# Patient Record
Sex: FEMALE | Race: WHITE | Employment: OTHER | URBAN - NONMETROPOLITAN AREA
[De-identification: names, ages, dates, MRNs, and addresses within clinical notes are randomized per-mention and may not be internally consistent; named-entity substitution may affect disease eponyms.]

---

## 2017-01-04 ENCOUNTER — TELEPHONE (OUTPATIENT)
Dept: FAMILY MEDICINE CLINIC | Age: 82
End: 2017-01-04

## 2017-01-04 ENCOUNTER — OFFICE VISIT (OUTPATIENT)
Dept: FAMILY MEDICINE CLINIC | Age: 82
End: 2017-01-04

## 2017-01-04 VITALS
HEIGHT: 65 IN | SYSTOLIC BLOOD PRESSURE: 128 MMHG | HEART RATE: 75 BPM | RESPIRATION RATE: 14 BRPM | TEMPERATURE: 97.8 F | BODY MASS INDEX: 25.36 KG/M2 | WEIGHT: 152.2 LBS | DIASTOLIC BLOOD PRESSURE: 64 MMHG

## 2017-01-04 DIAGNOSIS — R05.9 COUGH: Primary | ICD-10-CM

## 2017-01-04 PROCEDURE — 99213 OFFICE O/P EST LOW 20 MIN: CPT | Performed by: FAMILY MEDICINE

## 2017-01-04 RX ORDER — LEVOFLOXACIN 500 MG/1
500 TABLET, FILM COATED ORAL DAILY
Qty: 7 TABLET | Refills: 0 | Status: SHIPPED | OUTPATIENT
Start: 2017-01-04 | End: 2017-01-04 | Stop reason: SDUPTHER

## 2017-01-04 RX ORDER — BENZONATATE 200 MG/1
200 CAPSULE ORAL 3 TIMES DAILY PRN
Qty: 30 CAPSULE | Refills: 0 | Status: SHIPPED | OUTPATIENT
Start: 2017-01-04 | End: 2017-01-14

## 2017-01-04 RX ORDER — LEVOFLOXACIN 500 MG/1
500 TABLET, FILM COATED ORAL DAILY
Qty: 7 TABLET | Refills: 0 | Status: SHIPPED | OUTPATIENT
Start: 2017-01-04 | End: 2017-01-11

## 2017-01-05 ENCOUNTER — TELEPHONE (OUTPATIENT)
Dept: FAMILY MEDICINE CLINIC | Age: 82
End: 2017-01-05

## 2017-01-26 ENCOUNTER — OFFICE VISIT (OUTPATIENT)
Dept: FAMILY MEDICINE CLINIC | Age: 82
End: 2017-01-26

## 2017-01-26 VITALS
TEMPERATURE: 97.7 F | DIASTOLIC BLOOD PRESSURE: 64 MMHG | HEART RATE: 68 BPM | RESPIRATION RATE: 14 BRPM | SYSTOLIC BLOOD PRESSURE: 124 MMHG | HEIGHT: 61 IN | BODY MASS INDEX: 28.51 KG/M2 | WEIGHT: 151 LBS

## 2017-01-26 DIAGNOSIS — M79.601 RIGHT ARM PAIN: Primary | ICD-10-CM

## 2017-01-26 PROCEDURE — 99214 OFFICE O/P EST MOD 30 MIN: CPT | Performed by: FAMILY MEDICINE

## 2017-01-26 PROCEDURE — 1123F ACP DISCUSS/DSCN MKR DOCD: CPT | Performed by: FAMILY MEDICINE

## 2017-01-26 PROCEDURE — 1036F TOBACCO NON-USER: CPT | Performed by: FAMILY MEDICINE

## 2017-01-26 PROCEDURE — G8427 DOCREV CUR MEDS BY ELIG CLIN: HCPCS | Performed by: FAMILY MEDICINE

## 2017-01-26 PROCEDURE — G8420 CALC BMI NORM PARAMETERS: HCPCS | Performed by: FAMILY MEDICINE

## 2017-01-26 PROCEDURE — 1090F PRES/ABSN URINE INCON ASSESS: CPT | Performed by: FAMILY MEDICINE

## 2017-01-26 PROCEDURE — 4040F PNEUMOC VAC/ADMIN/RCVD: CPT | Performed by: FAMILY MEDICINE

## 2017-01-26 PROCEDURE — G8484 FLU IMMUNIZE NO ADMIN: HCPCS | Performed by: FAMILY MEDICINE

## 2017-01-26 RX ORDER — HYDROCODONE BITARTRATE AND ACETAMINOPHEN 5; 325 MG/1; MG/1
1 TABLET ORAL EVERY 6 HOURS PRN
Qty: 30 TABLET | Refills: 0 | Status: SHIPPED | OUTPATIENT
Start: 2017-01-26 | End: 2017-02-02

## 2017-01-26 RX ORDER — PREDNISONE 20 MG/1
20 TABLET ORAL DAILY
Qty: 5 TABLET | Refills: 0 | Status: SHIPPED | OUTPATIENT
Start: 2017-01-26 | End: 2017-01-31

## 2017-01-26 RX ORDER — MELOXICAM 7.5 MG/1
7.5 TABLET ORAL DAILY
Qty: 30 TABLET | Refills: 0 | Status: SHIPPED | OUTPATIENT
Start: 2017-01-26 | End: 2017-05-24

## 2017-07-25 ENCOUNTER — OFFICE VISIT (OUTPATIENT)
Dept: FAMILY MEDICINE CLINIC | Age: 82
End: 2017-07-25
Payer: MEDICARE

## 2017-07-25 VITALS
WEIGHT: 157.4 LBS | DIASTOLIC BLOOD PRESSURE: 67 MMHG | RESPIRATION RATE: 12 BRPM | TEMPERATURE: 98.1 F | HEART RATE: 68 BPM | SYSTOLIC BLOOD PRESSURE: 119 MMHG | HEIGHT: 65 IN | BODY MASS INDEX: 26.23 KG/M2

## 2017-07-25 DIAGNOSIS — R42 DIZZINESS: Primary | ICD-10-CM

## 2017-07-25 DIAGNOSIS — R53.83 OTHER FATIGUE: ICD-10-CM

## 2017-07-25 PROCEDURE — G8419 CALC BMI OUT NRM PARAM NOF/U: HCPCS | Performed by: FAMILY MEDICINE

## 2017-07-25 PROCEDURE — 4040F PNEUMOC VAC/ADMIN/RCVD: CPT | Performed by: FAMILY MEDICINE

## 2017-07-25 PROCEDURE — 1123F ACP DISCUSS/DSCN MKR DOCD: CPT | Performed by: FAMILY MEDICINE

## 2017-07-25 PROCEDURE — G8427 DOCREV CUR MEDS BY ELIG CLIN: HCPCS | Performed by: FAMILY MEDICINE

## 2017-07-25 PROCEDURE — 1036F TOBACCO NON-USER: CPT | Performed by: FAMILY MEDICINE

## 2017-07-25 PROCEDURE — 99213 OFFICE O/P EST LOW 20 MIN: CPT | Performed by: FAMILY MEDICINE

## 2017-07-25 PROCEDURE — 93000 ELECTROCARDIOGRAM COMPLETE: CPT | Performed by: FAMILY MEDICINE

## 2017-07-25 PROCEDURE — 1090F PRES/ABSN URINE INCON ASSESS: CPT | Performed by: FAMILY MEDICINE

## 2017-07-25 RX ORDER — MULTIVIT WITH MINERALS/LUTEIN
1000 TABLET ORAL DAILY
COMMUNITY
End: 2019-03-28 | Stop reason: ALTCHOICE

## 2017-07-25 RX ORDER — LANOLIN ALCOHOL/MO/W.PET/CERES
3 CREAM (GRAM) TOPICAL DAILY
COMMUNITY
End: 2017-08-15 | Stop reason: ALTCHOICE

## 2017-07-26 ENCOUNTER — HOSPITAL ENCOUNTER (OUTPATIENT)
Age: 82
Setting detail: SPECIMEN
Discharge: HOME | End: 2017-07-26
Payer: MEDICARE

## 2017-07-26 ENCOUNTER — TELEPHONE (OUTPATIENT)
Dept: FAMILY MEDICINE CLINIC | Age: 82
End: 2017-07-26

## 2017-07-26 DIAGNOSIS — E03.8 SUBCLINICAL HYPOTHYROIDISM: Primary | ICD-10-CM

## 2017-07-26 LAB
ANION GAP SERPL CALCULATED.3IONS-SCNC: 13 MEQ/L (ref 8–16)
ANISOCYTOSIS: ABNORMAL
BASOPHILS # BLD: 0.9 %
BASOPHILS ABSOLUTE: 0 THOU/MM3 (ref 0–0.1)
BUN BLDV-MCNC: 15 MG/DL (ref 7–22)
CALCIUM SERPL-MCNC: 8.9 MG/DL (ref 8.5–10.5)
CHLORIDE BLD-SCNC: 100 MEQ/L (ref 98–111)
CO2: 24 MEQ/L (ref 23–33)
CREAT SERPL-MCNC: 0.5 MG/DL (ref 0.4–1.2)
EOSINOPHIL # BLD: 3.9 %
EOSINOPHILS ABSOLUTE: 0.2 THOU/MM3 (ref 0–0.4)
GFR SERPL CREATININE-BSD FRML MDRD: > 90 ML/MIN/1.73M2
GLUCOSE BLD-MCNC: 96 MG/DL (ref 70–108)
HCT VFR BLD CALC: 37.1 % (ref 37–47)
HEMOGLOBIN: 12.6 GM/DL (ref 12–16)
LYMPHOCYTES # BLD: 27.1 %
LYMPHOCYTES ABSOLUTE: 1.3 THOU/MM3 (ref 1–4.8)
MCH RBC QN AUTO: 32.3 PG (ref 27–31)
MCHC RBC AUTO-ENTMCNC: 34 GM/DL (ref 33–37)
MCV RBC AUTO: 95.1 FL (ref 81–99)
MONOCYTES # BLD: 12.5 %
MONOCYTES ABSOLUTE: 0.6 THOU/MM3 (ref 0.4–1.3)
NUCLEATED RED BLOOD CELLS: 0 /100 WBC
PDW BLD-RTO: 14.6 % (ref 11.5–14.5)
PLATELET # BLD: 239 THOU/MM3 (ref 130–400)
PMV BLD AUTO: 8.7 MCM (ref 7.4–10.4)
POTASSIUM SERPL-SCNC: 4.4 MEQ/L (ref 3.5–5.2)
RBC # BLD: 3.9 MILL/MM3 (ref 4.2–5.4)
RBC # BLD: NORMAL 10*6/UL
SEG NEUTROPHILS: 55.6 %
SEGMENTED NEUTROPHILS ABSOLUTE COUNT: 2.7 THOU/MM3 (ref 1.8–7.7)
SODIUM BLD-SCNC: 137 MEQ/L (ref 135–145)
T4 FREE: 0.95 NG/DL (ref 0.93–1.76)
TSH SERPL DL<=0.05 MIU/L-ACNC: 4.59 UIU/ML (ref 0.4–4.2)
WBC # BLD: 4.8 THOU/MM3 (ref 4.8–10.8)

## 2017-07-26 PROCEDURE — 80048 BASIC METABOLIC PNL TOTAL CA: CPT

## 2017-07-26 PROCEDURE — 84439 ASSAY OF FREE THYROXINE: CPT

## 2017-07-26 PROCEDURE — 85025 COMPLETE CBC W/AUTO DIFF WBC: CPT

## 2017-07-26 PROCEDURE — 36415 COLL VENOUS BLD VENIPUNCTURE: CPT

## 2017-07-26 PROCEDURE — 84443 ASSAY THYROID STIM HORMONE: CPT

## 2017-07-28 ENCOUNTER — HOSPITAL ENCOUNTER (EMERGENCY)
Age: 82
Discharge: HOME | End: 2017-07-28
Attending: EMERGENCY MEDICINE
Payer: MEDICARE

## 2017-07-28 VITALS
SYSTOLIC BLOOD PRESSURE: 128 MMHG | WEIGHT: 150 LBS | HEIGHT: 65 IN | HEART RATE: 59 BPM | TEMPERATURE: 98.4 F | BODY MASS INDEX: 24.99 KG/M2 | RESPIRATION RATE: 16 BRPM | OXYGEN SATURATION: 97 % | DIASTOLIC BLOOD PRESSURE: 64 MMHG

## 2017-07-28 DIAGNOSIS — R42 DIZZINESS: Primary | ICD-10-CM

## 2017-07-28 DIAGNOSIS — Z87.19 HISTORY OF RECTAL BLEEDING: ICD-10-CM

## 2017-07-28 LAB
ANION GAP SERPL CALCULATED.3IONS-SCNC: 9 MEQ/L (ref 8–16)
BASOPHILS # BLD: 1.3 %
BASOPHILS ABSOLUTE: 0.1 THOU/MM3 (ref 0–0.1)
BUN BLDV-MCNC: 12 MG/DL (ref 7–22)
CALCIUM SERPL-MCNC: 8.8 MG/DL (ref 8.5–10.5)
CHLORIDE BLD-SCNC: 102 MEQ/L (ref 98–111)
CO2: 25 MEQ/L (ref 23–33)
CREAT SERPL-MCNC: 0.6 MG/DL (ref 0.4–1.2)
EKG ATRIAL RATE: 56 BPM
EKG P AXIS: 47 DEGREES
EKG P-R INTERVAL: 184 MS
EKG Q-T INTERVAL: 438 MS
EKG QRS DURATION: 78 MS
EKG QTC CALCULATION (BAZETT): 422 MS
EKG R AXIS: 8 DEGREES
EKG T AXIS: 63 DEGREES
EKG VENTRICULAR RATE: 56 BPM
EOSINOPHIL # BLD: 5.5 %
EOSINOPHILS ABSOLUTE: 0.2 THOU/MM3 (ref 0–0.4)
GFR SERPL CREATININE-BSD FRML MDRD: > 90 ML/MIN/1.73M2
GLUCOSE BLD-MCNC: 98 MG/DL (ref 70–108)
HCT VFR BLD CALC: 36.2 % (ref 37–47)
HEMOCCULT STL QL: NEGATIVE
HEMOGLOBIN: 12.4 GM/DL (ref 12–16)
LYMPHOCYTES # BLD: 20.9 %
LYMPHOCYTES ABSOLUTE: 0.9 THOU/MM3 (ref 1–4.8)
MCH RBC QN AUTO: 32.6 PG (ref 27–31)
MCHC RBC AUTO-ENTMCNC: 34.3 GM/DL (ref 33–37)
MCV RBC AUTO: 95 FL (ref 81–99)
MONOCYTES # BLD: 14.6 %
MONOCYTES ABSOLUTE: 0.6 THOU/MM3 (ref 0.4–1.3)
NUCLEATED RED BLOOD CELLS: 0 /100 WBC
OSMOLALITY CALCULATION: 271.7 MOSMOL/KG (ref 275–300)
PDW BLD-RTO: 14.5 % (ref 11.5–14.5)
PLATELET # BLD: 218 THOU/MM3 (ref 130–400)
PMV BLD AUTO: 8.1 MCM (ref 7.4–10.4)
POTASSIUM SERPL-SCNC: 4.1 MEQ/L (ref 3.5–5.2)
PRO-BNP: 72.2 PG/ML (ref 0–1800)
RBC # BLD: 3.81 MILL/MM3 (ref 4.2–5.4)
RBC # BLD: NORMAL 10*6/UL
SEG NEUTROPHILS: 57.7 %
SEGMENTED NEUTROPHILS ABSOLUTE COUNT: 2.4 THOU/MM3 (ref 1.8–7.7)
SODIUM BLD-SCNC: 136 MEQ/L (ref 135–145)
TROPONIN T: < 0.01 NG/ML
WBC # BLD: 4.1 THOU/MM3 (ref 4.8–10.8)

## 2017-07-28 PROCEDURE — 84484 ASSAY OF TROPONIN QUANT: CPT

## 2017-07-28 PROCEDURE — 96360 HYDRATION IV INFUSION INIT: CPT

## 2017-07-28 PROCEDURE — 93005 ELECTROCARDIOGRAM TRACING: CPT

## 2017-07-28 PROCEDURE — 83880 ASSAY OF NATRIURETIC PEPTIDE: CPT

## 2017-07-28 PROCEDURE — 96361 HYDRATE IV INFUSION ADD-ON: CPT

## 2017-07-28 PROCEDURE — 2580000003 HC RX 258: Performed by: EMERGENCY MEDICINE

## 2017-07-28 PROCEDURE — 99283 EMERGENCY DEPT VISIT LOW MDM: CPT

## 2017-07-28 PROCEDURE — 80048 BASIC METABOLIC PNL TOTAL CA: CPT

## 2017-07-28 PROCEDURE — 82272 OCCULT BLD FECES 1-3 TESTS: CPT

## 2017-07-28 PROCEDURE — 36415 COLL VENOUS BLD VENIPUNCTURE: CPT

## 2017-07-28 PROCEDURE — 85025 COMPLETE CBC W/AUTO DIFF WBC: CPT

## 2017-07-28 RX ORDER — SODIUM CHLORIDE 9 MG/ML
INJECTION, SOLUTION INTRAVENOUS CONTINUOUS
Status: DISCONTINUED | OUTPATIENT
Start: 2017-07-28 | End: 2017-07-28 | Stop reason: HOSPADM

## 2017-07-28 RX ADMIN — SODIUM CHLORIDE: 9 INJECTION, SOLUTION INTRAVENOUS at 09:42

## 2017-07-28 ASSESSMENT — ENCOUNTER SYMPTOMS
NAUSEA: 0
COUGH: 0
BACK PAIN: 0
ABDOMINAL PAIN: 0
VOMITING: 0
BLOOD IN STOOL: 1
WHEEZING: 0
SORE THROAT: 0
DIARRHEA: 0
SHORTNESS OF BREATH: 0

## 2017-07-31 ENCOUNTER — CARE COORDINATION (OUTPATIENT)
Dept: FAMILY MEDICINE CLINIC | Age: 82
End: 2017-07-31

## 2017-08-15 ENCOUNTER — OFFICE VISIT (OUTPATIENT)
Dept: FAMILY MEDICINE CLINIC | Age: 82
End: 2017-08-15
Payer: MEDICARE

## 2017-08-15 VITALS
SYSTOLIC BLOOD PRESSURE: 108 MMHG | WEIGHT: 154.8 LBS | HEIGHT: 61 IN | BODY MASS INDEX: 29.23 KG/M2 | TEMPERATURE: 97.7 F | RESPIRATION RATE: 12 BRPM | HEART RATE: 74 BPM | DIASTOLIC BLOOD PRESSURE: 52 MMHG

## 2017-08-15 DIAGNOSIS — I50.32 CHRONIC DIASTOLIC CONGESTIVE HEART FAILURE (HCC): ICD-10-CM

## 2017-08-15 DIAGNOSIS — M79.602 LEFT ARM PAIN: Primary | ICD-10-CM

## 2017-08-15 PROCEDURE — 1123F ACP DISCUSS/DSCN MKR DOCD: CPT | Performed by: FAMILY MEDICINE

## 2017-08-15 PROCEDURE — 4040F PNEUMOC VAC/ADMIN/RCVD: CPT | Performed by: FAMILY MEDICINE

## 2017-08-15 PROCEDURE — G8427 DOCREV CUR MEDS BY ELIG CLIN: HCPCS | Performed by: FAMILY MEDICINE

## 2017-08-15 PROCEDURE — 1036F TOBACCO NON-USER: CPT | Performed by: FAMILY MEDICINE

## 2017-08-15 PROCEDURE — 1090F PRES/ABSN URINE INCON ASSESS: CPT | Performed by: FAMILY MEDICINE

## 2017-08-15 PROCEDURE — G8419 CALC BMI OUT NRM PARAM NOF/U: HCPCS | Performed by: FAMILY MEDICINE

## 2017-08-15 PROCEDURE — 99214 OFFICE O/P EST MOD 30 MIN: CPT | Performed by: FAMILY MEDICINE

## 2017-08-15 RX ORDER — CAPSAICIN 0.025 %
CREAM (GRAM) TOPICAL
Qty: 45 G | Refills: 1 | Status: SHIPPED | OUTPATIENT
Start: 2017-08-15 | End: 2017-09-14

## 2017-08-15 RX ORDER — ASPIRIN 325 MG
325 TABLET ORAL EVERY 4 HOURS PRN
COMMUNITY
End: 2019-03-28 | Stop reason: ALTCHOICE

## 2017-08-17 ENCOUNTER — CARE COORDINATION (OUTPATIENT)
Dept: CARE COORDINATION | Age: 82
End: 2017-08-17

## 2017-09-08 ENCOUNTER — CARE COORDINATION (OUTPATIENT)
Dept: CARE COORDINATION | Age: 82
End: 2017-09-08

## 2017-10-03 ENCOUNTER — OFFICE VISIT (OUTPATIENT)
Dept: FAMILY MEDICINE CLINIC | Age: 82
End: 2017-10-03
Payer: MEDICARE

## 2017-10-03 VITALS
RESPIRATION RATE: 12 BRPM | WEIGHT: 155.8 LBS | TEMPERATURE: 98.1 F | HEART RATE: 60 BPM | SYSTOLIC BLOOD PRESSURE: 119 MMHG | BODY MASS INDEX: 29.42 KG/M2 | HEIGHT: 61 IN | DIASTOLIC BLOOD PRESSURE: 60 MMHG

## 2017-10-03 DIAGNOSIS — E03.8 SUBCLINICAL HYPOTHYROIDISM: Primary | ICD-10-CM

## 2017-10-03 PROCEDURE — G8484 FLU IMMUNIZE NO ADMIN: HCPCS | Performed by: FAMILY MEDICINE

## 2017-10-03 PROCEDURE — 4040F PNEUMOC VAC/ADMIN/RCVD: CPT | Performed by: FAMILY MEDICINE

## 2017-10-03 PROCEDURE — 1123F ACP DISCUSS/DSCN MKR DOCD: CPT | Performed by: FAMILY MEDICINE

## 2017-10-03 PROCEDURE — 1090F PRES/ABSN URINE INCON ASSESS: CPT | Performed by: FAMILY MEDICINE

## 2017-10-03 PROCEDURE — 1036F TOBACCO NON-USER: CPT | Performed by: FAMILY MEDICINE

## 2017-10-03 PROCEDURE — 99213 OFFICE O/P EST LOW 20 MIN: CPT | Performed by: FAMILY MEDICINE

## 2017-10-03 PROCEDURE — G8417 CALC BMI ABV UP PARAM F/U: HCPCS | Performed by: FAMILY MEDICINE

## 2017-10-03 PROCEDURE — G8427 DOCREV CUR MEDS BY ELIG CLIN: HCPCS | Performed by: FAMILY MEDICINE

## 2017-10-03 NOTE — PROGRESS NOTES
Radha Hartman is a 80 y.o. female that presents for Thyroid Problem (pt is wanting more information about hypothyroidism) and Results (pt states she was at [facility], had testing done and doesn't remember getting any results.)        HPI:    Abnormal Thyroid Labs    HPI:  Patient with recent Thyroid testing with mildly elevated TSH and borderline low Free T4. Results below. Feeling well. Currently treated for Hypothyroidism? No  Fatigue? No  Recent change in weight? No  Cold/Heat intolerance? No  Diarrhea/Constipation? No  Diaphoresis? No  Anxiety? No  Palpitations? No   Hair Loss?   No    Component      Latest Ref Rng & Units 7/26/2017 7/26/2017          11:00 AM 11:00 AM   TSH      0.400 - 4.20 uIU/mL  4.590 (H)   T4 Free      0.93 - 1.76 ng/dL 0.95          Health Maintenance   Topic Date Due    DTaP/Tdap/Td vaccine (1 - Tdap) 05/25/1949    Flu vaccine (1) 09/01/2017    Zostavax vaccine  Addressed    Pneumococcal low/med risk  Completed       Past Medical History:   Diagnosis Date    Arthritis     Cancer ([facility] [address]     skin    Colitis     Colon polyps     Fatty liver     resolved per patient    Hearing loss of both ears     Hyperlipidemia     Varicosity        Past Surgical History:   Procedure Laterality Date    BLADDER SUSPENSION  1968    FOOT SURGERY Right 1970s    HYSTERECTOMY  1968    JOINT REPLACEMENT Left     left knee    JOINT REPLACEMENT Right     hip    MOHS SURGERY  1/22/14    NOSE    SMALL INTESTINE SURGERY  2008    small blockage-adhesion       Social History   Substance Use Topics    Smoking status: Never Smoker    Smokeless tobacco: Never Used    Alcohol use Yes      Comment: rare       Family History   Problem Relation Age of Onset    Arthritis Mother     Arthritis Father     Cancer Father      lung    Stroke Father     Diabetes Sister          I have reviewed the patient's past medical history, past surgical history, allergies, medications, social and family history and I have made updates where appropriate. ROS        PHYSICAL EXAM:  /60  Pulse 60  Temp 98.1 °F (36.7 °C) (Oral)   Resp 12  Ht 5' 1.02\" (1.55 m)  Wt 155 lb 12.8 oz (70.7 kg)  BMI 29.42 kg/m2    General Appearance: well developed and well- nourished, in no acute distress  Head: normocephalic and atraumatic  ENT: external ear and ear canal normal bilaterally, nose without deformity, nasal mucosa and turbinates normal without polyps, oropharynx normal, dentition is normal for age, no lip or gum lesions noted  Neck: supple and non-tender without mass, no thyromegaly or thyroid nodules, no cervical lymphadenopathy  Pulmonary/Chest: clear to auscultation bilaterally- no wheezes, rales or rhonchi, normal air movement, no respiratory distress or retractions  Cardiovascular: normal rate, regular rhythm, normal S1 and S2, no murmurs, rubs, clicks, or gallops, distal pulses intact  Extremities: no cyanosis, clubbing or edema of the lower extremities  Psych:  Normal affect without evidence of depression or anxiety, insight and judgement are appropriate, memory appears intact  Skin: warm and dry, no rash or erythema      ASSESSMENT & PLAN  Alta Cruz was seen today for thyroid problem and results. Diagnoses and all orders for this visit:    Subclinical hypothyroidism  -     TSH without Reflex; Future  -     T4, Free; Future    -Patient currently asymptomatic  -Will continue to monitor for now and recheck thyroid labs prior to next visit    Return if symptoms worsen or fail to improve. Alta Cruz received counseling on the following healthy behaviors: medication adherence  Reviewed prior labs and health maintenance. Continue current medications, diet and exercise. Discussed use, benefit, and side effects of prescribed medications. Barriers to medication compliance addressed. Patient given educational materials - see patient instructions. All patient questions answered. Patient voiced understanding.

## 2017-10-09 ENCOUNTER — HOSPITAL ENCOUNTER (EMERGENCY)
Age: 82
Discharge: HOME | End: 2017-10-09
Payer: MEDICARE

## 2017-10-09 VITALS
HEART RATE: 82 BPM | TEMPERATURE: 98.9 F | SYSTOLIC BLOOD PRESSURE: 120 MMHG | OXYGEN SATURATION: 97 % | DIASTOLIC BLOOD PRESSURE: 62 MMHG | RESPIRATION RATE: 16 BRPM

## 2017-10-09 DIAGNOSIS — L03.116 CELLULITIS OF LEFT THIGH: Primary | ICD-10-CM

## 2017-10-09 PROCEDURE — 99213 OFFICE O/P EST LOW 20 MIN: CPT

## 2017-10-09 PROCEDURE — 99213 OFFICE O/P EST LOW 20 MIN: CPT | Performed by: NURSE PRACTITIONER

## 2017-10-09 RX ORDER — CEPHALEXIN 500 MG/1
500 CAPSULE ORAL 4 TIMES DAILY
Qty: 40 CAPSULE | Refills: 0 | Status: SHIPPED | OUTPATIENT
Start: 2017-10-09 | End: 2017-10-19

## 2017-10-09 ASSESSMENT — PAIN DESCRIPTION - LOCATION: LOCATION: LEG

## 2017-10-09 ASSESSMENT — PAIN SCALES - GENERAL: PAINLEVEL_OUTOF10: 9

## 2017-10-09 ASSESSMENT — PAIN DESCRIPTION - DESCRIPTORS: DESCRIPTORS: SORE

## 2017-10-09 ASSESSMENT — PAIN DESCRIPTION - FREQUENCY: FREQUENCY: CONTINUOUS

## 2017-10-09 ASSESSMENT — ENCOUNTER SYMPTOMS
COLOR CHANGE: 1
SHORTNESS OF BREATH: 0

## 2017-10-09 ASSESSMENT — PAIN DESCRIPTION - PAIN TYPE: TYPE: ACUTE PAIN

## 2017-10-09 ASSESSMENT — PAIN DESCRIPTION - ORIENTATION: ORIENTATION: LEFT

## 2017-10-09 NOTE — ED PROVIDER NOTES
[facility]  Urgent Care Encounter      CHIEF COMPLAINT       Chief Complaint   Patient presents with    Leg Pain     left knee pain onset Saturday        Nurses Notes reviewed and I agree except as noted in the HPI. HISTORY OF PRESENT ILLNESS   Rehana Noguera is a 80 y.o. female who presents with c/o left thigh pain. Onset 3 days ago, unchanged. Pain is aching, continuous. Rates 9/10. Denies injury. Associated swelling and redness. Denies fever, chest pain, or SOB. No recent travel. No recent surgery. Minimal improvement with ice. REVIEW OF SYSTEMS     Review of Systems   Constitutional: Negative for chills, diaphoresis, fatigue and fever. Respiratory: Negative for shortness of breath. Cardiovascular: Positive for leg swelling (bilateral, chronic without changes). Negative for chest pain and palpitations. Musculoskeletal: Negative for arthralgias and joint swelling. Skin: Positive for color change. Negative for pallor. Skin mass, left thigh     Neurological: Negative for dizziness and headaches. Hematological: Negative for adenopathy. Psychiatric/Behavioral: Negative for confusion. PAST MEDICAL HISTORY         Diagnosis Date    Arthritis     Cancer ([facility] [address]     skin    Colitis     Colon polyps     Fatty liver     resolved per patient    Hearing loss of both ears     Hyperlipidemia     Varicosity        SURGICAL HISTORY     Patient  has a past surgical history that includes Foot surgery (Right, 1970s); Hysterectomy (1968); bladder suspension (1968); Small intestine surgery (2008); Mohs surgery (1/22/14); joint replacement (Left); and joint replacement (Right).     CURRENT MEDICATIONS       Previous Medications    ASPIRIN 325 MG TABLET    Take 325 mg by mouth every 4 hours as needed for Pain    IBUPROFEN (ADVIL;MOTRIN) 200 MG TABLET    Take 400 mg by mouth every 8 hours as needed for Pain     NONFORMULARY    [employer] Prim Advantage    OMEGA-3 FATTY ACIDS (FISH OIL) 1000 MG CAPS    Take 1,000 mg by mouth daily     PANAX GINSENG 100 MG CAPS    Take by mouth    PROBIOTIC PRODUCT (PROBIOTIC ADVANCED PO)    Take by mouth    UNABLE TO FIND    Alleviate, joint health    VITAMIN E 1000 UNITS CAPSULE    Take 1,000 Units by mouth daily       ALLERGIES     Patient is is allergic to sulfa antibiotics. FAMILY HISTORY     Patient's family history includes Arthritis in her father and mother; Cancer in her father; Diabetes in her sister; Stroke in her father. SOCIAL HISTORY     Patient  reports that she has never smoked. She has never used smokeless tobacco. She reports that she drinks alcohol. She reports that she does not use drugs. PHYSICAL EXAM     ED TRIAGE VITALS  BP: 120/62, Temp: 98.9 °F (37.2 °C), Pulse: 82, Resp: 16, SpO2: 97 %  Physical Exam   Constitutional: Vital signs are normal. She appears well-developed and well-nourished. Non-toxic appearance. She does not have a sickly appearance. She does not appear ill. No distress. HENT:   Head: Normocephalic and atraumatic. Cardiovascular:   Pulses:       Dorsalis pedis pulses are 2+ on the right side, and 2+ on the left side. Posterior tibial pulses are 2+ on the right side, and 2+ on the left side. Abdominal: There is no CVA tenderness. Musculoskeletal:        Right upper leg: Normal.        Left upper leg: She exhibits tenderness and swelling. She exhibits no bony tenderness, no edema and no deformity. Right lower leg: She exhibits no tenderness. Left lower leg: She exhibits no tenderness. Negative homans bilateral   Skin: Skin is warm, dry and intact. No abrasion, no bruising, no ecchymosis, no petechiae and no rash noted. Rash is not pustular and not vesicular. She is not diaphoretic. There is erythema. No pallor. Bilateral varicose veins   Nursing note and vitals reviewed. DIAGNOSTIC RESULTS   Labs:No results found for this visit on 10/09/17.     IMAGING:  No orders to

## 2017-10-10 ENCOUNTER — CARE COORDINATION (OUTPATIENT)
Dept: FAMILY MEDICINE CLINIC | Age: 82
End: 2017-10-10

## 2017-10-11 ENCOUNTER — CARE COORDINATION (OUTPATIENT)
Dept: CARE COORDINATION | Age: 82
End: 2017-10-11

## 2017-11-16 ENCOUNTER — CARE COORDINATION (OUTPATIENT)
Dept: CARE COORDINATION | Age: 82
End: 2017-11-16

## 2017-11-16 NOTE — CARE COORDINATION
[phone] [employer] phone number is no longer a working number. I called and left a message with Davina Psoey to see if a new number can be given to reach [facility] for follow up.

## 2017-12-19 ENCOUNTER — CARE COORDINATION (OUTPATIENT)
Dept: CARE COORDINATION | Age: 82
End: 2017-12-19

## 2018-01-17 ENCOUNTER — CARE COORDINATION (OUTPATIENT)
Dept: CARE COORDINATION | Age: 83
End: 2018-01-17

## 2018-01-24 ENCOUNTER — TELEPHONE (OUTPATIENT)
Dept: FAMILY MEDICINE CLINIC | Age: 83
End: 2018-01-24

## 2018-01-24 ENCOUNTER — OFFICE VISIT (OUTPATIENT)
Dept: FAMILY MEDICINE CLINIC | Age: 83
End: 2018-01-24
Payer: MEDICARE

## 2018-01-24 VITALS
WEIGHT: 154 LBS | BODY MASS INDEX: 29.07 KG/M2 | HEIGHT: 61 IN | TEMPERATURE: 98 F | RESPIRATION RATE: 14 BRPM | HEART RATE: 67 BPM | SYSTOLIC BLOOD PRESSURE: 100 MMHG | DIASTOLIC BLOOD PRESSURE: 40 MMHG

## 2018-01-24 DIAGNOSIS — I50.32 CHRONIC DIASTOLIC HEART FAILURE (HCC): ICD-10-CM

## 2018-01-24 DIAGNOSIS — E03.8 SUBCLINICAL HYPOTHYROIDISM: Primary | ICD-10-CM

## 2018-01-24 PROCEDURE — 1090F PRES/ABSN URINE INCON ASSESS: CPT | Performed by: FAMILY MEDICINE

## 2018-01-24 PROCEDURE — 4040F PNEUMOC VAC/ADMIN/RCVD: CPT | Performed by: FAMILY MEDICINE

## 2018-01-24 PROCEDURE — G8417 CALC BMI ABV UP PARAM F/U: HCPCS | Performed by: FAMILY MEDICINE

## 2018-01-24 PROCEDURE — 1036F TOBACCO NON-USER: CPT | Performed by: FAMILY MEDICINE

## 2018-01-24 PROCEDURE — 99213 OFFICE O/P EST LOW 20 MIN: CPT | Performed by: FAMILY MEDICINE

## 2018-01-24 PROCEDURE — G8482 FLU IMMUNIZE ORDER/ADMIN: HCPCS | Performed by: FAMILY MEDICINE

## 2018-01-24 PROCEDURE — 1123F ACP DISCUSS/DSCN MKR DOCD: CPT | Performed by: FAMILY MEDICINE

## 2018-01-24 PROCEDURE — G8427 DOCREV CUR MEDS BY ELIG CLIN: HCPCS | Performed by: FAMILY MEDICINE

## 2018-01-24 ASSESSMENT — PATIENT HEALTH QUESTIONNAIRE - PHQ9
1. LITTLE INTEREST OR PLEASURE IN DOING THINGS: 0
SUM OF ALL RESPONSES TO PHQ9 QUESTIONS 1 & 2: 0
SUM OF ALL RESPONSES TO PHQ QUESTIONS 1-9: 0
2. FEELING DOWN, DEPRESSED OR HOPELESS: 0

## 2018-01-24 NOTE — TELEPHONE ENCOUNTER
While checking out the patient from her visit, she mentioned that she has right leg pain behind the knee. It hurts when walking but not while sitting. Patient said she hasn't noticed that it is warm to the touch. It is not pink or red. Sometimes it hurts so much that she will have to sit back down. She states that she does typically use a walker but did not bring it with her today. Please advise clinical staff.

## 2018-02-05 ENCOUNTER — TELEPHONE (OUTPATIENT)
Dept: FAMILY MEDICINE CLINIC | Age: 83
End: 2018-02-05

## 2018-04-09 ENCOUNTER — TELEPHONE (OUTPATIENT)
Dept: FAMILY MEDICINE CLINIC | Age: 83
End: 2018-04-09

## 2018-04-17 ENCOUNTER — HOSPITAL ENCOUNTER (EMERGENCY)
Age: 83
Discharge: HOME | End: 2018-04-17
Payer: MEDICARE

## 2018-04-17 VITALS
BODY MASS INDEX: 23.32 KG/M2 | TEMPERATURE: 97.9 F | OXYGEN SATURATION: 98 % | HEART RATE: 59 BPM | WEIGHT: 140 LBS | DIASTOLIC BLOOD PRESSURE: 64 MMHG | RESPIRATION RATE: 16 BRPM | HEIGHT: 65 IN | SYSTOLIC BLOOD PRESSURE: 141 MMHG

## 2018-04-17 DIAGNOSIS — L03.116 CELLULITIS OF LEFT THIGH: Primary | ICD-10-CM

## 2018-04-17 PROCEDURE — 99212 OFFICE O/P EST SF 10 MIN: CPT

## 2018-04-17 PROCEDURE — 99213 OFFICE O/P EST LOW 20 MIN: CPT | Performed by: NURSE PRACTITIONER

## 2018-04-17 RX ORDER — CEPHALEXIN 500 MG/1
500 CAPSULE ORAL 3 TIMES DAILY
Qty: 30 CAPSULE | Refills: 0 | Status: SHIPPED | OUTPATIENT
Start: 2018-04-17 | End: 2018-04-27

## 2018-04-17 ASSESSMENT — PAIN SCALES - GENERAL: PAINLEVEL_OUTOF10: 8

## 2018-04-17 ASSESSMENT — ENCOUNTER SYMPTOMS
DIARRHEA: 0
COLOR CHANGE: 1
NAUSEA: 0
VOMITING: 0
SHORTNESS OF BREATH: 0

## 2018-04-17 ASSESSMENT — PAIN DESCRIPTION - ORIENTATION: ORIENTATION: LEFT

## 2018-04-17 ASSESSMENT — PAIN DESCRIPTION - LOCATION: LOCATION: LEG

## 2018-04-17 ASSESSMENT — PAIN DESCRIPTION - DESCRIPTORS: DESCRIPTORS: ACHING

## 2018-04-19 ENCOUNTER — OFFICE VISIT (OUTPATIENT)
Dept: FAMILY MEDICINE CLINIC | Age: 83
End: 2018-04-19
Payer: MEDICARE

## 2018-04-19 ENCOUNTER — TELEPHONE (OUTPATIENT)
Dept: FAMILY MEDICINE CLINIC | Age: 83
End: 2018-04-19

## 2018-04-19 VITALS
BODY MASS INDEX: 27.75 KG/M2 | WEIGHT: 147 LBS | DIASTOLIC BLOOD PRESSURE: 60 MMHG | SYSTOLIC BLOOD PRESSURE: 110 MMHG | RESPIRATION RATE: 12 BRPM | HEART RATE: 53 BPM | HEIGHT: 61 IN | TEMPERATURE: 98.2 F

## 2018-04-19 DIAGNOSIS — I80.9 PHLEBITIS: Primary | ICD-10-CM

## 2018-04-19 PROCEDURE — 1090F PRES/ABSN URINE INCON ASSESS: CPT | Performed by: FAMILY MEDICINE

## 2018-04-19 PROCEDURE — 1123F ACP DISCUSS/DSCN MKR DOCD: CPT | Performed by: FAMILY MEDICINE

## 2018-04-19 PROCEDURE — 1036F TOBACCO NON-USER: CPT | Performed by: FAMILY MEDICINE

## 2018-04-19 PROCEDURE — G8427 DOCREV CUR MEDS BY ELIG CLIN: HCPCS | Performed by: FAMILY MEDICINE

## 2018-04-19 PROCEDURE — 99213 OFFICE O/P EST LOW 20 MIN: CPT | Performed by: FAMILY MEDICINE

## 2018-04-19 PROCEDURE — G8417 CALC BMI ABV UP PARAM F/U: HCPCS | Performed by: FAMILY MEDICINE

## 2018-04-19 PROCEDURE — 4040F PNEUMOC VAC/ADMIN/RCVD: CPT | Performed by: FAMILY MEDICINE

## 2018-05-18 ENCOUNTER — TELEPHONE (OUTPATIENT)
Dept: FAMILY MEDICINE CLINIC | Age: 83
End: 2018-05-18

## 2018-05-21 ENCOUNTER — TELEPHONE (OUTPATIENT)
Dept: FAMILY MEDICINE CLINIC | Age: 83
End: 2018-05-21

## 2018-05-21 DIAGNOSIS — M16.0 OSTEOARTHRITIS OF BOTH HIPS, UNSPECIFIED OSTEOARTHRITIS TYPE: Primary | ICD-10-CM

## 2018-09-13 NOTE — PROGRESS NOTES
NPO after midnight  [employer] and drivers license  Wear comfortable clean clothing  Do not bring jewelry  Shower night before and morning of surgery with a liquid antibacterial soap  Bring list of medications with dosage and how often taken  Follow all instructions given by your physician   needed at discharge  Call [phone] for any questions

## 2018-09-18 ENCOUNTER — HOSPITAL ENCOUNTER (OUTPATIENT)
Age: 83
Discharge: HOME | End: 2018-09-18
Payer: MEDICARE

## 2018-09-18 LAB
ANION GAP SERPL CALCULATED.3IONS-SCNC: 11 MEQ/L (ref 8–16)
BUN BLDV-MCNC: 11 MG/DL (ref 7–22)
CALCIUM SERPL-MCNC: 8.8 MG/DL (ref 8.5–10.5)
CHLORIDE BLD-SCNC: 103 MEQ/L (ref 98–111)
CO2: 24 MEQ/L (ref 23–33)
CREAT SERPL-MCNC: 0.6 MG/DL (ref 0.4–1.2)
EKG ATRIAL RATE: 63 BPM
EKG P AXIS: 43 DEGREES
EKG P-R INTERVAL: 178 MS
EKG Q-T INTERVAL: 436 MS
EKG QRS DURATION: 78 MS
EKG QTC CALCULATION (BAZETT): 446 MS
EKG R AXIS: 10 DEGREES
EKG T AXIS: 74 DEGREES
EKG VENTRICULAR RATE: 63 BPM
ERYTHROCYTE [DISTWIDTH] IN BLOOD BY AUTOMATED COUNT: 14.1 % (ref 11.5–14.5)
ERYTHROCYTE [DISTWIDTH] IN BLOOD BY AUTOMATED COUNT: 49.1 FL (ref 35–45)
GFR SERPL CREATININE-BSD FRML MDRD: > 90 ML/MIN/1.73M2
GLUCOSE BLD-MCNC: 100 MG/DL (ref 70–108)
HCT VFR BLD CALC: 36.9 % (ref 37–47)
HEMOGLOBIN: 12.4 GM/DL (ref 12–16)
MCH RBC QN AUTO: 32 PG (ref 26–33)
MCHC RBC AUTO-ENTMCNC: 33.6 GM/DL (ref 32.2–35.5)
MCV RBC AUTO: 95.3 FL (ref 81–99)
PLATELET # BLD: 240 THOU/MM3 (ref 130–400)
PMV BLD AUTO: 9.7 FL (ref 9.4–12.4)
POTASSIUM SERPL-SCNC: 4.4 MEQ/L (ref 3.5–5.2)
RBC # BLD: 3.87 MILL/MM3 (ref 4.2–5.4)
SODIUM BLD-SCNC: 138 MEQ/L (ref 135–145)
WBC # BLD: 4.5 THOU/MM3 (ref 4.8–10.8)

## 2018-09-18 PROCEDURE — 93005 ELECTROCARDIOGRAM TRACING: CPT | Performed by: PHYSICIAN ASSISTANT

## 2018-09-18 PROCEDURE — 85027 COMPLETE CBC AUTOMATED: CPT

## 2018-09-18 PROCEDURE — 80048 BASIC METABOLIC PNL TOTAL CA: CPT

## 2018-09-18 PROCEDURE — 36415 COLL VENOUS BLD VENIPUNCTURE: CPT

## 2018-09-19 ENCOUNTER — ANESTHESIA (OUTPATIENT)
Dept: OPERATING ROOM | Age: 83
End: 2018-09-19
Payer: MEDICARE

## 2018-09-19 ENCOUNTER — HOSPITAL ENCOUNTER (OUTPATIENT)
Age: 83
Setting detail: OUTPATIENT SURGERY
Discharge: HOME | End: 2018-09-19
Attending: SPECIALIST | Admitting: SPECIALIST
Payer: MEDICARE

## 2018-09-19 ENCOUNTER — ANESTHESIA EVENT (OUTPATIENT)
Dept: OPERATING ROOM | Age: 83
End: 2018-09-19
Payer: MEDICARE

## 2018-09-19 VITALS
HEART RATE: 62 BPM | DIASTOLIC BLOOD PRESSURE: 64 MMHG | OXYGEN SATURATION: 96 % | RESPIRATION RATE: 16 BRPM | BODY MASS INDEX: 22.76 KG/M2 | TEMPERATURE: 97.8 F | HEIGHT: 67 IN | SYSTOLIC BLOOD PRESSURE: 140 MMHG | WEIGHT: 145 LBS

## 2018-09-19 VITALS
OXYGEN SATURATION: 93 % | SYSTOLIC BLOOD PRESSURE: 112 MMHG | DIASTOLIC BLOOD PRESSURE: 54 MMHG | RESPIRATION RATE: 13 BRPM

## 2018-09-19 PROCEDURE — 6360000002 HC RX W HCPCS: Performed by: NURSE ANESTHETIST, CERTIFIED REGISTERED

## 2018-09-19 PROCEDURE — 2709999900 HC NON-CHARGEABLE SUPPLY: Performed by: SPECIALIST

## 2018-09-19 PROCEDURE — 3700000001 HC ADD 15 MINUTES (ANESTHESIA): Performed by: SPECIALIST

## 2018-09-19 PROCEDURE — 2580000003 HC RX 258: Performed by: SPECIALIST

## 2018-09-19 PROCEDURE — 6360000002 HC RX W HCPCS: Performed by: SPECIALIST

## 2018-09-19 PROCEDURE — 7100000011 HC PHASE II RECOVERY - ADDTL 15 MIN: Performed by: SPECIALIST

## 2018-09-19 PROCEDURE — 2500000003 HC RX 250 WO HCPCS: Performed by: SPECIALIST

## 2018-09-19 PROCEDURE — 3600000012 HC SURGERY LEVEL 2 ADDTL 15MIN: Performed by: SPECIALIST

## 2018-09-19 PROCEDURE — 93010 ELECTROCARDIOGRAM REPORT: CPT | Performed by: INTERNAL MEDICINE

## 2018-09-19 PROCEDURE — 3700000000 HC ANESTHESIA ATTENDED CARE: Performed by: SPECIALIST

## 2018-09-19 PROCEDURE — 3600000002 HC SURGERY LEVEL 2 BASE: Performed by: SPECIALIST

## 2018-09-19 PROCEDURE — 7100000010 HC PHASE II RECOVERY - FIRST 15 MIN: Performed by: SPECIALIST

## 2018-09-19 RX ORDER — LIDOCAINE HYDROCHLORIDE AND EPINEPHRINE 10; 10 MG/ML; UG/ML
INJECTION, SOLUTION INFILTRATION; PERINEURAL PRN
Status: DISCONTINUED | OUTPATIENT
Start: 2018-09-19 | End: 2018-09-19 | Stop reason: HOSPADM

## 2018-09-19 RX ORDER — PROPOFOL 10 MG/ML
INJECTION, EMULSION INTRAVENOUS PRN
Status: DISCONTINUED | OUTPATIENT
Start: 2018-09-19 | End: 2018-09-19 | Stop reason: SDUPTHER

## 2018-09-19 RX ORDER — SODIUM CHLORIDE 9 MG/ML
INJECTION, SOLUTION INTRAVENOUS CONTINUOUS
Status: DISCONTINUED | OUTPATIENT
Start: 2018-09-19 | End: 2018-09-19 | Stop reason: HOSPADM

## 2018-09-19 RX ADMIN — SODIUM CHLORIDE: 9 INJECTION, SOLUTION INTRAVENOUS at 14:27

## 2018-09-19 RX ADMIN — CEFAZOLIN 1 G: 1 INJECTION, POWDER, FOR SOLUTION INTRAMUSCULAR; INTRAVENOUS; PARENTERAL at 14:29

## 2018-09-19 RX ADMIN — PROPOFOL 40 MG: 10 INJECTION, EMULSION INTRAVENOUS at 14:28

## 2018-09-19 RX ADMIN — PROPOFOL 20 MG: 10 INJECTION, EMULSION INTRAVENOUS at 14:31

## 2018-09-19 ASSESSMENT — PULMONARY FUNCTION TESTS
PIF_VALUE: 0

## 2018-09-19 ASSESSMENT — PAIN SCALES - GENERAL: PAINLEVEL_OUTOF10: 0

## 2018-09-19 ASSESSMENT — ENCOUNTER SYMPTOMS: SHORTNESS OF BREATH: 1

## 2018-09-19 NOTE — ANESTHESIA PRE PROCEDURE
Past Medical History:        Diagnosis Date    Arthritis     Cancer ([facility] [address]     skin    Colitis     Colon polyps     Fatty liver     resolved per patient    Hearing loss of both ears     Hyperlipidemia     Varicosity        Past Surgical History:        Procedure Laterality Date    BLADDER SUSPENSION  1968    FOOT SURGERY Right 1970s    HYSTERECTOMY  1968    JOINT REPLACEMENT Left     left knee    JOINT REPLACEMENT Right     hip    MOHS SURGERY  1/22/14    NOSE    SMALL INTESTINE SURGERY  2008    small blockage-adhesion       Social History:    Social History   Substance Use Topics    Smoking status: Never Smoker    Smokeless tobacco: Never Used    Alcohol use Yes      Comment: rare                                Counseling given: Not Answered      Vital Signs (Current):   Vitals:    09/13/18 0916 09/19/18 1319   BP:  (!) 151/69   Pulse:  64   Resp:  16   Temp:  98 °F (36.7 °C)   TempSrc:  Temporal   SpO2:  96%   Weight: 140 lb (63.5 kg) 145 lb (65.8 kg)   Height: 5' 7\" (1.702 m) 5' 7\" (1.702 m)                                              BP Readings from Last 3 Encounters:   09/19/18 (!) 151/69   09/19/18 (!) 112/54   04/19/18 110/60       NPO Status: Time of last liquid consumption: 0900 (8 ounces of black coffee)                        Time of last solid consumption: 1800                        Date of last liquid consumption: 09/19/18                        Date of last solid food consumption: 09/18/18    BMI:   Wt Readings from Last 3 Encounters:   09/19/18 145 lb (65.8 kg)   04/19/18 147 lb (66.7 kg)   04/17/18 140 lb (63.5 kg)     Body mass index is 22.71 kg/m².     CBC:   Lab Results   Component Value Date    WBC 4.5 09/18/2018    RBC 3.87 09/18/2018    RBC 4.14 03/05/2015    HGB 12.4 09/18/2018    HCT 36.9 09/18/2018    MCV 95.3 09/18/2018    RDW 14.0 08/12/2017     09/18/2018       CMP:   Lab Results   Component Value Date     09/18/2018    K 4.4 09/18/2018     09/18/2018    CO2 24 09/18/2018    BUN 11 09/18/2018    CREATININE 0.6 09/18/2018    LABGLOM >90 09/18/2018    GLUCOSE 100 09/18/2018    GLUCOSE 106 08/12/2017    PROT 6.7 03/05/2015    CALCIUM 8.8 09/18/2018    BILITOT 0.3 03/05/2015    ALKPHOS 106 03/05/2015    ALKPHOS 107 08/22/2014    AST 20 03/05/2015    ALT 19 03/05/2015       POC Tests: No results for input(s): POCGLU, POCNA, POCK, POCCL, POCBUN, POCHEMO, POCHCT in the last 72 hours. Coags:   Lab Results   Component Value Date    INR 0.91 01/21/2017       HCG (If Applicable): No results found for: PREGTESTUR, PREGSERUM, HCG, HCGQUANT     ABGs: No results found for: PHART, PO2ART, LUD1RGV, WYL9PPX, BEART, W2AUFQDP     Type & Screen (If Applicable):  No results found for: [facility]    Anesthesia Evaluation  Patient summary reviewed  Airway: Mallampati: II  TM distance: >3 FB   Neck ROM: full  Mouth opening: > = 3 FB Dental: normal exam         Pulmonary:normal exam  breath sounds clear to auscultation  (+) shortness of breath: chronic,                             Cardiovascular:    (+) hyperlipidemia        Rhythm: regular  Rate: normal                    Neuro/Psych:               GI/Hepatic/Renal: Neg GI/Hepatic/Renal ROS            Endo/Other:    (+) : arthritis:., malignancy/cancer. Abdominal:           Vascular:                                        Anesthesia Plan      MAC     ASA 2       Induction: intravenous. Anesthetic plan and risks discussed with patient. Plan discussed with CRNA.                   Guilherme Villanueva MD   9/19/2018

## 2018-09-19 NOTE — ANESTHESIA POSTPROCEDURE EVALUATION
Department of Anesthesiology  Postprocedure Note    Patient: Laura Arnold  MRN: [id number]  YOB: 1930  Date of evaluation: 9/19/2018  Time:  3:34 PM     Procedure Summary     Date:  09/19/18 Room / Location:  [address]    Anesthesia Start:  0747 Anesthesia Stop:  1457    Procedure:  MOHS DEFECT REPAIR BCC DORSUM OF NOSE (N/A Face) Diagnosis:  ([address] DORSUM OF NOSE)    Surgeon:  Parvez Aly MD Responsible Provider:  Tad Palencia MD    Anesthesia Type:  MAC ASA Status:  2          Anesthesia Type: No value filed. Dimitri Phase I:      Dimitri Phase II: Dimitri Score: 10    Last vitals: Reviewed and per EMR flowsheets. Anesthesia Post Evaluation    Patient location during evaluation: PACU  Patient participation: complete - patient participated  Level of consciousness: awake and alert  Airway patency: patent  Nausea & Vomiting: no nausea and no vomiting  Complications: no  Cardiovascular status: hemodynamically stable  Respiratory status: acceptable  Hydration status: euvolemic      [facility]  POST-ANESTHESIA NOTE       Name:  Laura Arnold                                         Age:  80 y.o.   MRN:  [id number]      Last Vitals:  BP (!) 140/64   Pulse 62   Temp 97.8 °F (36.6 °C) (Temporal)   Resp 16   Ht 5' 7\" (1.702 m)   Wt 145 lb (65.8 kg)   SpO2 96%   BMI 22.71 kg/m²   Patient Vitals for the past 4 hrs:   BP Temp Temp src Pulse Resp SpO2 Height Weight   09/19/18 1459 (!) 140/64 97.8 °F (36.6 °C) Temporal 62 16 96 % - -   09/19/18 1319 (!) 151/69 98 °F (36.7 °C) Temporal 64 16 96 % 5' 7\" (1.702 m) 145 lb (65.8 kg)       Level of Consciousness:  Awake    Respiratory:  Stable    Oxygen Saturation:  Stable    Cardiovascular:  Stable    Hydration:  Adequate    PONV:  Stable    Post-op Pain:  Adequate analgesia    Post-op Assessment:  No apparent anesthetic complications    Additional Follow-Up / Treatment / Comment:  None    JOE HERNANDEZ, MD  September 19, 2018   3:34 PM

## 2018-09-19 NOTE — OP NOTE
Operative Note    Patient name: Emmalamar Murray Record Number: [id number]    Primary Care Physician: DO LOBO Wheeler 1930    Date of Procedure: 2018    Pre-operative Diagnosis: 2cm2 dorsal nose defect s/p MOHS for basal cell carcinoma    Post-operative Diagnosis: Same    Procedure Performed: Adjacent tissue transfer (8 cm2). Surgeons/Assistants: Dr. Bianca Ibrahim PA-C    Estimated Blood Loss: 3ml     Complications: none immediately appreciated    Procedure: With the patient lying in the supine position and under adequate anesthesia per the anesthesia team, the area was anesthetized with a total of 7 ml of 1% Lidocaine 1:100,000 with epinephrine solution. The area was then prepped and draped in the standard surgical fashion. There was a very sizeable defect, which could not be closed primarily. Therefore, a rotation flap was then designed, elevated and inset with 4-0 Monocryl suture placed in interrupted buried fashion. The Burrow's triangles were resected to prevent dog-ear deformity and final closure was completed using 5-0 fast absorbing suture and bacitracin. The patient tolerated the procedure quite well and remained hemodynamically stable throughout the procedure and was quite comfortable throughout the operative course.     Clinical staging for cancer cases:  Ct  Raffy Lin Needs  Electronically signed by me on 2018 at 2:50 PM

## 2018-10-01 ENCOUNTER — CARE COORDINATION (OUTPATIENT)
Dept: CARE COORDINATION | Age: 83
End: 2018-10-01

## 2018-10-24 ENCOUNTER — CARE COORDINATION (OUTPATIENT)
Dept: CARE COORDINATION | Age: 83
End: 2018-10-24

## 2018-10-24 NOTE — CARE COORDINATION
[address] [employer]    Left a message requesting return call for further information regarding the benefits of [address] [employer] program. Rodger Siddiqui DO has recognized Zay Stanford for eligibility.

## 2018-10-25 ENCOUNTER — CARE COORDINATION (OUTPATIENT)
Dept: CARE COORDINATION | Age: 83
End: 2018-10-25

## 2018-10-29 ENCOUNTER — OFFICE VISIT (OUTPATIENT)
Dept: FAMILY MEDICINE CLINIC | Age: 83
End: 2018-10-29
Payer: MEDICARE

## 2018-10-29 VITALS
HEART RATE: 94 BPM | TEMPERATURE: 98.1 F | HEIGHT: 67 IN | BODY MASS INDEX: 23.07 KG/M2 | RESPIRATION RATE: 18 BRPM | SYSTOLIC BLOOD PRESSURE: 122 MMHG | WEIGHT: 147 LBS | DIASTOLIC BLOOD PRESSURE: 58 MMHG

## 2018-10-29 DIAGNOSIS — E03.8 SUBCLINICAL HYPOTHYROIDISM: ICD-10-CM

## 2018-10-29 DIAGNOSIS — Z91.81 AT HIGH RISK FOR FALLS: ICD-10-CM

## 2018-10-29 DIAGNOSIS — E78.49 OTHER HYPERLIPIDEMIA: Primary | ICD-10-CM

## 2018-10-29 PROCEDURE — 1123F ACP DISCUSS/DSCN MKR DOCD: CPT | Performed by: FAMILY MEDICINE

## 2018-10-29 PROCEDURE — 99213 OFFICE O/P EST LOW 20 MIN: CPT | Performed by: FAMILY MEDICINE

## 2018-10-29 PROCEDURE — 1090F PRES/ABSN URINE INCON ASSESS: CPT | Performed by: FAMILY MEDICINE

## 2018-10-29 PROCEDURE — G8427 DOCREV CUR MEDS BY ELIG CLIN: HCPCS | Performed by: FAMILY MEDICINE

## 2018-10-29 PROCEDURE — G8420 CALC BMI NORM PARAMETERS: HCPCS | Performed by: FAMILY MEDICINE

## 2018-10-29 PROCEDURE — G8484 FLU IMMUNIZE NO ADMIN: HCPCS | Performed by: FAMILY MEDICINE

## 2018-10-29 PROCEDURE — 1036F TOBACCO NON-USER: CPT | Performed by: FAMILY MEDICINE

## 2018-10-29 PROCEDURE — 4040F PNEUMOC VAC/ADMIN/RCVD: CPT | Performed by: FAMILY MEDICINE

## 2018-10-29 PROCEDURE — 1101F PT FALLS ASSESS-DOCD LE1/YR: CPT | Performed by: FAMILY MEDICINE

## 2018-10-29 NOTE — PROGRESS NOTES
Raghu Alfa received counseling on the following healthy behaviors: medication adherence  Reviewed prior labs and health maintenance. Continue current medications, diet and exercise. Discussed use, benefit, and side effects of prescribed medications. Barriers to medication compliance addressed. Patient given educational materials - see patient instructions. All patient questions answered. Patient voiced understanding. On the basis of positive falls risk screening, assessment and plan is as follows: home safety tips provided, use walker regularly.

## 2018-11-27 ENCOUNTER — TELEPHONE (OUTPATIENT)
Dept: FAMILY MEDICINE CLINIC | Age: 83
End: 2018-11-27

## 2018-12-12 ENCOUNTER — OFFICE VISIT (OUTPATIENT)
Dept: FAMILY MEDICINE CLINIC | Age: 83
End: 2018-12-12
Payer: MEDICARE

## 2018-12-12 VITALS
BODY MASS INDEX: 27.23 KG/M2 | HEART RATE: 91 BPM | HEIGHT: 62 IN | RESPIRATION RATE: 12 BRPM | WEIGHT: 148 LBS | TEMPERATURE: 97.8 F | SYSTOLIC BLOOD PRESSURE: 112 MMHG | DIASTOLIC BLOOD PRESSURE: 58 MMHG

## 2018-12-12 DIAGNOSIS — N64.9 DISORDER OF BREAST: ICD-10-CM

## 2018-12-12 DIAGNOSIS — N63.23 MASS OF LOWER OUTER QUADRANT OF LEFT BREAST: ICD-10-CM

## 2018-12-12 DIAGNOSIS — N63.20 LEFT BREAST MASS: Primary | ICD-10-CM

## 2018-12-12 PROCEDURE — 1090F PRES/ABSN URINE INCON ASSESS: CPT | Performed by: FAMILY MEDICINE

## 2018-12-12 PROCEDURE — G8427 DOCREV CUR MEDS BY ELIG CLIN: HCPCS | Performed by: FAMILY MEDICINE

## 2018-12-12 PROCEDURE — 4040F PNEUMOC VAC/ADMIN/RCVD: CPT | Performed by: FAMILY MEDICINE

## 2018-12-12 PROCEDURE — 1036F TOBACCO NON-USER: CPT | Performed by: FAMILY MEDICINE

## 2018-12-12 PROCEDURE — G8484 FLU IMMUNIZE NO ADMIN: HCPCS | Performed by: FAMILY MEDICINE

## 2018-12-12 PROCEDURE — 99213 OFFICE O/P EST LOW 20 MIN: CPT | Performed by: FAMILY MEDICINE

## 2018-12-12 PROCEDURE — 1123F ACP DISCUSS/DSCN MKR DOCD: CPT | Performed by: FAMILY MEDICINE

## 2018-12-12 PROCEDURE — 1101F PT FALLS ASSESS-DOCD LE1/YR: CPT | Performed by: FAMILY MEDICINE

## 2018-12-12 PROCEDURE — G8417 CALC BMI ABV UP PARAM F/U: HCPCS | Performed by: FAMILY MEDICINE

## 2018-12-12 NOTE — PROGRESS NOTES
Maximino Mendes is a 80 y.o. female that presents for     Chief Complaint   Patient presents with    Breast Mass     painful lump in left breast for past week. states she was unable to find the lump yesterday            HPI:    Skin Mass    HPI:    Location - left breast  Length of time it has been present - 'a few days'  Recent change in size, color or shape? - Yes - states that she initially felt this several days ago, but could not locate it today  Pruritic? No  Nipple Bleeding or drainage? No  Painful?   Yes - was tender to the touch  No axillary pain      Health Maintenance   Topic Date Due    DTaP/Tdap/Td vaccine (1 - Tdap) 05/25/1949    Shingles Vaccine (1 of 2 - 2 Dose Series) 05/25/1980    Flu vaccine (1) 09/01/2018    Pneumococcal low/med risk  Completed       Past Medical History:   Diagnosis Date    Arthritis     Cancer ([facility] [address]     skin    Colitis     Colon polyps     Fatty liver     resolved per patient    Hearing loss of both ears     Hyperlipidemia     Varicosity        Past Surgical History:   Procedure Laterality Date    BLADDER SUSPENSION  1968    FOOT SURGERY Right 1970s    HYSTERECTOMY  1968    JOINT REPLACEMENT Left     left knee    JOINT REPLACEMENT Right     hip    MOHS SURGERY  1/22/14    NOSE    LA OFFICE/OUTPT VISIT,PROCEDURE ONLY N/A 9/19/2018    MOHS DEFECT REPAIR BCC DORSUM OF NOSE performed by Kobe Gray MD at [address] [facility] [address]  2008    small blockage-adhesion       Social History   Substance Use Topics    Smoking status: Never Smoker    Smokeless tobacco: Never Used    Alcohol use Yes      Comment: rare       Family History   Problem Relation Age of Onset    Arthritis Mother     Arthritis Father     Cancer Father         lung    Stroke Father     Diabetes Sister          I have reviewed the patient's past medical history, past surgical history, allergies, medications, social and family history and I have made updates where appropriate. Review of Systems        PHYSICAL EXAM:  BP (!) 112/58   Pulse 91   Temp 97.8 °F (36.6 °C) (Oral)   Resp 12   Ht 5' 2\" (1.575 m)   Wt 148 lb (67.1 kg)   BMI 27.07 kg/m²     General Appearance: well developed and well- nourished, in no acute distress  Head: normocephalic and atraumatic  Extremities: no cyanosis, clubbing or edema of the lower extremities  Psych:  Normal affect without evidence of depression oranxiety, insight and judgement are appropriate, memory appears intact  Skin: warm and dry, no rash or erythema  Breast Exam: L breast with ill defined fatty mass on the area superior to the nipple, no tenderness noted, no nipple discharge, no lymph nodes palpated, Carri Brown was present for the entire breast exam      ASSESSMENT & Hernandez Husain was seen today for breast mass. Diagnoses and all orders for this visit:    Left breast mass  -     HERNANDEZ DIGITAL DIAGNOSTIC W OR WO CAD BILATERAL; Future  -     US BREAST LIMITED LEFT; Future    Disorder of breast   -     HERNANDEZ DIGITAL DIAGNOSTIC W OR WO CAD BILATERAL; Future    Mass of lower outer quadrant of left breast   -     US BREAST LIMITED LEFT; Future    -Obtain mammogram to further evaluate findings  -Call if any worsening of sx    Return if symptoms worsen or fail to improve. Controlled Substances Monitoring:                     [facility] [place] received counseling on the following healthy behaviors: medication adherence  Reviewed prior labs and health maintenance. Continue current medications, diet and exercise. Discussed use, benefit, and side effects of prescribed medications. Barriers to medication compliance addressed. Patient given educational materials - see patient instructions. All patient questions answered. Patient voiced understanding.

## 2018-12-21 ENCOUNTER — HOSPITAL ENCOUNTER (OUTPATIENT)
Dept: WOMENS IMAGING | Age: 83
Discharge: HOME | End: 2018-12-21
Payer: MEDICARE

## 2018-12-21 ENCOUNTER — TELEPHONE (OUTPATIENT)
Dept: FAMILY MEDICINE CLINIC | Age: 83
End: 2018-12-21

## 2018-12-21 ENCOUNTER — APPOINTMENT (OUTPATIENT)
Dept: WOMENS IMAGING | Age: 83
End: 2018-12-21
Payer: MEDICARE

## 2018-12-21 DIAGNOSIS — N63.20 LEFT BREAST MASS: ICD-10-CM

## 2018-12-21 DIAGNOSIS — N64.9 DISORDER OF BREAST: ICD-10-CM

## 2018-12-21 PROCEDURE — G0279 TOMOSYNTHESIS, MAMMO: HCPCS

## 2019-01-24 ENCOUNTER — OFFICE VISIT (OUTPATIENT)
Dept: FAMILY MEDICINE CLINIC | Age: 84
End: 2019-01-24
Payer: MEDICARE

## 2019-01-24 VITALS
BODY MASS INDEX: 26.93 KG/M2 | RESPIRATION RATE: 16 BRPM | WEIGHT: 152 LBS | DIASTOLIC BLOOD PRESSURE: 58 MMHG | TEMPERATURE: 97.8 F | SYSTOLIC BLOOD PRESSURE: 118 MMHG | HEIGHT: 63 IN | HEART RATE: 68 BPM

## 2019-01-24 DIAGNOSIS — F03.90 DEMENTIA WITHOUT BEHAVIORAL DISTURBANCE, UNSPECIFIED DEMENTIA TYPE: ICD-10-CM

## 2019-01-24 DIAGNOSIS — R41.3 MEMORY LOSS: Primary | ICD-10-CM

## 2019-01-24 DIAGNOSIS — I50.32 CHRONIC DIASTOLIC CONGESTIVE HEART FAILURE (HCC): ICD-10-CM

## 2019-01-24 PROCEDURE — 99213 OFFICE O/P EST LOW 20 MIN: CPT | Performed by: FAMILY MEDICINE

## 2019-01-24 PROCEDURE — 4040F PNEUMOC VAC/ADMIN/RCVD: CPT | Performed by: FAMILY MEDICINE

## 2019-01-24 PROCEDURE — 1036F TOBACCO NON-USER: CPT | Performed by: FAMILY MEDICINE

## 2019-01-24 PROCEDURE — 1090F PRES/ABSN URINE INCON ASSESS: CPT | Performed by: FAMILY MEDICINE

## 2019-01-24 PROCEDURE — 1123F ACP DISCUSS/DSCN MKR DOCD: CPT | Performed by: FAMILY MEDICINE

## 2019-01-24 PROCEDURE — G8417 CALC BMI ABV UP PARAM F/U: HCPCS | Performed by: FAMILY MEDICINE

## 2019-01-24 PROCEDURE — G8484 FLU IMMUNIZE NO ADMIN: HCPCS | Performed by: FAMILY MEDICINE

## 2019-01-24 PROCEDURE — G8427 DOCREV CUR MEDS BY ELIG CLIN: HCPCS | Performed by: FAMILY MEDICINE

## 2019-01-24 PROCEDURE — 1101F PT FALLS ASSESS-DOCD LE1/YR: CPT | Performed by: FAMILY MEDICINE

## 2019-01-24 RX ORDER — DONEPEZIL HYDROCHLORIDE 5 MG/1
5 TABLET, FILM COATED ORAL NIGHTLY
Qty: 30 TABLET | Refills: 5 | Status: SHIPPED | OUTPATIENT
Start: 2019-01-24 | End: 2019-07-06 | Stop reason: SDUPTHER

## 2019-03-28 ENCOUNTER — OFFICE VISIT (OUTPATIENT)
Dept: FAMILY MEDICINE CLINIC | Age: 84
End: 2019-03-28
Payer: MEDICARE

## 2019-03-28 VITALS
TEMPERATURE: 97.8 F | WEIGHT: 153 LBS | SYSTOLIC BLOOD PRESSURE: 98 MMHG | HEART RATE: 88 BPM | HEIGHT: 61 IN | RESPIRATION RATE: 14 BRPM | BODY MASS INDEX: 28.89 KG/M2 | DIASTOLIC BLOOD PRESSURE: 50 MMHG

## 2019-03-28 DIAGNOSIS — R41.3 MEMORY LOSS: Chronic | ICD-10-CM

## 2019-03-28 PROCEDURE — 1090F PRES/ABSN URINE INCON ASSESS: CPT | Performed by: FAMILY MEDICINE

## 2019-03-28 PROCEDURE — 4040F PNEUMOC VAC/ADMIN/RCVD: CPT | Performed by: FAMILY MEDICINE

## 2019-03-28 PROCEDURE — 1036F TOBACCO NON-USER: CPT | Performed by: FAMILY MEDICINE

## 2019-03-28 PROCEDURE — 99213 OFFICE O/P EST LOW 20 MIN: CPT | Performed by: FAMILY MEDICINE

## 2019-03-28 PROCEDURE — G8417 CALC BMI ABV UP PARAM F/U: HCPCS | Performed by: FAMILY MEDICINE

## 2019-03-28 PROCEDURE — G8484 FLU IMMUNIZE NO ADMIN: HCPCS | Performed by: FAMILY MEDICINE

## 2019-03-28 PROCEDURE — 1123F ACP DISCUSS/DSCN MKR DOCD: CPT | Performed by: FAMILY MEDICINE

## 2019-03-28 PROCEDURE — G8427 DOCREV CUR MEDS BY ELIG CLIN: HCPCS | Performed by: FAMILY MEDICINE

## 2019-03-28 ASSESSMENT — PATIENT HEALTH QUESTIONNAIRE - PHQ9
SUM OF ALL RESPONSES TO PHQ9 QUESTIONS 1 & 2: 0
SUM OF ALL RESPONSES TO PHQ QUESTIONS 1-9: 0
SUM OF ALL RESPONSES TO PHQ QUESTIONS 1-9: 0
1. LITTLE INTEREST OR PLEASURE IN DOING THINGS: 0
2. FEELING DOWN, DEPRESSED OR HOPELESS: 0

## 2019-07-29 ENCOUNTER — OFFICE VISIT (OUTPATIENT)
Dept: FAMILY MEDICINE CLINIC | Age: 84
End: 2019-07-29
Payer: MEDICARE

## 2019-07-29 VITALS
HEIGHT: 61 IN | BODY MASS INDEX: 29.45 KG/M2 | SYSTOLIC BLOOD PRESSURE: 94 MMHG | WEIGHT: 156 LBS | DIASTOLIC BLOOD PRESSURE: 50 MMHG | TEMPERATURE: 98.4 F | HEART RATE: 88 BPM | RESPIRATION RATE: 14 BRPM

## 2019-07-29 DIAGNOSIS — F02.80 LATE ONSET ALZHEIMER'S DISEASE WITHOUT BEHAVIORAL DISTURBANCE (HCC): Chronic | ICD-10-CM

## 2019-07-29 DIAGNOSIS — E03.8 SUBCLINICAL HYPOTHYROIDISM: Primary | ICD-10-CM

## 2019-07-29 DIAGNOSIS — G30.1 LATE ONSET ALZHEIMER'S DISEASE WITHOUT BEHAVIORAL DISTURBANCE (HCC): Chronic | ICD-10-CM

## 2019-07-29 PROBLEM — I50.32 CHRONIC DIASTOLIC CONGESTIVE HEART FAILURE (HCC): Status: RESOLVED | Noted: 2017-08-15 | Resolved: 2019-07-29

## 2019-07-29 PROCEDURE — 4040F PNEUMOC VAC/ADMIN/RCVD: CPT | Performed by: FAMILY MEDICINE

## 2019-07-29 PROCEDURE — G8417 CALC BMI ABV UP PARAM F/U: HCPCS | Performed by: FAMILY MEDICINE

## 2019-07-29 PROCEDURE — 1090F PRES/ABSN URINE INCON ASSESS: CPT | Performed by: FAMILY MEDICINE

## 2019-07-29 PROCEDURE — 99214 OFFICE O/P EST MOD 30 MIN: CPT | Performed by: FAMILY MEDICINE

## 2019-07-29 PROCEDURE — 1036F TOBACCO NON-USER: CPT | Performed by: FAMILY MEDICINE

## 2019-07-29 PROCEDURE — G8427 DOCREV CUR MEDS BY ELIG CLIN: HCPCS | Performed by: FAMILY MEDICINE

## 2019-07-29 PROCEDURE — 1123F ACP DISCUSS/DSCN MKR DOCD: CPT | Performed by: FAMILY MEDICINE

## 2019-07-29 RX ORDER — DOCUSATE SODIUM 100 MG/1
2 CAPSULE, LIQUID FILLED ORAL DAILY
COMMUNITY
End: 2019-08-26 | Stop reason: SDUPTHER

## 2019-07-29 NOTE — PROGRESS NOTES
Smoking status: Never Smoker    Smokeless tobacco: Never Used   Substance Use Topics    Alcohol use: Yes     Comment: rare    Drug use: No       Family History   Problem Relation Age of Onset    Arthritis Mother     Arthritis Father     Cancer Father         lung    Stroke Father     Diabetes Sister          I have reviewed the patient's past medical history, past surgical history, allergies, medications, social and family history and I have made updates where appropriate. Review of Systems        PHYSICAL EXAM:  BP (!) 94/50   Pulse 88   Temp 98.4 °F (36.9 °C) (Oral)   Resp 14   Ht 5' 0.5\" (1.537 m)   Wt 156 lb (70.8 kg)   BMI 29.97 kg/m²     General Appearance: well developed and well- nourished, in no acute distress  Head: normocephalic and atraumatic  ENT: external ear and ear canal normal bilaterally, nose without deformity, nasal mucosa and turbinates normal without polyps, oropharynx normal, dentition is normal for age, no lipor gum lesions noted  Neck: supple and non-tender without mass, no thyromegaly or thyroid nodules, no cervical lymphadenopathy  Pulmonary/Chest: clear to auscultation bilaterally- no wheezes, rales or rhonchi, normal air movement, no respiratory distress or retractions  Cardiovascular: normal rate, regular rhythm, normal S1 and S2, no murmurs, rubs, clicks, or gallops, distal pulses intact  Extremities: no cyanosis, clubbing of the lower extremities, moderate pedal edema bilaterally  Psych:  Normal affect without evidence of depression oranxiety, insight and judgement are appropriate, memory appears impaired  Skin: warm and dry, no rash or erythema      ASSESSMENT & PLAN  Pb Posadas was seen today for follow-up and other. Diagnoses and all orders for this visit:    Subclinical hypothyroidism  -     TSH without Reflex;  Future  -     T4, Free; Future    Late onset Alzheimer's disease without behavioral disturbance    -Chronic issues stable, continue current

## 2019-08-19 ENCOUNTER — HOSPITAL ENCOUNTER (EMERGENCY)
Age: 84
Discharge: HOME | End: 2019-08-19
Payer: MEDICARE

## 2019-08-19 ENCOUNTER — HOSPITAL ENCOUNTER (EMERGENCY)
Dept: GENERAL RADIOLOGY | Age: 84
Discharge: HOME | End: 2019-08-19
Payer: MEDICARE

## 2019-08-19 VITALS
SYSTOLIC BLOOD PRESSURE: 129 MMHG | BODY MASS INDEX: 23.32 KG/M2 | HEART RATE: 74 BPM | DIASTOLIC BLOOD PRESSURE: 63 MMHG | HEIGHT: 65 IN | OXYGEN SATURATION: 96 % | RESPIRATION RATE: 16 BRPM | TEMPERATURE: 98.4 F | WEIGHT: 140 LBS

## 2019-08-19 DIAGNOSIS — S20.212A RIB CONTUSION, LEFT, INITIAL ENCOUNTER: Primary | ICD-10-CM

## 2019-08-19 PROCEDURE — 99213 OFFICE O/P EST LOW 20 MIN: CPT

## 2019-08-19 PROCEDURE — 99213 OFFICE O/P EST LOW 20 MIN: CPT | Performed by: NURSE PRACTITIONER

## 2019-08-19 PROCEDURE — 71101 X-RAY EXAM UNILAT RIBS/CHEST: CPT

## 2019-08-19 RX ORDER — LIDOCAINE 50 MG/G
OINTMENT TOPICAL
Qty: 1 TUBE | Refills: 0 | Status: SHIPPED | OUTPATIENT
Start: 2019-08-19 | End: 2020-06-09

## 2019-08-19 ASSESSMENT — ENCOUNTER SYMPTOMS
SORE THROAT: 0
COUGH: 0
SHORTNESS OF BREATH: 0
VOMITING: 0
NAUSEA: 0

## 2019-08-19 ASSESSMENT — PAIN DESCRIPTION - LOCATION: LOCATION: RIB CAGE

## 2019-08-19 ASSESSMENT — PAIN DESCRIPTION - ORIENTATION: ORIENTATION: LEFT

## 2019-08-19 ASSESSMENT — PAIN SCALES - GENERAL: PAINLEVEL_OUTOF10: 3

## 2019-08-20 ENCOUNTER — HOSPITAL ENCOUNTER (EMERGENCY)
Age: 84
Discharge: HOME | End: 2019-08-20
Payer: MEDICARE

## 2019-08-20 VITALS
SYSTOLIC BLOOD PRESSURE: 115 MMHG | BODY MASS INDEX: 23.3 KG/M2 | RESPIRATION RATE: 21 BRPM | DIASTOLIC BLOOD PRESSURE: 58 MMHG | WEIGHT: 140 LBS | HEART RATE: 64 BPM | TEMPERATURE: 98.8 F | OXYGEN SATURATION: 94 %

## 2019-08-20 DIAGNOSIS — I47.1 PAROXYSMAL SUPRAVENTRICULAR TACHYCARDIA (HCC): Primary | ICD-10-CM

## 2019-08-20 LAB
ANION GAP SERPL CALCULATED.3IONS-SCNC: 11 MEQ/L (ref 8–16)
APTT: 31.7 SECONDS (ref 22–38)
BACTERIA: ABNORMAL /HPF
BASOPHILS # BLD: 0.9 %
BASOPHILS ABSOLUTE: 0 THOU/MM3 (ref 0–0.1)
BILIRUBIN URINE: NEGATIVE
BLOOD, URINE: NEGATIVE
BUN BLDV-MCNC: 17 MG/DL (ref 7–22)
CALCIUM SERPL-MCNC: 8.7 MG/DL (ref 8.5–10.5)
CASTS 2: ABNORMAL /LPF
CASTS UA: ABNORMAL /LPF
CHARACTER, URINE: CLEAR
CHLORIDE BLD-SCNC: 103 MEQ/L (ref 98–111)
CO2: 22 MEQ/L (ref 23–33)
COLOR: YELLOW
CREAT SERPL-MCNC: 0.7 MG/DL (ref 0.4–1.2)
CRYSTALS, UA: ABNORMAL
EKG ATRIAL RATE: 105 BPM
EKG ATRIAL RATE: 79 BPM
EKG P AXIS: 42 DEGREES
EKG P-R INTERVAL: 194 MS
EKG Q-T INTERVAL: 300 MS
EKG Q-T INTERVAL: 398 MS
EKG QRS DURATION: 70 MS
EKG QRS DURATION: 82 MS
EKG QTC CALCULATION (BAZETT): 451 MS
EKG QTC CALCULATION (BAZETT): 456 MS
EKG R AXIS: 27 DEGREES
EKG R AXIS: 4 DEGREES
EKG T AXIS: 62 DEGREES
EKG T AXIS: 95 DEGREES
EKG VENTRICULAR RATE: 136 BPM
EKG VENTRICULAR RATE: 79 BPM
EOSINOPHIL # BLD: 4.5 %
EOSINOPHILS ABSOLUTE: 0.2 THOU/MM3 (ref 0–0.4)
EPITHELIAL CELLS, UA: ABNORMAL /HPF
ERYTHROCYTE [DISTWIDTH] IN BLOOD BY AUTOMATED COUNT: 14.6 % (ref 11.5–14.5)
ERYTHROCYTE [DISTWIDTH] IN BLOOD BY AUTOMATED COUNT: 52.5 FL (ref 35–45)
GFR SERPL CREATININE-BSD FRML MDRD: 79 ML/MIN/1.73M2
GLUCOSE BLD-MCNC: 111 MG/DL (ref 70–108)
GLUCOSE URINE: NEGATIVE MG/DL
HCT VFR BLD CALC: 35.5 % (ref 37–47)
HEMOGLOBIN: 11.4 GM/DL (ref 12–16)
IMMATURE GRANS (ABS): 0 THOU/MM3 (ref 0–0.07)
IMMATURE GRANULOCYTES: 0 %
INR BLD: 0.95 (ref 0.85–1.13)
KETONES, URINE: NEGATIVE
LEUKOCYTE ESTERASE, URINE: ABNORMAL
LYMPHOCYTES # BLD: 28 %
LYMPHOCYTES ABSOLUTE: 1.3 THOU/MM3 (ref 1–4.8)
MCH RBC QN AUTO: 31.3 PG (ref 26–33)
MCHC RBC AUTO-ENTMCNC: 32.1 GM/DL (ref 32.2–35.5)
MCV RBC AUTO: 97.5 FL (ref 81–99)
MISCELLANEOUS 2: ABNORMAL
MONOCYTES # BLD: 15.7 %
MONOCYTES ABSOLUTE: 0.7 THOU/MM3 (ref 0.4–1.3)
NITRITE, URINE: NEGATIVE
NUCLEATED RED BLOOD CELLS: 0 /100 WBC
OSMOLALITY CALCULATION: 274.2 MOSMOL/KG (ref 275–300)
PH UA: 7 (ref 5–9)
PLATELET # BLD: 202 THOU/MM3 (ref 130–400)
PMV BLD AUTO: 10 FL (ref 9.4–12.4)
POTASSIUM SERPL-SCNC: 3.9 MEQ/L (ref 3.5–5.2)
PRO-BNP: 143.6 PG/ML (ref 0–1800)
PROTEIN UA: NEGATIVE
RBC # BLD: 3.64 MILL/MM3 (ref 4.2–5.4)
RBC URINE: ABNORMAL /HPF
RENAL EPITHELIAL, UA: ABNORMAL
SEG NEUTROPHILS: 50.9 %
SEGMENTED NEUTROPHILS ABSOLUTE COUNT: 2.3 THOU/MM3 (ref 1.8–7.7)
SODIUM BLD-SCNC: 136 MEQ/L (ref 135–145)
SPECIFIC GRAVITY, URINE: 1.01 (ref 1–1.03)
T4 FREE: 0.78 NG/DL (ref 0.93–1.76)
TROPONIN T: < 0.01 NG/ML
TSH SERPL DL<=0.05 MIU/L-ACNC: 6.61 UIU/ML (ref 0.4–4.2)
UROBILINOGEN, URINE: 0.2 EU/DL (ref 0–1)
WBC # BLD: 4.5 THOU/MM3 (ref 4.8–10.8)
WBC UA: ABNORMAL /HPF
YEAST: ABNORMAL

## 2019-08-20 PROCEDURE — 85025 COMPLETE CBC W/AUTO DIFF WBC: CPT

## 2019-08-20 PROCEDURE — 99285 EMERGENCY DEPT VISIT HI MDM: CPT

## 2019-08-20 PROCEDURE — 84484 ASSAY OF TROPONIN QUANT: CPT

## 2019-08-20 PROCEDURE — 85610 PROTHROMBIN TIME: CPT

## 2019-08-20 PROCEDURE — 84443 ASSAY THYROID STIM HORMONE: CPT

## 2019-08-20 PROCEDURE — 93005 ELECTROCARDIOGRAM TRACING: CPT | Performed by: NURSE PRACTITIONER

## 2019-08-20 PROCEDURE — 83880 ASSAY OF NATRIURETIC PEPTIDE: CPT

## 2019-08-20 PROCEDURE — 80048 BASIC METABOLIC PNL TOTAL CA: CPT

## 2019-08-20 PROCEDURE — 84439 ASSAY OF FREE THYROXINE: CPT

## 2019-08-20 PROCEDURE — 6370000000 HC RX 637 (ALT 250 FOR IP): Performed by: NURSE PRACTITIONER

## 2019-08-20 PROCEDURE — 36415 COLL VENOUS BLD VENIPUNCTURE: CPT

## 2019-08-20 PROCEDURE — 85730 THROMBOPLASTIN TIME PARTIAL: CPT

## 2019-08-20 PROCEDURE — 81001 URINALYSIS AUTO W/SCOPE: CPT

## 2019-08-20 PROCEDURE — 93010 ELECTROCARDIOGRAM REPORT: CPT | Performed by: INTERNAL MEDICINE

## 2019-08-20 RX ORDER — IBUPROFEN 200 MG
400 TABLET ORAL ONCE
Status: COMPLETED | OUTPATIENT
Start: 2019-08-20 | End: 2019-08-20

## 2019-08-20 RX ORDER — IBUPROFEN 200 MG
TABLET ORAL
Status: DISCONTINUED
Start: 2019-08-20 | End: 2019-08-20 | Stop reason: HOSPADM

## 2019-08-20 RX ADMIN — IBUPROFEN 400 MG: 200 TABLET, FILM COATED ORAL at 03:15

## 2019-08-20 ASSESSMENT — ENCOUNTER SYMPTOMS
SHORTNESS OF BREATH: 0
ABDOMINAL PAIN: 0
NAUSEA: 0
VOMITING: 0

## 2019-08-20 ASSESSMENT — PAIN SCALES - GENERAL: PAINLEVEL_OUTOF10: 2

## 2019-08-26 RX ORDER — DOCUSATE SODIUM 100 MG/1
100 CAPSULE, LIQUID FILLED ORAL DAILY
Qty: 60 CAPSULE | Refills: 5 | Status: SHIPPED | OUTPATIENT
Start: 2019-08-26 | End: 2020-01-29 | Stop reason: ALTCHOICE

## 2019-10-22 ENCOUNTER — OFFICE VISIT (OUTPATIENT)
Dept: FAMILY MEDICINE CLINIC | Age: 84
End: 2019-10-22
Payer: MEDICARE

## 2019-10-22 VITALS
HEIGHT: 60 IN | WEIGHT: 159 LBS | BODY MASS INDEX: 31.22 KG/M2 | TEMPERATURE: 97.8 F | DIASTOLIC BLOOD PRESSURE: 50 MMHG | HEART RATE: 88 BPM | RESPIRATION RATE: 16 BRPM | SYSTOLIC BLOOD PRESSURE: 106 MMHG

## 2019-10-22 DIAGNOSIS — R60.9 DEPENDENT EDEMA: ICD-10-CM
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
## 2019-10-22 PROCEDURE — G8417 CALC BMI ABV UP PARAM F/U: HCPCS | Performed by: FAMILY MEDICINE

## 2019-10-22 PROCEDURE — G8482 FLU IMMUNIZE ORDER/ADMIN: HCPCS | Performed by: FAMILY MEDICINE

## 2019-10-22 PROCEDURE — 1036F TOBACCO NON-USER: CPT | Performed by: FAMILY MEDICINE

## 2019-10-22 PROCEDURE — 1090F PRES/ABSN URINE INCON ASSESS: CPT | Performed by: FAMILY MEDICINE

## 2019-10-22 PROCEDURE — 4040F PNEUMOC VAC/ADMIN/RCVD: CPT | Performed by: FAMILY MEDICINE

## 2019-10-22 PROCEDURE — 99213 OFFICE O/P EST LOW 20 MIN: CPT | Performed by: FAMILY MEDICINE

## 2019-10-22 PROCEDURE — G8427 DOCREV CUR MEDS BY ELIG CLIN: HCPCS | Performed by: FAMILY MEDICINE

## 2019-10-22 PROCEDURE — 1123F ACP DISCUSS/DSCN MKR DOCD: CPT | Performed by: FAMILY MEDICINE

## 2019-10-23 ENCOUNTER — TELEPHONE (OUTPATIENT)
Dept: FAMILY MEDICINE CLINIC | Age: 84
End: 2019-10-23

## 2019-12-09 DIAGNOSIS — K52.9 COLITIS: Primary | ICD-10-CM

## 2019-12-09 DIAGNOSIS — K59.09 CHRONIC CONSTIPATION: ICD-10-CM

## 2020-01-15 ENCOUNTER — OFFICE VISIT (OUTPATIENT)
Dept: FAMILY MEDICINE CLINIC | Age: 85
End: 2020-01-15
Payer: MEDICARE

## 2020-01-15 VITALS
RESPIRATION RATE: 16 BRPM | HEART RATE: 73 BPM | SYSTOLIC BLOOD PRESSURE: 138 MMHG | DIASTOLIC BLOOD PRESSURE: 70 MMHG | TEMPERATURE: 98.1 F | HEIGHT: 60 IN | BODY MASS INDEX: 32.79 KG/M2 | WEIGHT: 167 LBS

## 2020-01-15 PROCEDURE — G8482 FLU IMMUNIZE ORDER/ADMIN: HCPCS | Performed by: FAMILY MEDICINE

## 2020-01-15 PROCEDURE — 1090F PRES/ABSN URINE INCON ASSESS: CPT | Performed by: FAMILY MEDICINE

## 2020-01-15 PROCEDURE — G8417 CALC BMI ABV UP PARAM F/U: HCPCS | Performed by: FAMILY MEDICINE

## 2020-01-15 PROCEDURE — 1036F TOBACCO NON-USER: CPT | Performed by: FAMILY MEDICINE

## 2020-01-15 PROCEDURE — 4040F PNEUMOC VAC/ADMIN/RCVD: CPT | Performed by: FAMILY MEDICINE

## 2020-01-15 PROCEDURE — G8427 DOCREV CUR MEDS BY ELIG CLIN: HCPCS | Performed by: FAMILY MEDICINE

## 2020-01-15 PROCEDURE — 99214 OFFICE O/P EST MOD 30 MIN: CPT | Performed by: FAMILY MEDICINE

## 2020-01-15 PROCEDURE — 1123F ACP DISCUSS/DSCN MKR DOCD: CPT | Performed by: FAMILY MEDICINE

## 2020-01-15 RX ORDER — OXYBUTYNIN CHLORIDE 5 MG/1
5 TABLET, EXTENDED RELEASE ORAL DAILY
Qty: 30 TABLET | Refills: 5 | Status: SHIPPED | OUTPATIENT
Start: 2020-01-15 | End: 2020-01-29 | Stop reason: SDUPTHER

## 2020-01-15 ASSESSMENT — PATIENT HEALTH QUESTIONNAIRE - PHQ9
2. FEELING DOWN, DEPRESSED OR HOPELESS: 0
SUM OF ALL RESPONSES TO PHQ QUESTIONS 1-9: 0
SUM OF ALL RESPONSES TO PHQ9 QUESTIONS 1 & 2: 0
1. LITTLE INTEREST OR PLEASURE IN DOING THINGS: 0
SUM OF ALL RESPONSES TO PHQ QUESTIONS 1-9: 0

## 2020-01-16 LAB
ORGANISM: ABNORMAL
URINE CULTURE, ROUTINE: ABNORMAL

## 2020-01-29 ENCOUNTER — OFFICE VISIT (OUTPATIENT)
Dept: FAMILY MEDICINE CLINIC | Age: 85
End: 2020-01-29
Payer: MEDICARE

## 2020-01-29 VITALS
HEIGHT: 61 IN | HEART RATE: 62 BPM | SYSTOLIC BLOOD PRESSURE: 134 MMHG | BODY MASS INDEX: 29.83 KG/M2 | TEMPERATURE: 98.2 F | WEIGHT: 158 LBS | RESPIRATION RATE: 10 BRPM | DIASTOLIC BLOOD PRESSURE: 60 MMHG

## 2020-01-29 PROCEDURE — 1123F ACP DISCUSS/DSCN MKR DOCD: CPT | Performed by: FAMILY MEDICINE

## 2020-01-29 PROCEDURE — 99213 OFFICE O/P EST LOW 20 MIN: CPT | Performed by: FAMILY MEDICINE

## 2020-01-29 PROCEDURE — 1090F PRES/ABSN URINE INCON ASSESS: CPT | Performed by: FAMILY MEDICINE

## 2020-01-29 PROCEDURE — G8482 FLU IMMUNIZE ORDER/ADMIN: HCPCS | Performed by: FAMILY MEDICINE

## 2020-01-29 PROCEDURE — G8417 CALC BMI ABV UP PARAM F/U: HCPCS | Performed by: FAMILY MEDICINE

## 2020-01-29 PROCEDURE — 1036F TOBACCO NON-USER: CPT | Performed by: FAMILY MEDICINE

## 2020-01-29 PROCEDURE — 4040F PNEUMOC VAC/ADMIN/RCVD: CPT | Performed by: FAMILY MEDICINE

## 2020-01-29 PROCEDURE — G0438 PPPS, INITIAL VISIT: HCPCS | Performed by: FAMILY MEDICINE

## 2020-01-29 PROCEDURE — G8427 DOCREV CUR MEDS BY ELIG CLIN: HCPCS | Performed by: FAMILY MEDICINE

## 2020-01-29 RX ORDER — LANOLIN ALCOHOL/MO/W.PET/CERES
3 CREAM (GRAM) TOPICAL NIGHTLY
Qty: 30 TABLET | Refills: 5 | Status: SHIPPED | OUTPATIENT
Start: 2020-01-29 | End: 2020-04-28

## 2020-01-29 RX ORDER — OXYBUTYNIN CHLORIDE 10 MG/1
10 TABLET, EXTENDED RELEASE ORAL DAILY
Qty: 30 TABLET | Refills: 5 | Status: SHIPPED | OUTPATIENT
Start: 2020-01-29 | End: 2020-02-27

## 2020-01-29 ASSESSMENT — LIFESTYLE VARIABLES
HOW MANY STANDARD DRINKS CONTAINING ALCOHOL DO YOU HAVE ON A TYPICAL DAY: 1
HOW OFTEN DURING THE LAST YEAR HAVE YOU FAILED TO DO WHAT WAS NORMALLY EXPECTED FROM YOU BECAUSE OF DRINKING: 0
HAS A RELATIVE, FRIEND, DOCTOR, OR ANOTHER HEALTH PROFESSIONAL EXPRESSED CONCERN ABOUT YOUR DRINKING OR SUGGESTED YOU CUT DOWN: 0
HAVE YOU OR SOMEONE ELSE BEEN INJURED AS A RESULT OF YOUR DRINKING: 0
HOW OFTEN DURING THE LAST YEAR HAVE YOU BEEN UNABLE TO REMEMBER WHAT HAPPENED THE NIGHT BEFORE BECAUSE YOU HAD BEEN DRINKING: 0
HOW OFTEN DO YOU HAVE A DRINK CONTAINING ALCOHOL: 2
HOW OFTEN DURING THE LAST YEAR HAVE YOU FOUND THAT YOU WERE NOT ABLE TO STOP DRINKING ONCE YOU HAD STARTED: 0
HOW OFTEN DO YOU HAVE SIX OR MORE DRINKS ON ONE OCCASION: 0
HOW OFTEN DURING THE LAST YEAR HAVE YOU NEEDED AN ALCOHOLIC DRINK FIRST THING IN THE MORNING TO GET YOURSELF GOING AFTER A NIGHT OF HEAVY DRINKING: 0
AUDIT TOTAL SCORE: 3
HOW OFTEN DURING THE LAST YEAR HAVE YOU HAD A FEELING OF GUILT OR REMORSE AFTER DRINKING: 0
AUDIT-C TOTAL SCORE: 3

## 2020-01-29 ASSESSMENT — PATIENT HEALTH QUESTIONNAIRE - PHQ9
SUM OF ALL RESPONSES TO PHQ QUESTIONS 1-9: 0
SUM OF ALL RESPONSES TO PHQ QUESTIONS 1-9: 0

## 2020-01-29 NOTE — PROGRESS NOTES
Arthritis Mother     Arthritis Father     Cancer Father         lung    Stroke Father     Diabetes Sister          I have reviewed the patient's past medical history, past surgical history, allergies, medications, social and family history and I have made updates where appropriate. Review of Systems        PHYSICAL EXAM:  /60   Pulse 62   Temp 98.2 °F (36.8 °C) (Oral)   Resp 10   Ht 5' 1\" (1.549 m)   Wt 158 lb (71.7 kg)   BMI 29.85 kg/m²     General Appearance: well developed and well- nourished, in no acute distress  Head: normocephalic and atraumatic  ENT: external ear and ear canal normal bilaterally, nose without deformity, nasal mucosa and turbinates normal without polyps, oropharynx normal, dentition is normal for age, no lipor gum lesions noted  Neck: supple and non-tender without mass, no thyromegaly or thyroid nodules, no cervical lymphadenopathy  Pulmonary/Chest: clear to auscultation bilaterally- no wheezes, rales or rhonchi, normal air movement, no respiratory distress or retractions  Cardiovascular: normal rate, regular rhythm, normal S1 and S2, no murmurs, rubs, clicks, or gallops, distal pulses intact  Extremities: no cyanosis, clubbing or edema of the lower extremities  Psych:  Normal affect without evidence of depression or anxiety, insight and judgement are impaired, memory appears impaired  Skin: warm and dry, no rash or erythema      ASSESSMENT & PLAN  Judith Torres was seen today for medicare aw and 6 month follow-up. Diagnoses and all orders for this visit:    Routine general medical examination at a health care facility    Urinary frequency  -     oxybutynin (DITROPAN-XL) 10 MG extended release tablet; Take 1 tablet by mouth daily    Primary insomnia  -     melatonin (RA MELATONIN) 3 MG TABS tablet;  Take 1 tablet by mouth nightly    Late onset Alzheimer's disease without behavioral disturbance ([address]    -increase ditropan for uncontrolled urinary frequency  -Will start melatonin for insomnia    Return in about 6 months (around 7/29/2020), or if symptoms worsen or fail to improve. Controlled Substance Monitoring:    Acute and Chronic Pain Monitoring:   RX Monitoring 1/26/2017   Attestation The Prescription Monitoring Report for this patient was reviewed today. Periodic Controlled Substance Monitoring No signs of potential drug abuse or diversion identified. Salina Murdock received counseling on the following healthy behaviors: medication adherence  Reviewed prior labs and health maintenance. Continue current medications, diet and exercise. Discussed use, benefit, and side effects of prescribed medications. Barriers to medication compliance addressed. Patient given educational materials - see patient instructions. All patient questions answered. Patient voiced understanding.

## 2020-01-29 NOTE — PROGRESS NOTES
Medicare Annual Wellness Visit  Name: Estefany Quintanilla Date: 2020   MRN: [id number] Sex: Female   Age: 80 y.o. Ethnicity: Non-/Non    : 1930 Race: Ludy West is here for Medicare AWV and 6 Month Follow-Up (hasnt been sleeping good at night but hasnt been taking the donepezil- she says she doesnt have any meds at night)    Screenings for behavioral, psychosocial and functional/safety risks, and cognitive dysfunction are all negative except as indicated below. These results, as well as other patient data from the [address] form, are documented in Flowsheets linked to this Encounter. Allergies   Allergen Reactions    Sulfa Antibiotics Rash       Prior to Visit Medications    Medication Sig Taking? Authorizing Provider   oxybutynin (DITROPAN XL) 5 MG extended release tablet Take 1 tablet by mouth daily  Patient taking differently: Take 5 mg by mouth daily Takes as needed Yes Armando Olvera DO   Psyllium (METAMUCIL) 28.3 % POWD 1 tablespoon in 8 oz water daily by mouth Yes Jagdeep Gomes DO   aspirin 325 MG EC tablet Take 325 mg by mouth daily Yes Historical Provider, MD   lidocaine (XYLOCAINE) 5 % ointment Apply topically as needed. Yes DHEERAJ Ríos - CNP   MAGNESIUM CL-CALCIUM CARBONATE PO Take by mouth Takes as needed Yes Historical Provider, MD   ibuprofen (ADVIL;MOTRIN) 200 MG tablet Take 400 mg by mouth every 8 hours as needed for Pain  Yes Historical Provider, MD   donepezil (ARICEPT) 5 MG tablet take 1 tablet by mouth ONCE NIGHTLY.   Patient not taking: Reported on 2020  Armando Olvera DO       Past Medical History:   Diagnosis Date    Arthritis     Cancer [facility])     skin    Colitis     Colon polyps     Fatty liver     resolved per patient    Hearing loss of both ears     Hyperlipidemia     Varicose vein of leg     Varicosity        Past Surgical History:   Procedure Laterality Date    BLADDER SUSPENSION      FOOT the past year?: (!) No  Body mass index is 29.85 kg/m². Health Habits/Nutrition Interventions:  · Dental exam overdue:  patient encouraged to make appointment with his/her dentist    Hearing/Vision:  No exam data present  Hearing/Vision  Do you or your family notice any trouble with your hearing?: (!) Yes  Do you have difficulty driving, watching TV, or doing any of your daily activities because of your eyesight?: (doesnt drive)  Have you had an eye exam within the past year?: (!) No  Hearing/Vision Interventions:  · Vision concerns:  patient encouraged to make appointment with his/her eye specialist, patient declines any further evaluation/treatment for this issue    ADL:  ADLs  In the past 7 days, did you need help from others to perform any of the following everyday activities? Eating, dressing, grooming, bathing, toileting, or walking/balance?: None  In the past 7 days, did you need help from others to take care of any of the following?  Laundry, housekeeping, banking/finances, shopping, telephone use, food preparation, transportation, or taking medications?: [employer], Housekeeping, Food Preparation, Transportation, Taking Medications  ADL Interventions:  · Patient lives at [address] [place] [address] that helps with IADLs    Personalized Preventive Plan   Current Health Maintenance Status  Immunization History   Administered Date(s) Administered    Influenza Vaccine, unspecified formulation 10/01/2016    Influenza Virus Vaccine 10/05/2015, 12/01/2017    Influenza, High Dose (Fluzone 65 yrs and older) 10/21/2019    Pneumococcal Conjugate 13-valent ([id number]) 10/05/2015    Pneumococcal Polysaccharide (Tryeunnxz65) 08/12/2014        Health Maintenance   Topic Date Due    DTaP/Tdap/Td vaccine (1 - Tdap) 05/25/1941    Shingles Vaccine (1 of 2) 05/25/1980    Annual Wellness Visit (AWV)  05/29/2019    Flu vaccine  Completed    Pneumococcal 65+ years Vaccine  Completed     Recommendations for Preventive Services Due: see orders and patient instructions/AVS.  . Recommended screening schedule for the next 5-10 years is provided to the patient in written form: see Patient Molly Brown was seen today for medicare awv and 6 month follow-up.     Diagnoses and all orders for this visit:    Routine general medical examination at a health care facility

## 2020-01-31 ENCOUNTER — HOSPITAL ENCOUNTER (OUTPATIENT)
Dept: WOMENS IMAGING | Age: 85
Discharge: HOME | End: 2020-01-31
Payer: MEDICARE

## 2020-01-31 PROCEDURE — 77063 BREAST TOMOSYNTHESIS BI: CPT

## 2020-02-21 ENCOUNTER — TELEPHONE (OUTPATIENT)
Dept: FAMILY MEDICINE CLINIC | Age: 85
End: 2020-02-21

## 2020-02-27 ENCOUNTER — OFFICE VISIT (OUTPATIENT)
Dept: FAMILY MEDICINE CLINIC | Age: 85
End: 2020-02-27
Payer: MEDICARE

## 2020-02-27 VITALS
BODY MASS INDEX: 30.21 KG/M2 | DIASTOLIC BLOOD PRESSURE: 54 MMHG | TEMPERATURE: 98.1 F | HEART RATE: 64 BPM | WEIGHT: 160 LBS | SYSTOLIC BLOOD PRESSURE: 126 MMHG | HEIGHT: 61 IN | RESPIRATION RATE: 12 BRPM

## 2020-02-27 PROCEDURE — G8427 DOCREV CUR MEDS BY ELIG CLIN: HCPCS | Performed by: FAMILY MEDICINE

## 2020-02-27 PROCEDURE — 4040F PNEUMOC VAC/ADMIN/RCVD: CPT | Performed by: FAMILY MEDICINE

## 2020-02-27 PROCEDURE — 99213 OFFICE O/P EST LOW 20 MIN: CPT | Performed by: FAMILY MEDICINE

## 2020-02-27 PROCEDURE — 1036F TOBACCO NON-USER: CPT | Performed by: FAMILY MEDICINE

## 2020-02-27 PROCEDURE — 1123F ACP DISCUSS/DSCN MKR DOCD: CPT | Performed by: FAMILY MEDICINE

## 2020-02-27 PROCEDURE — G8482 FLU IMMUNIZE ORDER/ADMIN: HCPCS | Performed by: FAMILY MEDICINE

## 2020-02-27 PROCEDURE — 1090F PRES/ABSN URINE INCON ASSESS: CPT | Performed by: FAMILY MEDICINE

## 2020-02-27 PROCEDURE — G8417 CALC BMI ABV UP PARAM F/U: HCPCS | Performed by: FAMILY MEDICINE

## 2020-02-27 RX ORDER — SOLIFENACIN SUCCINATE 5 MG/1
5 TABLET, FILM COATED ORAL DAILY
Qty: 30 TABLET | Refills: 5 | Status: SHIPPED | OUTPATIENT
Start: 2020-02-27 | End: 2020-06-09

## 2020-02-27 RX ORDER — DONEPEZIL HYDROCHLORIDE 5 MG/1
TABLET, FILM COATED ORAL
Qty: 90 TABLET | Refills: 3 | Status: SHIPPED | OUTPATIENT
Start: 2020-02-27

## 2020-02-27 NOTE — PROGRESS NOTES
Problem Relation Age of Onset    Arthritis Mother     Arthritis Father     Cancer Father         lung    Stroke Father     Diabetes Sister          I have reviewed the patient's past medical history, past surgical history, allergies, medications, social and family history and I have made updates where appropriate. Review of Systems        PHYSICAL EXAM:  BP (!) 126/54   Pulse 64   Temp 98.1 °F (36.7 °C) (Oral)   Resp 12   Ht 5' 1\" (1.549 m)   Wt 160 lb (72.6 kg)   BMI 30.23 kg/m²     General Appearance: well developed and well- nourished, in no acute distress  Head: normocephalic and atraumatic  ENT: external ear and ear canal normal bilaterally, nose without deformity, nasal mucosa and turbinates normal without polyps, oropharynx normal, dentition is normal for age, no lipor gum lesions noted  Neck: supple and non-tender without mass, no thyromegaly or thyroid nodules, no cervical lymphadenopathy  Pulmonary/Chest: clear to auscultation bilaterally- no wheezes, rales or rhonchi, normal air movement, no respiratory distress or retractions  Cardiovascular: normal rate, regular rhythm, normal S1 and S2, no murmurs, rubs, clicks, or gallops, distal pulses intact  Extremities: no cyanosis, clubbing or edema of the lower extremities  Psych:  Normal affect without evidence of depression oranxiety, insight and judgement are appropriate, memory appears intact  Skin: warm and dry, no rash or erythema      ASSESSMENT & PLAN  Salina Murdock was seen today for other and incontinence. Diagnoses and all orders for this visit:    Urinary frequency  -     solifenacin (VESICARE) 5 MG tablet; Take 1 tablet by mouth daily    Memory loss  -     donepezil (ARICEPT) 5 MG tablet; take 1 tablet by mouth ONCE NIGHTLY. Dementia without behavioral disturbance, unspecified dementia type (HCC)  -     donepezil (ARICEPT) 5 MG tablet; take 1 tablet by mouth ONCE NIGHTLY.     Generalized weakness  -     External Referral To Physical

## 2020-03-04 ENCOUNTER — TELEPHONE (OUTPATIENT)
Dept: FAMILY MEDICINE CLINIC | Age: 85
End: 2020-03-04

## 2020-03-04 NOTE — TELEPHONE ENCOUNTER
Salina Kyle at  [facility]  Requesting last office note .    Faxed to [phone]    Ph. [phone]

## 2020-03-25 PROBLEM — R00.2 PALPITATIONS: Status: RESOLVED | Noted: 2020-03-25 | Resolved: 2020-03-24

## 2020-04-23 ENCOUNTER — TELEPHONE (OUTPATIENT)
Dept: FAMILY MEDICINE CLINIC | Age: 85
End: 2020-04-23

## 2020-04-28 ENCOUNTER — VIRTUAL VISIT (OUTPATIENT)
Dept: UROLOGY | Age: 85
End: 2020-04-28
Payer: MEDICARE

## 2020-04-28 PROCEDURE — 99442 PR PHYS/QHP TELEPHONE EVALUATION 11-20 MIN: CPT | Performed by: UROLOGY

## 2020-04-28 RX ORDER — ACETAMINOPHEN 325 MG/1
650 TABLET ORAL EVERY 6 HOURS PRN
COMMUNITY
End: 2020-06-09

## 2020-04-28 RX ORDER — DOCUSATE SODIUM 100 MG/1
100 CAPSULE, LIQUID FILLED ORAL 2 TIMES DAILY
COMMUNITY
End: 2020-06-09

## 2020-04-28 ASSESSMENT — ENCOUNTER SYMPTOMS
EYE REDNESS: 0
BACK PAIN: 0
SHORTNESS OF BREATH: 0
VOMITING: 0
CHEST TIGHTNESS: 0
COUGH: 0
DIARRHEA: 0
EYE PAIN: 0
RHINORRHEA: 0

## 2020-04-28 NOTE — PROGRESS NOTES
Julien Hendrickson is a 80 y.o. female evaluated via telephone on 4/28/2020. Consent:  She and/or health care decision maker is aware that that she may receive a bill for this telephone service, depending on her insurance coverage, and has provided verbal consent to proceed: Yes      Documentation:  I communicated with the patient and/or health care decision maker about overactive bladder. Details of this discussion including any medical advice provided:     Nocturia. Very bothered  Does not wear pads. No stress incontinence  Has not tried medications in the past.  No problems voiding  No other  history. No hx of recurrent UTI, kidney stones or hematuria. No dysuria or pain currently    Plan: Will start mirabegon 50 mg daily  Follow up in six weeks for in office visit with Efrain Guzman. If the medication is too expensive she will call the office and we will provide an alternative. I affirm this is a Patient Initiated Episode with an Established Patient who has not had a related appointment within my department in the past 7 days or scheduled within the next 24 hours.     Patient identification was verified at the start of the visit: Yes    Total Time: minutes: 11-20 minutes    Note: not billable if this call serves to triage the patient into an appointment for the relevant concern      Ok Filter

## 2020-06-01 ENCOUNTER — TELEPHONE (OUTPATIENT)
Dept: UROLOGY | Age: 85
End: 2020-06-01

## 2020-06-09 ENCOUNTER — OFFICE VISIT (OUTPATIENT)
Dept: UROLOGY | Age: 85
End: 2020-06-09
Payer: MEDICARE

## 2020-06-09 VITALS — WEIGHT: 150 LBS | BODY MASS INDEX: 27.6 KG/M2 | TEMPERATURE: 99.4 F | HEIGHT: 62 IN

## 2020-06-09 LAB — POST VOID RESIDUAL (PVR): 260 ML

## 2020-06-09 PROCEDURE — 1036F TOBACCO NON-USER: CPT | Performed by: UROLOGY

## 2020-06-09 PROCEDURE — 51798 US URINE CAPACITY MEASURE: CPT | Performed by: UROLOGY

## 2020-06-09 PROCEDURE — 4040F PNEUMOC VAC/ADMIN/RCVD: CPT | Performed by: UROLOGY

## 2020-06-09 PROCEDURE — 1090F PRES/ABSN URINE INCON ASSESS: CPT | Performed by: UROLOGY

## 2020-06-09 PROCEDURE — G8417 CALC BMI ABV UP PARAM F/U: HCPCS | Performed by: UROLOGY

## 2020-06-09 PROCEDURE — 99213 OFFICE O/P EST LOW 20 MIN: CPT | Performed by: UROLOGY

## 2020-06-09 PROCEDURE — G8427 DOCREV CUR MEDS BY ELIG CLIN: HCPCS | Performed by: UROLOGY

## 2020-06-09 PROCEDURE — 1123F ACP DISCUSS/DSCN MKR DOCD: CPT | Performed by: UROLOGY

## 2020-06-24 ENCOUNTER — OFFICE VISIT (OUTPATIENT)
Dept: FAMILY MEDICINE CLINIC | Age: 85
End: 2020-06-24
Payer: MEDICARE

## 2020-06-24 VITALS
BODY MASS INDEX: 30.82 KG/M2 | RESPIRATION RATE: 14 BRPM | TEMPERATURE: 98.1 F | OXYGEN SATURATION: 96 % | DIASTOLIC BLOOD PRESSURE: 58 MMHG | SYSTOLIC BLOOD PRESSURE: 126 MMHG | WEIGHT: 157 LBS | HEIGHT: 60 IN | HEART RATE: 80 BPM

## 2020-06-24 PROCEDURE — G8427 DOCREV CUR MEDS BY ELIG CLIN: HCPCS | Performed by: FAMILY MEDICINE

## 2020-06-24 PROCEDURE — 1123F ACP DISCUSS/DSCN MKR DOCD: CPT | Performed by: FAMILY MEDICINE

## 2020-06-24 PROCEDURE — 4040F PNEUMOC VAC/ADMIN/RCVD: CPT | Performed by: FAMILY MEDICINE

## 2020-06-24 PROCEDURE — 1036F TOBACCO NON-USER: CPT | Performed by: FAMILY MEDICINE

## 2020-06-24 PROCEDURE — 99213 OFFICE O/P EST LOW 20 MIN: CPT | Performed by: FAMILY MEDICINE

## 2020-06-24 PROCEDURE — G8417 CALC BMI ABV UP PARAM F/U: HCPCS | Performed by: FAMILY MEDICINE

## 2020-06-24 PROCEDURE — 1090F PRES/ABSN URINE INCON ASSESS: CPT | Performed by: FAMILY MEDICINE

## 2020-06-24 RX ORDER — TRAZODONE HYDROCHLORIDE 50 MG/1
25 TABLET ORAL NIGHTLY PRN
Qty: 15 TABLET | Refills: 5 | Status: SHIPPED | OUTPATIENT
Start: 2020-06-24

## 2020-06-24 NOTE — PROGRESS NOTES
Shanta Swan is a 80 y.o. female that presents for     Chief Complaint   Patient presents with    Urinary Frequency     going on for  3 months  several times per night - no new concerns        BP (!) 126/58   Pulse 80   Temp 98.1 °F (36.7 °C) (Oral)   Resp 14   Ht 5' (1.524 m)   Wt 157 lb (71.2 kg)   SpO2 96%   BMI 30.66 kg/m²       HPI:    Patient states that she is present to discuss urinary frequency. She has a hx of OAB. This is a chronic issue. I had referred her to Urology at her last appt with me to further evaluate this. I had placed her on several anti cholinergic medications without improvement. Dr Bryan Bermudez started her on Myrbetriq on 6/9. States that she is not going as often as often. States that she is still urinating a lot at night and then she has difficulty falling back asleep. Wakes up 1-2 times per night. Does feel like she does have to urinate more during the day. No dysuria or hematuria.       Health Maintenance   Topic Date Due    DTaP/Tdap/Td vaccine (1 - Tdap) 05/25/1949    Shingles Vaccine (1 of 2) 05/25/1980    Annual Wellness Visit (AWV)  01/29/2021    Flu vaccine  Completed    Pneumococcal 65+ years Vaccine  Completed    Hepatitis A vaccine  Aged Out    Hepatitis B vaccine  Aged Out    Hib vaccine  Aged Out    Meningococcal (ACWY) vaccine  Aged Out       Past Medical History:   Diagnosis Date    Arthritis     Cancer ([facility] [address]     skin    Colitis     Colon polyps     Fatty liver     resolved per patient    Hearing loss of both ears     Hyperlipidemia     Varicose vein of leg     Varicosity        Past Surgical History:   Procedure Laterality Date    BLADDER SUSPENSION  1968    FOOT SURGERY Right 1970s    HYSTERECTOMY  1968    JOINT REPLACEMENT Left     left knee    JOINT REPLACEMENT Right     hip    MOHS SURGERY  1/22/14    NOSE    DE OFFICE/OUTPT VISIT,PROCEDURE ONLY N/A 9/19/2018    MOHS DEFECT REPAIR BCC DORSUM OF NOSE performed by Susie Menchaca MD at [facility] SMALL INTESTINE SURGERY  2008    small blockage-adhesion       Social History     Tobacco Use    Smoking status: Never Smoker    Smokeless tobacco: Never Used   Substance Use Topics    Alcohol use: Yes     Comment: rare    Drug use: No       Family History   Problem Relation Age of Onset    Arthritis Mother     Arthritis Father     Cancer Father         lung    Stroke Father     Diabetes Sister          I have reviewed the patient's past medical history, past surgical history, allergies, medications, social and family history and I have made updates where appropriate. Review of Systems        PHYSICAL EXAM:  BP (!) 126/58   Pulse 80   Temp 98.1 °F (36.7 °C) (Oral)   Resp 14   Ht 5' (1.524 m)   Wt 157 lb (71.2 kg)   SpO2 96% Comment: R/a  BMI 30.66 kg/m²     General Appearance: well developed and well- nourished, in no acute distress  Head: normocephalic and atraumatic  ENT: external ear and ear canal normal bilaterally, nose without deformity, nasal mucosa and turbinates normal without polyps, oropharynx normal, dentition is normal for age, no lipor gum lesions noted  Neck: supple and non-tender without mass, no thyromegaly or thyroid nodules, no cervical lymphadenopathy  Pulmonary/Chest: clear to auscultation bilaterally- no wheezes, rales or rhonchi, normal air movement, no respiratory distress or retractions  Cardiovascular: normal rate, regular rhythm, normal S1 and S2, no murmurs, rubs, clicks, or gallops, distal pulses intact  Extremities: no cyanosis, clubbing or edema of the lower extremities  Skin: warm and dry, no rash or erythema      ASSESSMENT & PLAN  Janny Desai was seen today for urinary frequency. Diagnoses and all orders for this visit:    Urinary frequency  -     External Referral To Urology    Other insomnia  -     traZODone (DESYREL) 50 MG tablet;  Take 0.5 tablets by mouth nightly as needed for Sleep    -She would like to try something for sleep as

## 2020-07-13 ENCOUNTER — HOSPITAL ENCOUNTER (EMERGENCY)
Age: 85
Discharge: HOME | End: 2020-07-13
Attending: EMERGENCY MEDICINE
Payer: MEDICARE

## 2020-07-13 ENCOUNTER — APPOINTMENT (OUTPATIENT)
Dept: GENERAL RADIOLOGY | Age: 85
End: 2020-07-13
Payer: MEDICARE

## 2020-07-13 VITALS
TEMPERATURE: 97.7 F | SYSTOLIC BLOOD PRESSURE: 135 MMHG | DIASTOLIC BLOOD PRESSURE: 62 MMHG | BODY MASS INDEX: 19.99 KG/M2 | OXYGEN SATURATION: 98 % | WEIGHT: 120 LBS | HEART RATE: 50 BPM | RESPIRATION RATE: 18 BRPM | HEIGHT: 65 IN

## 2020-07-13 LAB
ANION GAP SERPL CALCULATED.3IONS-SCNC: 12 MEQ/L (ref 8–16)
BASOPHILS # BLD: 0.6 %
BASOPHILS ABSOLUTE: 0 THOU/MM3 (ref 0–0.1)
BUN BLDV-MCNC: 15 MG/DL (ref 7–22)
CALCIUM SERPL-MCNC: 8.9 MG/DL (ref 8.5–10.5)
CHLORIDE BLD-SCNC: 105 MEQ/L (ref 98–111)
CO2: 21 MEQ/L (ref 23–33)
CREAT SERPL-MCNC: 0.6 MG/DL (ref 0.4–1.2)
EKG ATRIAL RATE: 69 BPM
EKG P AXIS: 66 DEGREES
EKG P-R INTERVAL: 192 MS
EKG Q-T INTERVAL: 406 MS
EKG QRS DURATION: 80 MS
EKG QTC CALCULATION (BAZETT): 435 MS
EKG R AXIS: 10 DEGREES
EKG T AXIS: 76 DEGREES
EKG VENTRICULAR RATE: 69 BPM
EOSINOPHIL # BLD: 4.6 %
EOSINOPHILS ABSOLUTE: 0.2 THOU/MM3 (ref 0–0.4)
ERYTHROCYTE [DISTWIDTH] IN BLOOD BY AUTOMATED COUNT: 15 % (ref 11.5–14.5)
ERYTHROCYTE [DISTWIDTH] IN BLOOD BY AUTOMATED COUNT: 53.9 FL (ref 35–45)
GFR SERPL CREATININE-BSD FRML MDRD: > 90 ML/MIN/1.73M2
GLUCOSE BLD-MCNC: 90 MG/DL (ref 70–108)
HCT VFR BLD CALC: 38.2 % (ref 37–47)
HEMOGLOBIN: 12.4 GM/DL (ref 12–16)
IMMATURE GRANS (ABS): 0 THOU/MM3 (ref 0–0.07)
IMMATURE GRANULOCYTES: 0 %
LYMPHOCYTES # BLD: 29.8 %
LYMPHOCYTES ABSOLUTE: 1.5 THOU/MM3 (ref 1–4.8)
MCH RBC QN AUTO: 31.6 PG (ref 26–33)
MCHC RBC AUTO-ENTMCNC: 32.5 GM/DL (ref 32.2–35.5)
MCV RBC AUTO: 97.4 FL (ref 81–99)
MONOCYTES # BLD: 10.5 %
MONOCYTES ABSOLUTE: 0.5 THOU/MM3 (ref 0.4–1.3)
NUCLEATED RED BLOOD CELLS: 0 /100 WBC
OSMOLALITY CALCULATION: 276 MOSMOL/KG (ref 275–300)
PLATELET # BLD: 236 THOU/MM3 (ref 130–400)
PMV BLD AUTO: 9.7 FL (ref 9.4–12.4)
POTASSIUM SERPL-SCNC: 4.2 MEQ/L (ref 3.5–5.2)
RBC # BLD: 3.92 MILL/MM3 (ref 4.2–5.4)
SEG NEUTROPHILS: 54.5 %
SEGMENTED NEUTROPHILS ABSOLUTE COUNT: 2.8 THOU/MM3 (ref 1.8–7.7)
SODIUM BLD-SCNC: 138 MEQ/L (ref 135–145)
WBC # BLD: 5.2 THOU/MM3 (ref 4.8–10.8)

## 2020-07-13 PROCEDURE — 93010 ELECTROCARDIOGRAM REPORT: CPT | Performed by: NUCLEAR MEDICINE

## 2020-07-13 PROCEDURE — 93005 ELECTROCARDIOGRAM TRACING: CPT | Performed by: EMERGENCY MEDICINE

## 2020-07-13 PROCEDURE — 73030 X-RAY EXAM OF SHOULDER: CPT

## 2020-07-13 PROCEDURE — 96374 THER/PROPH/DIAG INJ IV PUSH: CPT

## 2020-07-13 PROCEDURE — 96375 TX/PRO/DX INJ NEW DRUG ADDON: CPT

## 2020-07-13 PROCEDURE — 99285 EMERGENCY DEPT VISIT HI MDM: CPT

## 2020-07-13 PROCEDURE — 80048 BASIC METABOLIC PNL TOTAL CA: CPT

## 2020-07-13 PROCEDURE — 85025 COMPLETE CBC W/AUTO DIFF WBC: CPT

## 2020-07-13 PROCEDURE — 36415 COLL VENOUS BLD VENIPUNCTURE: CPT

## 2020-07-13 PROCEDURE — 6360000002 HC RX W HCPCS: Performed by: EMERGENCY MEDICINE

## 2020-07-13 RX ORDER — HYDROCODONE BITARTRATE AND ACETAMINOPHEN 5; 325 MG/1; MG/1
1 TABLET ORAL EVERY 6 HOURS PRN
Qty: 12 TABLET | Refills: 0 | Status: SHIPPED | OUTPATIENT
Start: 2020-07-13 | End: 2020-07-16

## 2020-07-13 RX ORDER — ONDANSETRON 2 MG/ML
4 INJECTION INTRAMUSCULAR; INTRAVENOUS ONCE
Status: COMPLETED | OUTPATIENT
Start: 2020-07-13 | End: 2020-07-13

## 2020-07-13 RX ORDER — MORPHINE SULFATE 2 MG/ML
2 INJECTION, SOLUTION INTRAMUSCULAR; INTRAVENOUS ONCE
Status: COMPLETED | OUTPATIENT
Start: 2020-07-13 | End: 2020-07-13

## 2020-07-13 RX ORDER — FENTANYL CITRATE 50 UG/ML
25 INJECTION, SOLUTION INTRAMUSCULAR; INTRAVENOUS ONCE
Status: COMPLETED | OUTPATIENT
Start: 2020-07-13 | End: 2020-07-13

## 2020-07-13 RX ADMIN — MORPHINE SULFATE 2 MG: 2 INJECTION, SOLUTION INTRAMUSCULAR; INTRAVENOUS at 19:40

## 2020-07-13 RX ADMIN — FENTANYL CITRATE 25 MCG: 50 INJECTION, SOLUTION INTRAMUSCULAR; INTRAVENOUS at 18:48

## 2020-07-13 RX ADMIN — ONDANSETRON 4 MG: 2 INJECTION INTRAMUSCULAR; INTRAVENOUS at 18:48

## 2020-07-13 ASSESSMENT — PAIN SCALES - GENERAL
PAINLEVEL_OUTOF10: 6
PAINLEVEL_OUTOF10: 9
PAINLEVEL_OUTOF10: 7
PAINLEVEL_OUTOF10: 7

## 2020-07-13 ASSESSMENT — PAIN DESCRIPTION - ORIENTATION: ORIENTATION: LEFT

## 2020-07-13 ASSESSMENT — PAIN DESCRIPTION - PAIN TYPE: TYPE: ACUTE PAIN

## 2020-07-13 ASSESSMENT — PAIN DESCRIPTION - DESCRIPTORS: DESCRIPTORS: ACHING

## 2020-07-13 ASSESSMENT — PAIN DESCRIPTION - LOCATION: LOCATION: SHOULDER

## 2020-07-13 ASSESSMENT — PAIN DESCRIPTION - FREQUENCY: FREQUENCY: CONTINUOUS

## 2020-07-13 NOTE — ED NOTES
Patient to ED via EMS from [facility]. Staff reports isac was getting out of chair and fell landing on left shoulder. Patient in severe pain when moving shoulder. Patient has altered mental status from hx of dementia.  Patient calm and cooperative at this time     Jessica Woods RN  07/13/20 2709

## 2020-07-13 NOTE — ED PROVIDER NOTES
[facility] [address] [address] EMERGENCY DEPT  EMERGENCY DEPARTMENT ENCOUNTER      Pt Name: Michael Iverson  MRN: [id number]  Milady 5/25/1930  Date of evaluation: 7/13/2020  Provider: Porter MD    [address] [place] [address]       Chief Complaint   Patient presents with   [facility] Shoulder Injury     left         HISTORY OF PRESENT ILLNESS   (Location/Symptom, Timing/Onset, Context/Setting, Quality, Duration, Modifying Factors, Severity)  Note limiting factors. Patient is a 25-year-old female with history of arthritis who presents for evaluation of left shoulder injury after a fall. Patient states that she was trying to get out of the chair and she fell directly onto her left side. He did not hit her head or lose consciousness. Patient states that it is painful to move her left shoulder. Patient notes the injury happened at approximately 5 PM today. Patient currently stays at [address] [place] [address]. Patient is not on anticoagulation. He has not received any pain medicine prior to arrival in the ER. She denies any pain in her hips, legs, head. Nursing Notes were reviewed. REVIEW OF SYSTEMS    (2-9 systems for level 4, 10 or more for level 5)     Review of Systems   All other systems reviewed and are negative. Except as noted above the remainder of the review of systems was reviewed and negative.        PAST MEDICAL HISTORY     Past Medical History:   Diagnosis Date    Arthritis     Cancer ([address]     skin    Colitis     Colon polyps     Fatty liver     resolved per patient    Hearing loss of both ears     Hyperlipidemia     Varicose vein of leg     Varicosity          SURGICAL HISTORY       Past Surgical History:   Procedure Laterality Date    BLADDER SUSPENSION  1968    FOOT SURGERY Right 1970s    HYSTERECTOMY  1968    JOINT REPLACEMENT Left     left knee    JOINT REPLACEMENT Right     hip    MOHS SURGERY  1/22/14    NOSE    UT OFFICE/OUTPT VISIT,PROCEDURE ONLY N/A 9/19/2018    MOHS DEFECT REPAIR BCC DORSUM OF NOSE performed by Clara Leong MD at [facility] [address]  2008    small blockage-adhesion         CURRENT MEDICATIONS       Discharge Medication List as of 7/13/2020  8:59 PM      CONTINUE these medications which have NOT CHANGED    Details   traZODone (DESYREL) 50 MG tablet Take 0.5 tablets by mouth nightly as needed for Sleep, Disp-15 tablet, R-5Normal      mirabegron (MYRBETRIQ) 50 MG TB24 Take 50 mg by mouth daily, Disp-30 tablet, R-3Normal      donepezil (ARICEPT) 5 MG tablet take 1 tablet by mouth ONCE NIGHTLY., Disp-90 tablet, R-3Normal      Psyllium (METAMUCIL) 28.3 % POWD 1 tablespoon in 8 oz water daily by mouth, Disp-1365 g, R-3Normal             ALLERGIES     Sulfa antibiotics    FAMILY HISTORY       Family History   Problem Relation Age of Onset    Arthritis Mother     Arthritis Father     Cancer Father         lung    Stroke Father     Diabetes Sister           SOCIAL HISTORY       Social History     Socioeconomic History    Marital status:       Spouse name: None    Number of children: None    Years of education: None    Highest education level: None   Occupational History    None   Social Needs    Financial resource strain: None    Food insecurity     Worry: None     Inability: None    Transportation needs     Medical: None     Non-medical: None   Tobacco Use    Smoking status: Never Smoker    Smokeless tobacco: Never Used   Substance and Sexual Activity    Alcohol use: Yes     Comment: rare    Drug use: No    Sexual activity: Not Currently   Lifestyle    Physical activity     Days per week: None     Minutes per session: None    Stress: None   Relationships    Social connections     Talks on phone: None     Gets together: None     Attends Alevism service: None     Active member of club or organization: None     Attends meetings of clubs or organizations: None     Relationship status: None    Intimate partner violence     Fear of current or ex partner: None     Emotionally abused: None     Physically abused: None     Forced sexual activity: None   Other Topics Concern    None   Social History Narrative    None       SCREENINGS    August Coma Scale  Eye Opening: Spontaneous  Best Verbal Response: Oriented  Best Motor Response: Obeys commands  August Coma Scale Score: 15           PHYSICAL EXAM    (up to 7 for level 4, 8 or more for level 5)     ED Triage Vitals [07/13/20 1758]   BP Temp Temp Source Pulse Resp SpO2 Height Weight   (!) 156/77 97.7 °F (36.5 °C) Oral 64 20 100 % 5' 5\" (1.651 m) 120 lb (54.4 kg)       Physical Exam  Vitals signs and nursing note reviewed. Constitutional:       General: She is not in acute distress. Appearance: She is well-developed. She is not diaphoretic. HENT:      Head: Normocephalic. Mouth/Throat:      Pharynx: No oropharyngeal exudate. Eyes:      General:         Right eye: No discharge. Left eye: No discharge. Pupils: Pupils are equal, round, and reactive to light. Neck:      Musculoskeletal: Neck supple. Trachea: No tracheal deviation. Cardiovascular:      Rate and Rhythm: Normal rate and regular rhythm. Pulses:           Radial pulses are 2+ on the right side and 2+ on the left side. Dorsalis pedis pulses are 2+ on the right side and 2+ on the left side. Heart sounds: Normal heart sounds. No murmur. Pulmonary:      Effort: Pulmonary effort is normal. No respiratory distress. Breath sounds: Normal breath sounds. No stridor. No wheezing or rales. Abdominal:      General: Bowel sounds are normal. There is no distension. Palpations: Abdomen is soft. Tenderness: There is no abdominal tenderness. There is no guarding or rebound. Musculoskeletal:      Left shoulder: She exhibits tenderness (distal clavicle) and bony tenderness.       Left elbow: Normal.      Left wrist: Normal.      Right hip: Normal.      Left hip: Normal.      Right knee: Normal.      Left knee: Normal.      Right lower leg: No edema. Left lower leg: No edema. Comments: There is tenderness to the distal left clavicle. There is no tenting of the skin. There is some swelling at the distal left clavicle. Skin:     General: Skin is warm and dry. Capillary Refill: Capillary refill takes less than 2 seconds. Findings: No erythema or rash. Neurological:      Mental Status: She is alert and oriented to person, place, and time. Cranial Nerves: No cranial nerve deficit. Sensory: No sensory deficit. Motor: No abnormal muscle tone. Coordination: Coordination normal.   Psychiatric:         Behavior: Behavior normal.         Thought Content: Thought content normal.         Judgment: Judgment normal.         DIAGNOSTIC RESULTS     EKG: All EKG's are interpreted by the Emergency Department Physician who either signs or Co-signs this chart in the absence of a cardiologist.    RADIOLOGY:   Non-plain film images such as CT, Ultrasound and MRI are read by the radiologist. Plain radiographic images are visualized and preliminarily interpreted by the emergency physician with the below findings:      Interpretation per the Radiologist below, if available at the time of this note:    XR SHOULDER LEFT (MIN 2 VIEWS)   Final Result    IMPRESSION:   Acute distal left clavicular fracture. **This report has been created using voice recognition software. It may contain minor errors which are inherent in voice recognition technology. **      Final report electronically signed by Dr. Imani Ryan on 7/13/2020 7:19 PM            ED BEDSIDE ULTRASOUND:   Performed by ED Physician - none    LABS:  Labs Reviewed   BASIC METABOLIC PANEL - Abnormal; Notable for the following components:       Result Value    CO2 21 (*)     All other components within normal limits   CBC WITH AUTO DIFFERENTIAL - Abnormal; Notable for the following components: MEDICATIONS:  Discharge Medication List as of 7/13/2020  8:59 PM      START taking these medications    Details   HYDROcodone-acetaminophen (NORCO) 5-325 MG per tablet Take 1 tablet by mouth every 6 hours as needed for Pain for up to 3 days. Intended supply: 3 days. Take lowest dose possible to manage pain, Disp-12 tablet,R-0Print           Controlled Substances Monitoring:     RX Monitoring 1/26/2017   Attestation The Prescription Monitoring Report for this patient was reviewed today. Periodic Controlled Substance Monitoring No signs of potential drug abuse or diversion identified.        (Please note that portions of this note were completed with a voice recognition program.  Efforts were made to edit the dictations but occasionally words are mis-transcribed.)    Farris MD (electronically signed)  Attending Emergency Physician           Aung Jacques MD  07/14/20 5686

## 2020-07-14 NOTE — ED NOTES
Pt family updated on plan for ambulance. [facility] applied. Denies needs, VSS. Daughter requesting a script to take pain meds home. Dr Tiny Adamson to be notified.  Call light in place     Dioni Patterson  07/13/20 2052

## 2020-07-27 ENCOUNTER — TELEPHONE (OUTPATIENT)
Dept: FAMILY MEDICINE CLINIC | Age: 85
End: 2020-07-27

## 2020-07-27 ENCOUNTER — OFFICE VISIT (OUTPATIENT)
Dept: FAMILY MEDICINE CLINIC | Age: 85
End: 2020-07-27
Payer: MEDICARE

## 2020-07-27 VITALS
OXYGEN SATURATION: 96 % | HEART RATE: 101 BPM | TEMPERATURE: 97.2 F | WEIGHT: 154.6 LBS | HEIGHT: 61 IN | DIASTOLIC BLOOD PRESSURE: 61 MMHG | SYSTOLIC BLOOD PRESSURE: 98 MMHG | BODY MASS INDEX: 29.19 KG/M2 | RESPIRATION RATE: 16 BRPM

## 2020-07-27 PROCEDURE — G8417 CALC BMI ABV UP PARAM F/U: HCPCS | Performed by: FAMILY MEDICINE

## 2020-07-27 PROCEDURE — 99213 OFFICE O/P EST LOW 20 MIN: CPT | Performed by: FAMILY MEDICINE

## 2020-07-27 PROCEDURE — 1036F TOBACCO NON-USER: CPT | Performed by: FAMILY MEDICINE

## 2020-07-27 PROCEDURE — 4040F PNEUMOC VAC/ADMIN/RCVD: CPT | Performed by: FAMILY MEDICINE

## 2020-07-27 PROCEDURE — G8427 DOCREV CUR MEDS BY ELIG CLIN: HCPCS | Performed by: FAMILY MEDICINE

## 2020-07-27 PROCEDURE — 1090F PRES/ABSN URINE INCON ASSESS: CPT | Performed by: FAMILY MEDICINE

## 2020-07-27 PROCEDURE — 1123F ACP DISCUSS/DSCN MKR DOCD: CPT | Performed by: FAMILY MEDICINE

## 2020-07-27 RX ORDER — M-VIT,TX,IRON,MINS/CALC/FOLIC 27MG-0.4MG
1 TABLET ORAL DAILY
Qty: 30 TABLET | Refills: 11 | Status: SHIPPED | OUTPATIENT
Start: 2020-07-27 | End: 2021-07-27

## 2020-07-27 RX ORDER — ACETAMINOPHEN 325 MG/1
650 TABLET ORAL EVERY 6 HOURS PRN
COMMUNITY

## 2020-07-27 NOTE — PROGRESS NOTES
Josafat Salazar is a 80 y.o. female that presents for     Chief Complaint   Patient presents with    Follow-up     1 month f/u- sleeping better, still urinating q2hr   Lucero Craze Fall     last week fell and broke left clavicle- wears arm brace       BP 98/61   Pulse 101   Temp 97.2 °F (36.2 °C) (Temporal)   Resp 16   Ht 5' 1\" (1.549 m)   Wt 154 lb 9.6 oz (70.1 kg)   SpO2 96%   BMI 29.21 kg/m²       HPI:    1 month follow up for insomnia and urinary frequency. Started on trazodone for insomnia. States that she notices 'some difference'. Says that she gets up at least one time at night to urinate. No daytime fatigue. Denies SEs from the trazodone. She is seeing Dr Aury Hartley for her OAB. They have discussed botox and they are discussing this.     Health Maintenance   Topic Date Due    DTaP/Tdap/Td vaccine (1 - Tdap) 05/25/1949    Shingles Vaccine (1 of 2) 05/25/1980    Flu vaccine (1) 09/01/2020    Annual Wellness Visit (AWV)  01/29/2021    Pneumococcal 65+ years Vaccine  Completed    Hepatitis A vaccine  Aged Out    Hepatitis B vaccine  Aged Out    Hib vaccine  Aged Out    Meningococcal (ACWY) vaccine  Aged Out       Past Medical History:   Diagnosis Date    Arthritis     Cancer ([facility] [address]     skin    Colitis     Colon polyps     Fatty liver     resolved per patient    Hearing loss of both ears     Hyperlipidemia     Varicose vein of leg     Varicosity        Past Surgical History:   Procedure Laterality Date    BLADDER SUSPENSION  1968    FOOT SURGERY Right 1970s    HYSTERECTOMY  1968    JOINT REPLACEMENT Left     left knee    JOINT REPLACEMENT Right     hip    MOHS SURGERY  1/22/14    NOSE    HI OFFICE/OUTPT VISIT,PROCEDURE ONLY N/A 9/19/2018    MOHS DEFECT REPAIR BCC DORSUM OF NOSE performed by Sandre Boast, MD at [address] [facility] [address]  2008    small blockage-adhesion       Social History     Tobacco Use    Smoking status: Never Smoker    Smokeless tobacco: Never Used   Substance Use Topics    Alcohol use: Yes     Comment: rare    Drug use: No       Family History   Problem Relation Age of Onset    Arthritis Mother     Arthritis Father     Cancer Father         lung    Stroke Father     Diabetes Sister          I have reviewed the patient's past medical history, past surgical history, allergies, medications, social and family history and I have made updates where appropriate. Review of Systems        PHYSICAL EXAM:  BP 98/61   Pulse 101   Temp 97.2 °F (36.2 °C) (Temporal)   Resp 16   Ht 5' 1\" (1.549 m)   Wt 154 lb 9.6 oz (70.1 kg)   SpO2 96%   BMI 29.21 kg/m²     General Appearance: well developed and well- nourished, in no acute distress  Head: normocephalic and atraumatic  ENT: external ear and ear canal normal bilaterally, nose without deformity, nasal mucosa and turbinates normal without polyps, oropharynx normal, dentition is normal for age, no lipor gum lesions noted  Neck: supple and non-tender without mass, no thyromegaly or thyroid nodules, no cervical lymphadenopathy  Pulmonary/Chest: clear to auscultation bilaterally- no wheezes, rales or rhonchi, normal air movement, no respiratory distress or retractions  Cardiovascular: normal rate, regular rhythm, normal S1 and S2, no murmurs, rubs, clicks, or gallops, distal pulses intact  Extremities: no cyanosis, clubbing or edema of the lower extremities  Psych:  Normal affect without evidence of depression oranxiety, insight and judgement are appropriate, memory appears intact  Skin: warm and dry, no rash or erythema      ASSESSMENT & PLAN  Alistair Wiley was seen today for follow-up and fall. Diagnoses and all orders for this visit:    Other insomnia    Closed nondisplaced fracture of acromial end of left clavicle, sequela  -     calcium carbonate-vitamin D (CALCIUM 600 + D) 600-400 MG-UNIT TABS per tab;  Take 1 tablet by mouth 2 times daily  -     Multiple Vitamins-Minerals (THERAPEUTIC MULTIVITAMIN-MINERALS) tablet; Take 1 tablet by mouth daily  -     Misc. Devices (Boris Lee) MISC; Wheeled walker with seat, use as directed    Urinary frequency    Generalized weakness  -     Misc. Devices (Boris Lee) MISC; Wheeled walker with seat, use as directed    -Insomnia better, continue trazodone  -Follow up with ortho and urology for other conditions    Patient requires the assistance of a wheeled walker to successfully complete daily living tasks such as: toileting, bathing, dressing, and grooming. A wheeled walker is necessary due to the patient's unsteady gait, upper body weakness, inability to  an ambulation device and can ambulate only by pushing a walker instead of a lesser assistive device such as a cane, crutch, or standard walker. Return in about 4 months (around 11/27/2020). Controlled Substance Monitoring:    Acute and Chronic Pain Monitoring:   RX Monitoring 1/26/2017   Attestation The Prescription Monitoring Report for this patient was reviewed today. Periodic Controlled Substance Monitoring No signs of potential drug abuse or diversion identified. Hao Quintero received counseling on the following healthy behaviors: medication adherence  Reviewed prior labs and health maintenance. Continue current medications, diet and exercise. Discussed use, benefit, and side effects of prescribed medications. Barriers to medication compliance addressed. Patient given educational materials - see patient instructions. All patient questions answered. Patient voiced understanding.

## 2020-07-27 NOTE — TELEPHONE ENCOUNTER
Pt's daughter called, Dania Dunham is needing an order for a new walker with wheels and a seat. The order will need to be faxed to [facility] at [phone]. Please fax insurance card with order. Please advise.

## 2020-07-28 NOTE — TELEPHONE ENCOUNTER
[facility]y [facility] calling stating that the order for the wheeled walker with seat is missing \"with seat\" documented on the order.     Please refax corrected order to [phone]    Please advise

## 2020-10-30 ENCOUNTER — OFFICE VISIT (OUTPATIENT)
Dept: FAMILY MEDICINE CLINIC | Age: 85
End: 2020-10-30
Payer: MEDICARE

## 2020-10-30 ENCOUNTER — TELEPHONE (OUTPATIENT)
Dept: FAMILY MEDICINE CLINIC | Age: 85
End: 2020-10-30

## 2020-10-30 VITALS
HEART RATE: 97 BPM | OXYGEN SATURATION: 98 % | BODY MASS INDEX: 28.96 KG/M2 | DIASTOLIC BLOOD PRESSURE: 72 MMHG | WEIGHT: 153.4 LBS | SYSTOLIC BLOOD PRESSURE: 122 MMHG | TEMPERATURE: 97.8 F | RESPIRATION RATE: 18 BRPM | HEIGHT: 61 IN

## 2020-10-30 PROCEDURE — 1123F ACP DISCUSS/DSCN MKR DOCD: CPT | Performed by: FAMILY MEDICINE

## 2020-10-30 PROCEDURE — 4040F PNEUMOC VAC/ADMIN/RCVD: CPT | Performed by: FAMILY MEDICINE

## 2020-10-30 PROCEDURE — 99213 OFFICE O/P EST LOW 20 MIN: CPT | Performed by: FAMILY MEDICINE

## 2020-10-30 PROCEDURE — G8484 FLU IMMUNIZE NO ADMIN: HCPCS | Performed by: FAMILY MEDICINE

## 2020-10-30 PROCEDURE — 1036F TOBACCO NON-USER: CPT | Performed by: FAMILY MEDICINE

## 2020-10-30 PROCEDURE — 1090F PRES/ABSN URINE INCON ASSESS: CPT | Performed by: FAMILY MEDICINE

## 2020-10-30 PROCEDURE — G8427 DOCREV CUR MEDS BY ELIG CLIN: HCPCS | Performed by: FAMILY MEDICINE

## 2020-10-30 PROCEDURE — G8417 CALC BMI ABV UP PARAM F/U: HCPCS | Performed by: FAMILY MEDICINE

## 2020-10-30 RX ORDER — CIPROFLOXACIN 250 MG/1
250 TABLET, FILM COATED ORAL 2 TIMES DAILY
Qty: 14 TABLET | Refills: 0 | Status: SHIPPED | OUTPATIENT
Start: 2020-10-30 | End: 2020-11-06

## 2020-10-30 NOTE — TELEPHONE ENCOUNTER
Pt's daughter, Delia Hoffman (listed on HIPAA)  asked at checkout today if it would be a possibility for them to get a handicap placard for the pt so when they take her to her various appts etc she won't have to walk as far? Delia Hoffman would like our ofc to contact her & she will pick the script up. Please advise.

## 2020-10-30 NOTE — PROGRESS NOTES
Afsaneh Orellana is a 80 y.o. female that presents for     Chief Complaint   Patient presents with    Other     bladder issue ,        /72   Pulse 97   Temp 97.8 °F (36.6 °C)   Resp 18   Ht 5' 1.25\" (1.556 m)   Wt 153 lb 6.4 oz (69.6 kg)   SpO2 98%   BMI 28.75 kg/m²       HPI:    Patient states that she continues to have urinary urgency. She is following with urology for this issue as well. Had botox injection about 2 months ago, no improvement following this. States that she is still waking up frequently to pee. No dysuria, hematuria.       Health Maintenance   Topic Date Due    DTaP/Tdap/Td vaccine (1 - Tdap) 05/25/1949    Shingles Vaccine (1 of 2) 05/25/1980    Flu vaccine (1) 10/30/2021 (Originally 9/1/2020)    Annual Wellness Visit (AWV)  01/29/2021    Pneumococcal 65+ years Vaccine  Completed    Hepatitis A vaccine  Aged Out    Hepatitis B vaccine  Aged Out    Hib vaccine  Aged Out    Meningococcal (ACWY) vaccine  Aged Out       Past Medical History:   Diagnosis Date    Arthritis     Cancer ([facility] [address]     skin    Colitis     Colon polyps     Fatty liver     resolved per patient    Hearing loss of both ears     Hyperlipidemia     Varicose vein of leg     Varicosity        Past Surgical History:   Procedure Laterality Date    BLADDER SUSPENSION  1968    FOOT SURGERY Right 1970s    HYSTERECTOMY  1968    JOINT REPLACEMENT Left     left knee    JOINT REPLACEMENT Right     hip    MOHS SURGERY  1/22/14    NOSE    SC OFFICE/OUTPT VISIT,PROCEDURE ONLY N/A 9/19/2018    MOHS DEFECT REPAIR BCC DORSUM OF NOSE performed by Katelyn Huitron MD at [address] [facility] [address]  2008    small blockage-adhesion       Social History     Tobacco Use    Smoking status: Never Smoker    Smokeless tobacco: Never Used   Substance Use Topics    Alcohol use: Yes     Comment: rare    Drug use: No       Family History   Problem Relation Age of Onset    Arthritis Mother    [facility] Arthritis Father     Cancer Father         lung    Stroke Father     Diabetes Sister          I have reviewed the patient's past medical history, past surgical history, allergies, medications, social and family history and I have made updates where appropriate. Review of Systems        PHYSICAL EXAM:  /72   Pulse 97   Temp 97.8 °F (36.6 °C)   Resp 18   Ht 5' 1.25\" (1.556 m)   Wt 153 lb 6.4 oz (69.6 kg)   SpO2 98%   BMI 28.75 kg/m²     General Appearance: well developed and well- nourished, in no acute distress  Head: normocephalic and atraumatic  ENT: external ear and ear canal normal bilaterally, nose without deformity, nasal mucosa and turbinates normal without polyps, oropharynx normal, dentition is normal for age, no lipor gum lesions noted  Neck: supple and non-tender without mass, no thyromegaly or thyroid nodules, no cervical lymphadenopathy  Pulmonary/Chest: clear to auscultation bilaterally- no wheezes, rales or rhonchi, normal air movement, no respiratory distress or retractions  Cardiovascular: normal rate, regular rhythm, normal S1 and S2, no murmurs, rubs, clicks, or gallops, distal pulses intact  Abdomen: soft, non-tender, non-distended, no rebound or guarding, no masses or hernias noted, no hepatospleenomegaly  Extremities: no cyanosis, clubbing or edema of the lower extremities  Psych:  Normal affect without evidence of depression oranxiety, insight and judgement are poor, memory appears poor  Skin: warm and dry, no rash or erythema      ASSESSMENT & PLAN  She Real was seen today for other. Diagnoses and all orders for this visit:    Urinary frequency  -     ciprofloxacin (CIPRO) 250 MG tablet; Take 1 tablet by mouth 2 times daily for 7 days    -Her daughter is concerned for a possible UTI, unlikely given length of sx, but patient has not been able to give a UA since sx started. Will treat for possible UTI and see if this makes a difference.   If no difference, will set up a sooner

## 2021-01-14 ENCOUNTER — TELEPHONE (OUTPATIENT)
Dept: UROLOGY | Age: 86
End: 2021-01-14

## 2021-01-14 NOTE — TELEPHONE ENCOUNTER
[facility]/o urgency and frequency. She denies burning, fever, or chills. Patient is a current patient of Dr Rhonda Lauren and will call that office.

## 2021-08-24 ENCOUNTER — OFFICE VISIT (OUTPATIENT)
Dept: FAMILY MEDICINE CLINIC | Age: 86
End: 2021-08-24
Payer: MEDICARE

## 2021-08-24 VITALS
TEMPERATURE: 97.7 F | WEIGHT: 161.6 LBS | RESPIRATION RATE: 18 BRPM | BODY MASS INDEX: 31.73 KG/M2 | SYSTOLIC BLOOD PRESSURE: 98 MMHG | HEIGHT: 60 IN | OXYGEN SATURATION: 96 % | DIASTOLIC BLOOD PRESSURE: 52 MMHG | HEART RATE: 78 BPM

## 2021-08-24 DIAGNOSIS — L03.115 CELLULITIS OF RIGHT LOWER EXTREMITY: Primary | ICD-10-CM

## 2021-08-24 DIAGNOSIS — R60.9 EDEMA, UNSPECIFIED TYPE: ICD-10-CM

## 2021-08-24 PROCEDURE — 1036F TOBACCO NON-USER: CPT | Performed by: NURSE PRACTITIONER

## 2021-08-24 PROCEDURE — G8417 CALC BMI ABV UP PARAM F/U: HCPCS | Performed by: NURSE PRACTITIONER

## 2021-08-24 PROCEDURE — 4040F PNEUMOC VAC/ADMIN/RCVD: CPT | Performed by: NURSE PRACTITIONER

## 2021-08-24 PROCEDURE — G8427 DOCREV CUR MEDS BY ELIG CLIN: HCPCS | Performed by: NURSE PRACTITIONER

## 2021-08-24 PROCEDURE — 1090F PRES/ABSN URINE INCON ASSESS: CPT | Performed by: NURSE PRACTITIONER

## 2021-08-24 PROCEDURE — 99214 OFFICE O/P EST MOD 30 MIN: CPT | Performed by: NURSE PRACTITIONER

## 2021-08-24 PROCEDURE — 1123F ACP DISCUSS/DSCN MKR DOCD: CPT | Performed by: NURSE PRACTITIONER

## 2021-08-24 RX ORDER — DOXYCYCLINE HYCLATE 100 MG
100 TABLET ORAL 2 TIMES DAILY
Qty: 10 TABLET | Refills: 0 | Status: SHIPPED | OUTPATIENT
Start: 2021-08-24 | End: 2021-08-29

## 2021-08-24 RX ORDER — COMPRESS.STOCKING,KNEE,REG,MED
EACH MISCELLANEOUS
Qty: 1 EACH | Refills: 1 | Status: SHIPPED | OUTPATIENT
Start: 2021-08-24

## 2021-08-24 SDOH — ECONOMIC STABILITY: FOOD INSECURITY: WITHIN THE PAST 12 MONTHS, THE FOOD YOU BOUGHT JUST DIDN'T LAST AND YOU DIDN'T HAVE MONEY TO GET MORE.: NEVER TRUE

## 2021-08-24 SDOH — ECONOMIC STABILITY: FOOD INSECURITY: WITHIN THE PAST 12 MONTHS, YOU WORRIED THAT YOUR FOOD WOULD RUN OUT BEFORE YOU GOT MONEY TO BUY MORE.: NEVER TRUE

## 2021-08-24 ASSESSMENT — PATIENT HEALTH QUESTIONNAIRE - PHQ9
SUM OF ALL RESPONSES TO PHQ QUESTIONS 1-9: 0
2. FEELING DOWN, DEPRESSED OR HOPELESS: 0
SUM OF ALL RESPONSES TO PHQ QUESTIONS 1-9: 0
SUM OF ALL RESPONSES TO PHQ QUESTIONS 1-9: 0
1. LITTLE INTEREST OR PLEASURE IN DOING THINGS: 0
SUM OF ALL RESPONSES TO PHQ9 QUESTIONS 1 & 2: 0

## 2021-08-24 ASSESSMENT — SOCIAL DETERMINANTS OF HEALTH (SDOH): HOW HARD IS IT FOR YOU TO PAY FOR THE VERY BASICS LIKE FOOD, HOUSING, MEDICAL CARE, AND HEATING?: NOT HARD AT ALL

## 2021-08-24 NOTE — PATIENT INSTRUCTIONS
Patient Education        Cellulitis: Care Instructions  Your Care Instructions     Cellulitis is a skin infection caused by bacteria, most often strep or staph. It often occurs after a break in the skin from a scrape, cut, bite, or puncture, or after a rash. Cellulitis may be treated without doing tests to find out what caused it. But your doctor may do tests, if needed, to look for a specific bacteria, like methicillin-resistant Staphylococcus aureus (MRSA). The doctor has checked you carefully, but problems can develop later. If you notice any problems or new symptoms, get medical treatment right away. Follow-up care is a key part of your treatment and safety. Be sure to make and go to all appointments, and call your doctor if you are having problems. It's also a good idea to know your test results and keep a list of the medicines you take. How can you care for yourself at home? · Take your antibiotics as directed. Do not stop taking them just because you feel better. You need to take the full course of antibiotics. · Prop up the infected area on pillows to reduce pain and swelling. Try to keep the area above the level of your heart as often as you can. · If your doctor told you how to care for your wound, follow your doctor's instructions. If you did not get instructions, follow this general advice:  ? Wash the wound with clean water 2 times a day. Don't use hydrogen peroxide or alcohol, which can slow healing. ? You may cover the wound with a thin layer of petroleum jelly, such as Vaseline, and a nonstick bandage. ? Apply more petroleum jelly and replace the bandage as needed. · Be safe with medicines. Take pain medicines exactly as directed. ? If the doctor gave you a prescription medicine for pain, take it as prescribed. ? If you are not taking a prescription pain medicine, ask your doctor if you can take an over-the-counter medicine.   To prevent cellulitis in the future  · Try to prevent cuts, scrapes, or other injuries to your skin. Cellulitis most often occurs where there is a break in the skin. · If you get a scrape, cut, mild burn, or bite, wash the wound with clean water as soon as you can to help avoid infection. Don't use hydrogen peroxide or alcohol, which can slow healing. · If you have swelling in your legs (edema), support stockings and good skin care may help prevent leg sores and cellulitis. · Take care of your feet, especially if you have diabetes or other conditions that increase the risk of infection. Wear shoes and socks. Do not go barefoot. If you have athlete's foot or other skin problems on your feet, talk to your doctor about how to treat them. When should you call for help? Call your doctor now or seek immediate medical care if:    · You have signs that your infection is getting worse, such as:  ? Increased pain, swelling, warmth, or redness. ? Red streaks leading from the area. ? Pus draining from the area. ? A fever.     · You get a rash. Watch closely for changes in your health, and be sure to contact your doctor if:    · You do not get better as expected. Where can you learn more? Go to https://Navdy.Freightos. org and sign in to your FashionQlub account. Enter T787 in the [facility] box to learn more about \"Cellulitis: Care Instructions. \"     If you do not have an account, please click on the \"Sign Up Now\" link. Current as of: March 3, 2021               Content Version: 12.9  © 2006-2021 Healthwise, Incorporated. Care instructions adapted under license by [facility] ([facility]). If you have questions about a medical condition or this instruction, always ask your healthcare professional. [place] [address] any warranty or liability for your use of this information. Patient Education        Leg and Ankle Edema: Care Instructions  Your Care Instructions  Swelling in the legs, ankles, and feet is called edema.  It is common after you sit or stand for a while. Long plane flights or car rides often cause swelling in the legs and feet. You may also have swelling if you have to stand for long periods of time at your job. Problems with the veins in the legs (varicose veins) and changes in hormones can also cause swelling. Sometimes the swelling in the ankles and feet is caused by a more serious problem, such as heart failure, infection, blood clots, or liver or kidney disease. Follow-up care is a key part of your treatment and safety. Be sure to make and go to all appointments, and call your doctor if you are having problems. It's also a good idea to know your test results and keep a list of the medicines you take. How can you care for yourself at home? · If your doctor gave you medicine, take it as prescribed. Call your doctor if you think you are having a problem with your medicine. · Whenever you are resting, raise your legs up. Try to keep the swollen area higher than the level of your heart. · Take breaks from standing or sitting in one position. ? Walk around to increase the blood flow in your lower legs. ? Move your feet and ankles often while you stand, or tighten and relax your leg muscles. · Wear support stockings. Put them on in the morning, before swelling gets worse. · Eat a balanced diet. Lose weight if you need to. · Limit the amount of salt (sodium) in your diet. Salt holds fluid in the body and may increase swelling. When should you call for help? Call 911 anytime you think you may need emergency care. For example, call if:    · You have symptoms of a blood clot in your lung (called a pulmonary embolism). These may include:  ? Sudden chest pain. ? Trouble breathing. ? Coughing up blood. Call your doctor now or seek immediate medical care if:    · You have signs of a blood clot, such as:  ? Pain in your calf, back of the knee, thigh, or groin. ?  Redness and swelling in your leg or groin.     · You have symptoms of infection, such as:  ? Increased pain, swelling, warmth, or redness. ? Red streaks or pus. ? A fever. Watch closely for changes in your health, and be sure to contact your doctor if:    · Your swelling is getting worse.     · You have new or worsening pain in your legs.     · You do not get better as expected. Where can you learn more? Go to https://ZenRoboticspepicForge Medical.CarePoint Solutions. org and sign in to your Sverhmarket account. Enter D633 in the T-VIPS box to learn more about \"Leg and Ankle Edema: Care Instructions. \"     If you do not have an account, please click on the \"Sign Up Now\" link. Current as of: October 19, 2020               Content Version: 12.9  © 2006-2021 Healthwise, Incorporated. Care instructions adapted under license by [facility] ([facility]). If you have questions about a medical condition or this instruction, always ask your healthcare professional. [place] [address] any warranty or liability for your use of this information. I personally saw the patient with the PA, and completed the key components of the history and physical exam. I then discussed the management plan with the PA.   gen in nad resp clear cardiac no murmur  heeent PERR no apd nml acuity no hyphema agree with pa assessment

## 2021-08-24 NOTE — PROGRESS NOTES
SUBJECTIVE:  Matt Fajardo is a 80 y.o. y/o female that presents with Edema (right leg ankle red painful for 3 weeks)  . HPI:  Pain is present in the RLE. Symptoms have been present for 3 week(s). The pain is constant, mild. The patient describes the pain as aching. Inciting injury or history of trauma? No  Pain is aggravated by - touching  Pain is relieved by - nothing  Radiation of the pain? No  Paresthesias of the extremities? No  Decreased ROM? No  Edema? Yes  Treatments tried - Sammy hose and rest        OBJECTIVE:  BP (!) 98/52   Pulse 78   Temp 97.7 °F (36.5 °C) (Oral)   Resp 18   Ht 5' (1.524 m)   Wt 161 lb 9.6 oz (73.3 kg)   SpO2 96%   BMI 31.56 kg/m²   General appearance: alert, well appearing, and in no distress. RLE reddened and edematous, PP E&S bilat, good cap refill, full ROM        ASSESSMENT & PLAN  Georgean Kocher was seen today for edema. Diagnoses and all orders for this visit:    Cellulitis of right lower extremity  -     doxycycline hyclate (VIBRA-TABS) 100 MG tablet; Take 1 tablet by mouth 2 times daily for 5 days  -     Elastic Bandages & Supports (T.E.D. ANTI-EMBOLISM STOCKINGS) MISC; Knee high SAMMY hose. Please measure pt for appropriate size. Wear during daytime, take off at night. Edema, unspecified type  -     Elastic Bandages & Supports (T.E.D. ANTI-EMBOLISM STOCKINGS) MISC; Knee high SAMMY hose. Please measure pt for appropriate size. Wear during daytime, take off at night. Walk in Friday am or Monday for recheck    No follow-ups on file.     Electronically signed by DHEERAJ Hammer CNP on 8/24/2021 at 9:18 AM    -Presentation is consistent with cellulitis  -Start above treatments  -Patient advised to contact our office immediately if symptoms worsen or persist  -Patient counseled on conservative care including activity modification and OTC meds

## 2022-04-14 ENCOUNTER — OFFICE VISIT (OUTPATIENT)
Dept: FAMILY MEDICINE CLINIC | Age: 87
End: 2022-04-14
Payer: MEDICARE

## 2022-04-14 VITALS
DIASTOLIC BLOOD PRESSURE: 58 MMHG | HEART RATE: 82 BPM | RESPIRATION RATE: 16 BRPM | SYSTOLIC BLOOD PRESSURE: 100 MMHG | TEMPERATURE: 97.1 F | HEIGHT: 60 IN | BODY MASS INDEX: 31.69 KG/M2 | OXYGEN SATURATION: 98 % | WEIGHT: 161.4 LBS

## 2022-04-14 DIAGNOSIS — Z91.81 AT HIGH RISK FOR FALLS: Primary | ICD-10-CM

## 2022-04-14 DIAGNOSIS — L03.115 CELLULITIS OF RIGHT LOWER EXTREMITY: ICD-10-CM

## 2022-04-14 DIAGNOSIS — S81.811A LACERATION OF RIGHT LOWER LEG, INITIAL ENCOUNTER: ICD-10-CM

## 2022-04-14 PROCEDURE — 1090F PRES/ABSN URINE INCON ASSESS: CPT | Performed by: NURSE PRACTITIONER

## 2022-04-14 PROCEDURE — 99214 OFFICE O/P EST MOD 30 MIN: CPT | Performed by: NURSE PRACTITIONER

## 2022-04-14 PROCEDURE — 1036F TOBACCO NON-USER: CPT | Performed by: NURSE PRACTITIONER

## 2022-04-14 PROCEDURE — G8417 CALC BMI ABV UP PARAM F/U: HCPCS | Performed by: NURSE PRACTITIONER

## 2022-04-14 PROCEDURE — 4040F PNEUMOC VAC/ADMIN/RCVD: CPT | Performed by: NURSE PRACTITIONER

## 2022-04-14 PROCEDURE — 1123F ACP DISCUSS/DSCN MKR DOCD: CPT | Performed by: NURSE PRACTITIONER

## 2022-04-14 PROCEDURE — G8427 DOCREV CUR MEDS BY ELIG CLIN: HCPCS | Performed by: NURSE PRACTITIONER

## 2022-04-14 RX ORDER — TRIAMCINOLONE ACETONIDE 0.25 MG/G
CREAM TOPICAL
COMMUNITY
Start: 2022-03-09

## 2022-04-14 RX ORDER — CEPHALEXIN 500 MG/1
CAPSULE ORAL
COMMUNITY
Start: 2022-03-28 | End: 2022-04-14 | Stop reason: ALTCHOICE

## 2022-04-14 RX ORDER — DOXYCYCLINE HYCLATE 100 MG
100 TABLET ORAL 2 TIMES DAILY
Qty: 20 TABLET | Refills: 0 | Status: SHIPPED | OUTPATIENT
Start: 2022-04-14 | End: 2022-04-24

## 2022-04-14 ASSESSMENT — ENCOUNTER SYMPTOMS
COLOR CHANGE: 1
SHORTNESS OF BREATH: 0

## 2022-04-14 NOTE — PROGRESS NOTES
Mandie Riojas is a 80 y.o. female thatpresents for Wound Check (non healing)      History obtained today from Patient. HPI       Pt reports possibly injuring right lower extremity on something a few days ago, unsure of what caused it. Was bleeding when initially occurred. Now right leg with erythema and edema. Mild tenderness to injured area. Assisted living staff not present for history details. No chest pain or shortness of breath. She denies any antibiotic use recently, but med list notes Keflex was prescribed 3/28/2022. I have reviewed the patient's past medical history, past surgical history, allergies,medications, social and family history and I have made updates where appropriate. Past Medical History:   Diagnosis Date    Arthritis     Cancer ([facility] [address]     skin    Colitis     Colon polyps     Fatty liver     resolved per patient    Hearing loss of both ears     Hyperlipidemia     Varicose vein of leg     Varicosity        Social History     Tobacco Use    Smoking status: Never Smoker    Smokeless tobacco: Never Used   Substance Use Topics    Alcohol use: Yes     Comment: rare    Drug use: No       Family History   Problem Relation Age of Onset    Arthritis Mother     Arthritis Father     Cancer Father         lung    Stroke Father     Diabetes Sister          Review of Systems   Constitutional: Negative for fever. Respiratory: Negative for shortness of breath. Cardiovascular: Negative for chest pain. Musculoskeletal: Positive for arthralgias and gait problem. Skin: Positive for color change. PHYSICAL EXAM:  BP (!) 100/58   Pulse 82   Temp 97.1 °F (36.2 °C) (Infrared)   Resp 16   Ht 5' (1.524 m)   Wt 161 lb 6.4 oz (73.2 kg)   SpO2 98%   BMI 31.52 kg/m²     Physical Exam  Constitutional:       Appearance: Normal appearance.    HENT:      Mouth/Throat:      Mouth: Mucous membranes are moist.   Cardiovascular:      Rate and Rhythm: Normal rate and regular rhythm. Heart sounds: Murmur heard. Pulmonary:      Effort: Pulmonary effort is normal.      Breath sounds: Normal breath sounds. Abdominal:      General: Abdomen is flat. Musculoskeletal:         General: Swelling present. Right lower leg: Edema present. Skin:     General: Skin is warm and dry. Findings: Laceration present. Comments: Right lower extremity laceration, no drainage noted. Erythema, +1 edema. Laceration is approximately 1.5x. 15 cm in size. Edges with minimal amount of yellow slough. Purple in color in middle of wound. Neurological:      Mental Status: She is alert. Psychiatric:         Mood and Affect: Mood normal.         Thought Content: Thought content normal.         Judgment: Judgment normal.           ASSESSMENT & PLAN  Ruba Lucia was seen today for wound check. Diagnoses and all orders for this visit:    At high risk for falls    Cellulitis of right lower extremity  -     doxycycline hyclate (VIBRA-TABS) 100 MG tablet; Take 1 tablet by mouth 2 times daily for 10 days    Laceration of right lower leg, initial encounter  -     doxycycline hyclate (VIBRA-TABS) 100 MG tablet; Take 1 tablet by mouth 2 times daily for 10 days      FU Monday to re-eval wound. No follow-ups on file. No red flag sx, Start above treatments and Follow up with our practice if no improvement or worsening sx. All copied or forwarded information in the progress note was verified by me to be accurate at the time of visit  Patient's past medical, surgical, social and family history were reviewed and updated     All patient questions answered. Patient voiced understanding. On the basis of positive falls risk screening, assessment and plan is as follows: home safety tips provided.

## 2022-04-14 NOTE — PATIENT INSTRUCTIONS
Change dressing daily and as needed    Triple antibiotic ointment, then petroleum dressing and 4x4, wrapped with Kerlix. Keep legs elevated when possible.  4- for wound check. Patient Education        Cellulitis: Care Instructions  Your Care Instructions     Cellulitis is a skin infection caused by bacteria, most often strep or staph. It often occurs after a break in the skin from a scrape, cut, bite, orpuncture, or after a rash. Cellulitis may be treated without doing tests to find out what caused it. But your doctor may do tests, if needed, to look for a specific bacteria, like methicillin-resistant Staphylococcus aureus (MRSA). The doctor has checked you carefully, but problems can develop later. If you notice any problems or new symptoms, get medical treatment right away. Follow-up care is a key part of your treatment and safety. Be sure to make and go to all appointments, and call your doctor if you are having problems. It's also a good idea to know your test results and keep alist of the medicines you take. How can you care for yourself at home?  Take your antibiotics as directed. Do not stop taking them just because you feel better. You need to take the full course of antibiotics.  Prop up the infected area on pillows to reduce pain and swelling. Try to keep the area above the level of your heart as often as you can.  If your doctor told you how to care for your wound, follow your doctor's instructions. If you did not get instructions, follow this general advice:  ? Wash the wound with clean water 2 times a day. Don't use hydrogen peroxide or alcohol, which can slow healing. ? You may cover the wound with a thin layer of petroleum jelly, such as Vaseline, and a nonstick bandage. ? Apply more petroleum jelly and replace the bandage as needed.  Be safe with medicines. Take pain medicines exactly as directed.   ? If the doctor gave you a prescription medicine for pain, take it as prescribed. ? If you are not taking a prescription pain medicine, ask your doctor if you can take an over-the-counter medicine. To prevent cellulitis in the future   Try to prevent cuts, scrapes, or other injuries to your skin. Cellulitis most often occurs where there is a break in the skin.  If you get a scrape, cut, mild burn, or bite, wash the wound with clean water as soon as you can to help avoid infection. Don't use hydrogen peroxide or alcohol, which can slow healing.  If you have swelling in your legs (edema), support stockings and good skin care may help prevent leg sores and cellulitis.  Take care of your feet, especially if you have diabetes or other conditions that increase the risk of infection. Wear shoes and socks. Do not go barefoot. If you have athlete's foot or other skin problems on your feet, talk to your doctor about how to treat them. When should you call for help? Call your doctor now or seek immediate medical care if:     You have signs that your infection is getting worse, such as:  ? Increased pain, swelling, warmth, or redness. ? Red streaks leading from the area. ? Pus draining from the area. ? A fever.      You get a rash. Watch closely for changes in your health, and be sure to contact your doctor if:     You do not get better as expected. Where can you learn more? Go to https://[email or web address].[email or web address]. org and sign in to your timeplazza account. Enter V170 in the [facility] box to learn more about \"Cellulitis: Care Instructions. \"     If you do not have an account, please click on the \"Sign Up Now\" link. Current as of: November 15, 2021               Content Version: 13.2  © 0882-1362 [employer]. Care instructions adapted under license by [facility] ([facility]).  If you have questions about a medical condition or this instruction, always ask your healthcare professional. [address]  any warranty or liability for your use of this information.

## 2022-04-28 ENCOUNTER — OFFICE VISIT (OUTPATIENT)
Dept: FAMILY MEDICINE CLINIC | Age: 87
End: 2022-04-28
Payer: MEDICARE

## 2022-04-28 VITALS
TEMPERATURE: 96.7 F | SYSTOLIC BLOOD PRESSURE: 108 MMHG | DIASTOLIC BLOOD PRESSURE: 50 MMHG | WEIGHT: 158.6 LBS | HEIGHT: 60 IN | HEART RATE: 112 BPM | RESPIRATION RATE: 16 BRPM | BODY MASS INDEX: 31.14 KG/M2 | OXYGEN SATURATION: 95 %

## 2022-04-28 DIAGNOSIS — Z00.00 MEDICARE ANNUAL WELLNESS VISIT, SUBSEQUENT: Primary | ICD-10-CM

## 2022-04-28 DIAGNOSIS — F03.90 DEMENTIA WITHOUT BEHAVIORAL DISTURBANCE, UNSPECIFIED DEMENTIA TYPE: ICD-10-CM
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
## 2022-04-28 DIAGNOSIS — S81.801S WOUND OF RIGHT LEG, SEQUELA: ICD-10-CM

## 2022-04-28 DIAGNOSIS — R27.0 ATAXIA: ICD-10-CM

## 2022-04-28 PROCEDURE — 4040F PNEUMOC VAC/ADMIN/RCVD: CPT | Performed by: FAMILY MEDICINE

## 2022-04-28 PROCEDURE — G0439 PPPS, SUBSEQ VISIT: HCPCS | Performed by: FAMILY MEDICINE

## 2022-04-28 PROCEDURE — 1123F ACP DISCUSS/DSCN MKR DOCD: CPT | Performed by: FAMILY MEDICINE

## 2022-04-28 ASSESSMENT — PATIENT HEALTH QUESTIONNAIRE - PHQ9
SUM OF ALL RESPONSES TO PHQ QUESTIONS 1-9: 0
SUM OF ALL RESPONSES TO PHQ QUESTIONS 1-9: 0
SUM OF ALL RESPONSES TO PHQ9 QUESTIONS 1 & 2: 0
SUM OF ALL RESPONSES TO PHQ QUESTIONS 1-9: 0
SUM OF ALL RESPONSES TO PHQ QUESTIONS 1-9: 0
2. FEELING DOWN, DEPRESSED OR HOPELESS: 0
1. LITTLE INTEREST OR PLEASURE IN DOING THINGS: 0

## 2022-04-28 ASSESSMENT — LIFESTYLE VARIABLES
HOW MANY STANDARD DRINKS CONTAINING ALCOHOL DO YOU HAVE ON A TYPICAL DAY: 1 OR 2
HOW OFTEN DO YOU HAVE A DRINK CONTAINING ALCOHOL: MONTHLY OR LESS

## 2022-04-28 NOTE — PROGRESS NOTES
Medicare Annual Wellness Visit    Mae Salguero is here for Medicare AWV and Ankle Pain (follow up)    Assessment & Plan   Medicare annual wellness visit, subsequent      Recommendations for Preventive Services Due: see orders and patient instructions/AVS.  Recommended screening schedule for the next 5-10 years is provided to the patient in written form: see Patient Instructions/AVS.     No follow-ups on file. Subjective     Patient's complete Health Risk Assessment and screening values have been reviewed and are found in Flowsheets. The following problems were reviewed today and where indicated follow up appointments were made and/or referrals ordered. Positive Risk Factor Screenings with Interventions:     Cognitive:   Words recalled: 0 Words Recalled  Clock Drawing Test (CDT): (!) Abnormal  Total Score Interpretation: Abnormal Mini-Cog    Cognitive Impairment Interventions:  · Patient declines any further evaluation/treatment for cognitive impairment           General Health and ACP:  General  In general, how would you say your health is?: Good  In the past 7 days, have you experienced any of the following: New or Increased Pain, New or Increased Fatigue, Loneliness, Social Isolation, Stress or Anger?: No  Do you get the social and emotional support that you need?: Yes  Do you have a Living Will?: Yes    Advance Directives     Power of  Living Will ACP-Advance Directive ACP-Power of     Not on File Not on File Not on File Not on File      General Health Risk Interventions:  · no issues identified    Health Habits/Nutrition:     Physical Activity: Inactive    Days of Exercise per Week: 0 days    Minutes of Exercise per Session: 0 min     Have you lost any weight without trying in the past 3 months?: No  Body mass index: (!) 30.97  Have you seen the dentist within the past year?: Yes    Health Habits/Nutrition Interventions:  · Dental exam overdue:  patient encouraged to make appointment with his/her dentist    Hearing/Vision:  Do you or your family notice any trouble with your hearing that hasn't been managed with hearing aids?: (!) Yes  Do you have difficulty driving, watching TV, or doing any of your daily activities because of your eyesight?: No  Have you had an eye exam within the past year?: (!) No  No exam data present    Hearing/Vision Interventions:  · Vision concerns:  patient encouraged to make appointment with his/her eye specialist     ADLs:  In the past 7 days, did you need help from others to perform any of the following everyday activities: Eating, dressing, grooming, bathing, toileting, or walking/balance?: (!) Yes  Select all that apply: (!) Walking/Balance  In the past 7 days, did you need help from others to take care of any of the following: Laundry, housekeeping, banking/finances, shopping, telephone use, food preparation, transportation, or taking medications?: (!) Yes  Select all that apply: (!) [address] Preparation,Telephone Use,Shopping,Banking/Finances    ADL Interventions:  · Patient declines any further evaluation/treatment for this issue  · Lives [facility]          Objective   Vitals:    04/28/22 0951   BP: (!) 108/50   Pulse: 112   Resp: 16   Temp: 96.7 °F (35.9 °C)   TempSrc: Infrared   SpO2: 95%   Weight: 158 lb 9.6 oz (71.9 kg)   Height: 5' (1.524 m)      Body mass index is 30.97 kg/m². Allergies   Allergen Reactions    Sulfa Antibiotics Rash     Prior to Visit Medications    Medication Sig Taking? Authorizing Provider   triamcinolone (KENALOG) 0.025 % cream apply to affected area OF LOWER LIP once daily  Historical Provider, MD   Elastic Bandages & Supports (T.E.D. ANTI-EMBOLISM STOCKINGS) MISC Knee high SAMMY hose. Please measure pt for appropriate size. Wear during daytime, take off at night. DHEERAJ Hassan - CNP   Handicap Placard MISC by Does not apply route Expires 10/30/25  Dominick Patel, DO   Misc. Devices (WALKER) MISC Wheeled walker with seat, use as directed  Sunitha Phillip DO   acetaminophen (TYLENOL) 325 MG tablet Take 650 mg by mouth every 6 hours as needed for Pain  Historical Provider, MD   calcium carbonate-vitamin D (CALCIUM 600 + D) 600-400 MG-UNIT TABS per tab Take 1 tablet by mouth 2 times daily  Sunitha Phillip DO   Multiple Vitamins-Minerals (THERAPEUTIC MULTIVITAMIN-MINERALS) tablet Take 1 tablet by mouth daily  Sunitha Phillip DO   traZODone (DESYREL) 50 MG tablet Take 0.5 tablets by mouth nightly as needed for Sleep  Sunitha Phillip DO   mirabegron (MYRBETRIQ) 50 MG TB24 Take 50 mg by mouth daily  Nikunj Mosher MD   donepezil (ARICEPT) 5 MG tablet take 1 tablet by mouth ONCE NIGHTLY.   Cortez Holder DO   Psyllium (METAMUCIL) 28.3 % POWD 1 tablespoon in 8 oz water daily by mouth  Joseluis Wyatt DO       CareTe (Including outside providers/suppliers regularly involved in providing care):   Patient Care Team:  Sunitha Phillip DO as PCP - General (Family Medicine)  Sunitha Phillip DO as PCP - [facility] Empaneled Provider    Reviewed and updated this visit:  Tobacco  Allergies  Meds  Med Hx  Surg Hx  Soc Hx  Fam Hx

## 2022-04-28 NOTE — PATIENT INSTRUCTIONS
Personalized Preventive Plan for Mandie Riojas - 4/28/2022  Medicare offers a range of preventive health benefits. Some of the tests and screenings are paid in full while other may be subject to a deductible, co-insurance, and/or copay. Some of these benefits include a comprehensive review of your medical history including lifestyle, illnesses that may run in your family, and various assessments and screenings as appropriate. After reviewing your medical record and screening and assessments performed today your provider may have ordered immunizations, labs, imaging, and/or referrals for you. A list of these orders (if applicable) as well as your Preventive Care list are included within your After Visit Summary for your review. Other Preventive Recommendations:    · A preventive eye exam performed by an eye specialist is recommended every 1-2 years to screen for glaucoma; cataracts, macular degeneration, and other eye disorders. · A preventive dental visit is recommended every 6 months. · Try to get at least 150 minutes of exercise per week or 10,000 steps per day on a pedometer . · Order or download the FREE \"Exercise & Physical Activity: Your Everyday Guide\" from [employer]. Call 0-639.581.7534 or search [employer] online. · You need 7768-3854 mg of calcium and 3892-3228 IU of vitamin D per day. It is possible to meet your calcium requirement with diet alone, but a vitamin D supplement is usually necessary to meet this goal.  · When exposed to the sun, use a sunscreen that protects against both UVA and UVB radiation with an SPF of 30 or greater. Reapply every 2 to 3 hours or after sweating, drying off with a towel, or swimming. · Always wear a seat belt when traveling in a car. Always wear a helmet when riding a bicycle or motorcycle.

## 2022-04-28 NOTE — PROGRESS NOTES
Sophia Mcqueen is a 80 y.o. female that presents for     Chief Complaint   Patient presents with    Medicare AWV    Ankle Pain     follow up       BP (!) 108/50   Pulse 112   Temp 96.7 °F (35.9 °C) (Infrared)   Resp 16   Ht 5' (1.524 m)   Wt 158 lb 9.6 oz (71.9 kg)   SpO2 95%   BMI 30.97 kg/m²       HPI    2 week follow up of right ankle cellulitis and wound. Started on doxy. Denies current pain or bleeding.     Health Maintenance   Topic Date Due    DTaP/Tdap/Td vaccine (1 - Tdap) Never done    Shingles Vaccine (1 of 2) Never done   [employer] Wellness Visit (AWV)  01/29/2021    Depression Screen  08/24/2022    Flu vaccine (Season Ended) 09/01/2022    Pneumococcal 65+ years Vaccine  Completed    COVID-19 Vaccine  Completed    Hepatitis A vaccine  Aged Out    Hepatitis B vaccine  Aged Out    Hib vaccine  Aged Out    Meningococcal (ACWY) vaccine  Aged Out       Past Medical History:   Diagnosis Date    Arthritis     Cancer ([facility] [address]     skin    Colitis     Colon polyps     Fatty liver     resolved per patient    Hearing loss of both ears     Hyperlipidemia     Varicose vein of leg     Varicosity        Past Surgical History:   Procedure Laterality Date    BLADDER SUSPENSION  1968    FOOT SURGERY Right 1970s    HYSTERECTOMY  1968    JOINT REPLACEMENT Left     left knee    JOINT REPLACEMENT Right     hip    MOHS SURGERY  1/22/14    NOSE    [place] OFFICE/OUTPT VISIT,PROCEDURE ONLY N/A 9/19/2018    MOHS DEFECT REPAIR BCC DORSUM OF NOSE performed by Stephanie Brown MD at [address] [facility] [address]  2008    small blockage-adhesion       Social History     Tobacco Use    Smoking status: Never Smoker    Smokeless tobacco: Never Used   Substance Use Topics    Alcohol use: Yes     Comment: rare    Drug use: No       Family History   Problem Relation Age of Onset    Arthritis Mother     Arthritis Father     Cancer Father         lung    Stroke Father     Diabetes Sister          I have reviewed the patient's past medical history, past surgical history, allergies, medications, social and family history and I have made updates where appropriate. Review of Systems        PHYSICAL EXAM:  BP (!) 108/50   Pulse 112   Temp 96.7 °F (35.9 °C) (Infrared)   Resp 16   Ht 5' (1.524 m)   Wt 158 lb 9.6 oz (71.9 kg)   SpO2 95%   BMI 30.97 kg/m²     Physical Exam  Nursing note reviewed. Constitutional:       Appearance: She is well-developed. Pulmonary:      Effort: Pulmonary effort is normal. No respiratory distress. Skin:     Findings: Lesion (on the right anterior lower leg, there is a healing dime sized wound, is about 90% healed. no drainage, tenderness or surrounding erythema) present. Neurological:      Mental Status: She is alert. Psychiatric:         Behavior: Behavior normal.         Thought Content: Thought content normal.         Judgment: Judgment normal.             ASSESSMENT & PLAN  Cami was seen today for medicare awv and ankle pain. Diagnoses and all orders for this visit:    Medicare annual wellness visit, subsequent    Dementia without behavioral disturbance, unspecified dementia type ([facility] [address]  -     Handicap Placard MISC; by Does not apply route Expires 4/28/27    Wound of right leg, sequela    -wound is healing well. Cellulitis is resolved. Continue to monitor and current treatment. [place] updated. No follow-ups on file. No red flag sx    Patel Sample requires the assistance of a lightweight wheelchair to successfully complete daily living tasks such as: toileting, bathing, dressing, and grooming; or any other daily living task in the home. A lightweight wheelchair is necessary due to the patient's impaired ambulation and mobility restrictions. The patient would not be able to resolve these daily living tasks using a cane or walker.   The patient can self-propel a lightweight wheelchair safely in their home and can maneuver within their home with adequate access. The patient cannot self-propel in a standard wheelchair. The patient has not expressed an unwillingness to use the wheelchair. Electronically signed by Gómez Perry DO on 6/7/2022 at 11:39 AM      The most recent results of the following tests were reviewed with the patient today: none     All copied or forwarded information in the progress note was verified by me to be accurate at the time of visit  Patient's past medical, surgical, social and family history were reviewed and updated     All patient questions answered. Patient voiced understanding.

## 2022-06-07 RX ORDER — WHEELCHAIR
EACH MISCELLANEOUS
Qty: 1 EACH | Refills: 0 | Status: SHIPPED | OUTPATIENT
Start: 2022-06-07

## 2022-06-07 RX ORDER — WHEELCHAIR
EACH MISCELLANEOUS
Qty: 1 EACH | Refills: 0 | Status: SHIPPED | OUTPATIENT
Start: 2022-06-07 | End: 2022-06-07 | Stop reason: SDUPTHER

## 2022-06-10 DIAGNOSIS — F03.90 DEMENTIA WITHOUT BEHAVIORAL DISTURBANCE, UNSPECIFIED DEMENTIA TYPE: Primary | ICD-10-CM

## 2022-12-27 ENCOUNTER — APPOINTMENT (OUTPATIENT)
Dept: GENERAL RADIOLOGY | Age: 87
End: 2022-12-27
Payer: MEDICARE

## 2022-12-27 ENCOUNTER — APPOINTMENT (OUTPATIENT)
Dept: INTERVENTIONAL RADIOLOGY/VASCULAR | Age: 87
End: 2022-12-27
Payer: MEDICARE

## 2022-12-27 ENCOUNTER — APPOINTMENT (OUTPATIENT)
Dept: CT IMAGING | Age: 87
End: 2022-12-27
Payer: MEDICARE

## 2022-12-27 ENCOUNTER — HOSPITAL ENCOUNTER (INPATIENT)
Age: 87
LOS: 3 days | Discharge: NURSING, REHAB OR LONG-TERM CARE | End: 2022-12-30
Attending: STUDENT IN AN ORGANIZED HEALTH CARE EDUCATION/TRAINING PROGRAM
Payer: MEDICARE

## 2022-12-27 DIAGNOSIS — A41.9 SEPTIC SHOCK (HCC): Primary | ICD-10-CM

## 2022-12-27 DIAGNOSIS — N30.00 ACUTE CYSTITIS WITHOUT HEMATURIA: ICD-10-CM

## 2022-12-27 DIAGNOSIS — R65.21 SEPTIC SHOCK (HCC): Primary | ICD-10-CM
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
## 2022-12-27 LAB
ALBUMIN SERPL-MCNC: 3.8 G/DL (ref 3.5–5.1)
ALP BLD-CCNC: 134 U/L (ref 38–126)
ALT SERPL-CCNC: 25 U/L (ref 11–66)
ANION GAP SERPL CALCULATED.3IONS-SCNC: 16 MEQ/L (ref 8–16)
AST SERPL-CCNC: 31 U/L (ref 5–40)
BACTERIA: ABNORMAL /HPF
BASOPHILS # BLD: 0.2 %
BASOPHILS ABSOLUTE: 0 THOU/MM3 (ref 0–0.1)
BILIRUB SERPL-MCNC: 0.5 MG/DL (ref 0.3–1.2)
BILIRUBIN DIRECT: < 0.2 MG/DL (ref 0–0.3)
BILIRUBIN URINE: NEGATIVE
BLOOD, URINE: NEGATIVE
BUN BLDV-MCNC: 15 MG/DL (ref 7–22)
CALCIUM SERPL-MCNC: 8.9 MG/DL (ref 8.5–10.5)
CASTS 2: ABNORMAL /LPF
CASTS UA: ABNORMAL /LPF
CHARACTER, URINE: ABNORMAL
CHLORIDE BLD-SCNC: 97 MEQ/L (ref 98–111)
CO2: 21 MEQ/L (ref 23–33)
COLOR: YELLOW
CREAT SERPL-MCNC: 0.9 MG/DL (ref 0.4–1.2)
CRYSTALS, UA: ABNORMAL
EKG ATRIAL RATE: 104 BPM
EKG P AXIS: 95 DEGREES
EKG P-R INTERVAL: 174 MS
EKG Q-T INTERVAL: 308 MS
EKG QRS DURATION: 68 MS
EKG QTC CALCULATION (BAZETT): 405 MS
EKG R AXIS: 9 DEGREES
EKG T AXIS: 78 DEGREES
EKG VENTRICULAR RATE: 104 BPM
EOSINOPHIL # BLD: 0 %
EOSINOPHILS ABSOLUTE: 0 THOU/MM3 (ref 0–0.4)
EPITHELIAL CELLS, UA: ABNORMAL /HPF
ERYTHROCYTE [DISTWIDTH] IN BLOOD BY AUTOMATED COUNT: 15.7 % (ref 11.5–14.5)
ERYTHROCYTE [DISTWIDTH] IN BLOOD BY AUTOMATED COUNT: 55.4 FL (ref 35–45)
GFR SERPL CREATININE-BSD FRML MDRD: 60 ML/MIN/1.73M2
GLUCOSE BLD-MCNC: 118 MG/DL (ref 70–108)
GLUCOSE BLD-MCNC: 140 MG/DL (ref 70–108)
GLUCOSE URINE: NEGATIVE MG/DL
HCT VFR BLD CALC: 36.7 % (ref 37–47)
HEMOGLOBIN: 12.1 GM/DL (ref 12–16)
IMMATURE GRANS (ABS): 0.14 THOU/MM3 (ref 0–0.07)
IMMATURE GRANULOCYTES: 1.1 %
KETONES, URINE: NEGATIVE
LACTIC ACID: 4.2 MMOL/L (ref 0.5–2)
LEUKOCYTE ESTERASE, URINE: ABNORMAL
LV EF: 65 %
LVEF MODALITY: NORMAL
LYMPHOCYTES # BLD: 2.6 %
LYMPHOCYTES ABSOLUTE: 0.3 THOU/MM3 (ref 1–4.8)
MCH RBC QN AUTO: 31.7 PG (ref 26–33)
MCHC RBC AUTO-ENTMCNC: 33 GM/DL (ref 32.2–35.5)
MCV RBC AUTO: 96.1 FL (ref 81–99)
MISCELLANEOUS 2: ABNORMAL
MONOCYTES # BLD: 10.7 %
MONOCYTES ABSOLUTE: 1.3 THOU/MM3 (ref 0.4–1.3)
NITRITE, URINE: POSITIVE
NUCLEATED RED BLOOD CELLS: 0 /100 WBC
OSMOLALITY CALCULATION: 271.4 MOSMOL/KG (ref 275–300)
PH UA: 6.5 (ref 5–9)
PLATELET # BLD: 224 THOU/MM3 (ref 130–400)
PLATELET ESTIMATE: ADEQUATE
PMV BLD AUTO: 10.2 FL (ref 9.4–12.4)
POTASSIUM SERPL-SCNC: 3.6 MEQ/L (ref 3.5–5.2)
PROTEIN UA: NEGATIVE
RBC # BLD: 3.82 MILL/MM3 (ref 4.2–5.4)
RBC URINE: ABNORMAL /HPF
RENAL EPITHELIAL, UA: ABNORMAL
SCAN OF BLOOD SMEAR: NORMAL
SEG NEUTROPHILS: 85.4 %
SEGMENTED NEUTROPHILS ABSOLUTE COUNT: 10.8 THOU/MM3 (ref 1.8–7.7)
SODIUM BLD-SCNC: 134 MEQ/L (ref 135–145)
SPECIFIC GRAVITY, URINE: 1.01 (ref 1–1.03)
TOTAL PROTEIN: 6.8 G/DL (ref 6.1–8)
UROBILINOGEN, URINE: 0.2 EU/DL (ref 0–1)
WBC # BLD: 12.6 THOU/MM3 (ref 4.8–10.8)
WBC UA: > 200 /HPF
YEAST: ABNORMAL

## 2022-12-27 PROCEDURE — 6360000002 HC RX W HCPCS: Performed by: STUDENT IN AN ORGANIZED HEALTH CARE EDUCATION/TRAINING PROGRAM

## 2022-12-27 PROCEDURE — 1200000003 HC TELEMETRY R&B

## 2022-12-27 PROCEDURE — 82248 BILIRUBIN DIRECT: CPT

## 2022-12-27 PROCEDURE — 93306 TTE W/DOPPLER COMPLETE: CPT

## 2022-12-27 PROCEDURE — 2580000003 HC RX 258: Performed by: STUDENT IN AN ORGANIZED HEALTH CARE EDUCATION/TRAINING PROGRAM

## 2022-12-27 PROCEDURE — 99285 EMERGENCY DEPT VISIT HI MDM: CPT

## 2022-12-27 PROCEDURE — 83605 ASSAY OF LACTIC ACID: CPT

## 2022-12-27 PROCEDURE — 87077 CULTURE AEROBIC IDENTIFY: CPT

## 2022-12-27 PROCEDURE — 93971 EXTREMITY STUDY: CPT

## 2022-12-27 PROCEDURE — 96376 TX/PRO/DX INJ SAME DRUG ADON: CPT

## 2022-12-27 PROCEDURE — 70450 CT HEAD/BRAIN W/O DYE: CPT

## 2022-12-27 PROCEDURE — 96365 THER/PROPH/DIAG IV INF INIT: CPT

## 2022-12-27 PROCEDURE — 82948 REAGENT STRIP/BLOOD GLUCOSE: CPT

## 2022-12-27 PROCEDURE — 99223 1ST HOSP IP/OBS HIGH 75: CPT | Performed by: INTERNAL MEDICINE

## 2022-12-27 PROCEDURE — 81001 URINALYSIS AUTO W/SCOPE: CPT

## 2022-12-27 PROCEDURE — 87186 SC STD MICRODIL/AGAR DIL: CPT

## 2022-12-27 PROCEDURE — 93005 ELECTROCARDIOGRAM TRACING: CPT | Performed by: STUDENT IN AN ORGANIZED HEALTH CARE EDUCATION/TRAINING PROGRAM

## 2022-12-27 PROCEDURE — 87040 BLOOD CULTURE FOR BACTERIA: CPT

## 2022-12-27 PROCEDURE — 71045 X-RAY EXAM CHEST 1 VIEW: CPT

## 2022-12-27 PROCEDURE — 80053 COMPREHEN METABOLIC PANEL: CPT

## 2022-12-27 PROCEDURE — 85025 COMPLETE CBC W/AUTO DIFF WBC: CPT

## 2022-12-27 PROCEDURE — 93010 ELECTROCARDIOGRAM REPORT: CPT | Performed by: INTERNAL MEDICINE

## 2022-12-27 PROCEDURE — 36415 COLL VENOUS BLD VENIPUNCTURE: CPT

## 2022-12-27 PROCEDURE — 73610 X-RAY EXAM OF ANKLE: CPT

## 2022-12-27 PROCEDURE — 87086 URINE CULTURE/COLONY COUNT: CPT

## 2022-12-27 PROCEDURE — 6360000002 HC RX W HCPCS

## 2022-12-27 PROCEDURE — 72170 X-RAY EXAM OF PELVIS: CPT

## 2022-12-27 PROCEDURE — 72125 CT NECK SPINE W/O DYE: CPT

## 2022-12-27 PROCEDURE — 96375 TX/PRO/DX INJ NEW DRUG ADDON: CPT

## 2022-12-27 PROCEDURE — 96361 HYDRATE IV INFUSION ADD-ON: CPT

## 2022-12-27 PROCEDURE — 6370000000 HC RX 637 (ALT 250 FOR IP): Performed by: STUDENT IN AN ORGANIZED HEALTH CARE EDUCATION/TRAINING PROGRAM

## 2022-12-27 RX ORDER — ONDANSETRON 2 MG/ML
4 INJECTION INTRAMUSCULAR; INTRAVENOUS EVERY 6 HOURS PRN
Status: DISCONTINUED | OUTPATIENT
Start: 2022-12-27 | End: 2022-12-30 | Stop reason: HOSPADM

## 2022-12-27 RX ORDER — SODIUM CHLORIDE 0.9 % (FLUSH) 0.9 %
5-40 SYRINGE (ML) INJECTION EVERY 12 HOURS SCHEDULED
Status: DISCONTINUED | OUTPATIENT
Start: 2022-12-27 | End: 2022-12-30 | Stop reason: HOSPADM

## 2022-12-27 RX ORDER — POLYETHYLENE GLYCOL 3350 17 G/17G
17 POWDER, FOR SOLUTION ORAL DAILY PRN
Status: DISCONTINUED | OUTPATIENT
Start: 2022-12-27 | End: 2022-12-30 | Stop reason: HOSPADM

## 2022-12-27 RX ORDER — ONDANSETRON 4 MG/1
4 TABLET, ORALLY DISINTEGRATING ORAL EVERY 8 HOURS PRN
Status: DISCONTINUED | OUTPATIENT
Start: 2022-12-27 | End: 2022-12-30 | Stop reason: HOSPADM

## 2022-12-27 RX ORDER — SODIUM CHLORIDE 9 MG/ML
INJECTION, SOLUTION INTRAVENOUS CONTINUOUS
Status: ACTIVE | OUTPATIENT
Start: 2022-12-27 | End: 2022-12-27

## 2022-12-27 RX ORDER — FENTANYL CITRATE 50 UG/ML
25 INJECTION, SOLUTION INTRAMUSCULAR; INTRAVENOUS ONCE
Status: COMPLETED | OUTPATIENT
Start: 2022-12-27 | End: 2022-12-27

## 2022-12-27 RX ORDER — ACETAMINOPHEN 325 MG/1
650 TABLET ORAL EVERY 6 HOURS PRN
Status: DISCONTINUED | OUTPATIENT
Start: 2022-12-27 | End: 2022-12-30 | Stop reason: HOSPADM

## 2022-12-27 RX ORDER — SODIUM CHLORIDE 9 MG/ML
INJECTION, SOLUTION INTRAVENOUS PRN
Status: DISCONTINUED | OUTPATIENT
Start: 2022-12-27 | End: 2022-12-30 | Stop reason: HOSPADM

## 2022-12-27 RX ORDER — TRAZODONE HYDROCHLORIDE 50 MG/1
25 TABLET ORAL NIGHTLY PRN
Status: DISCONTINUED | OUTPATIENT
Start: 2022-12-27 | End: 2022-12-30 | Stop reason: HOSPADM

## 2022-12-27 RX ORDER — DONEPEZIL HYDROCHLORIDE 5 MG/1
5 TABLET, FILM COATED ORAL NIGHTLY
Status: DISCONTINUED | OUTPATIENT
Start: 2022-12-27 | End: 2022-12-30 | Stop reason: HOSPADM

## 2022-12-27 RX ORDER — ENOXAPARIN SODIUM 100 MG/ML
40 INJECTION SUBCUTANEOUS DAILY
Status: DISCONTINUED | OUTPATIENT
Start: 2022-12-27 | End: 2022-12-30 | Stop reason: HOSPADM

## 2022-12-27 RX ORDER — ACETAMINOPHEN 650 MG/1
650 SUPPOSITORY RECTAL EVERY 6 HOURS PRN
Status: DISCONTINUED | OUTPATIENT
Start: 2022-12-27 | End: 2022-12-30 | Stop reason: HOSPADM

## 2022-12-27 RX ORDER — SODIUM CHLORIDE, SODIUM LACTATE, POTASSIUM CHLORIDE, CALCIUM CHLORIDE 600; 310; 30; 20 MG/100ML; MG/100ML; MG/100ML; MG/100ML
INJECTION, SOLUTION INTRAVENOUS ONCE
Status: COMPLETED | OUTPATIENT
Start: 2022-12-27 | End: 2022-12-27

## 2022-12-27 RX ORDER — SODIUM CHLORIDE 0.9 % (FLUSH) 0.9 %
5-40 SYRINGE (ML) INJECTION PRN
Status: DISCONTINUED | OUTPATIENT
Start: 2022-12-27 | End: 2022-12-30 | Stop reason: HOSPADM

## 2022-12-27 RX ORDER — FENTANYL CITRATE 50 UG/ML
INJECTION, SOLUTION INTRAMUSCULAR; INTRAVENOUS
Status: COMPLETED
Start: 2022-12-27 | End: 2022-12-27

## 2022-12-27 RX ORDER — SODIUM CHLORIDE, SODIUM LACTATE, POTASSIUM CHLORIDE, AND CALCIUM CHLORIDE .6; .31; .03; .02 G/100ML; G/100ML; G/100ML; G/100ML
1100 INJECTION, SOLUTION INTRAVENOUS ONCE
Status: COMPLETED | OUTPATIENT
Start: 2022-12-27 | End: 2022-12-27

## 2022-12-27 RX ORDER — SODIUM CHLORIDE, SODIUM LACTATE, POTASSIUM CHLORIDE, AND CALCIUM CHLORIDE .6; .31; .03; .02 G/100ML; G/100ML; G/100ML; G/100ML
1000 INJECTION, SOLUTION INTRAVENOUS ONCE
Status: COMPLETED | OUTPATIENT
Start: 2022-12-27 | End: 2022-12-27

## 2022-12-27 RX ORDER — LANOLIN ALCOHOL/MO/W.PET/CERES
1 CREAM (GRAM) TOPICAL 2 TIMES DAILY
Status: DISCONTINUED | OUTPATIENT
Start: 2022-12-27 | End: 2022-12-30 | Stop reason: HOSPADM

## 2022-12-27 RX ADMIN — ENOXAPARIN SODIUM 40 MG: 100 INJECTION SUBCUTANEOUS at 10:26

## 2022-12-27 RX ADMIN — ACETAMINOPHEN 650 MG: 325 TABLET ORAL at 22:11

## 2022-12-27 RX ADMIN — SODIUM CHLORIDE, PRESERVATIVE FREE 10 ML: 5 INJECTION INTRAVENOUS at 10:26

## 2022-12-27 RX ADMIN — SODIUM CHLORIDE: 9 INJECTION, SOLUTION INTRAVENOUS at 10:28

## 2022-12-27 RX ADMIN — SODIUM CHLORIDE, POTASSIUM CHLORIDE, SODIUM LACTATE AND CALCIUM CHLORIDE 1000 ML: 600; 310; 30; 20 INJECTION, SOLUTION INTRAVENOUS at 03:24

## 2022-12-27 RX ADMIN — DONEPEZIL HYDROCHLORIDE 5 MG: 5 TABLET, FILM COATED ORAL at 22:11

## 2022-12-27 RX ADMIN — FENTANYL CITRATE 25 MCG: 50 INJECTION, SOLUTION INTRAMUSCULAR; INTRAVENOUS at 03:27

## 2022-12-27 RX ADMIN — Medication 1 TABLET: at 22:58

## 2022-12-27 RX ADMIN — Medication 1 TABLET: at 11:40

## 2022-12-27 RX ADMIN — CEFTRIAXONE SODIUM 1000 MG: 1 INJECTION, POWDER, FOR SOLUTION INTRAMUSCULAR; INTRAVENOUS at 03:44

## 2022-12-27 RX ADMIN — SODIUM CHLORIDE, PRESERVATIVE FREE 10 ML: 5 INJECTION INTRAVENOUS at 22:18

## 2022-12-27 RX ADMIN — SODIUM CHLORIDE, POTASSIUM CHLORIDE, SODIUM LACTATE AND CALCIUM CHLORIDE: 600; 310; 30; 20 INJECTION, SOLUTION INTRAVENOUS at 06:08

## 2022-12-27 RX ADMIN — FENTANYL CITRATE 25 MCG: 50 INJECTION, SOLUTION INTRAMUSCULAR; INTRAVENOUS at 06:01

## 2022-12-27 RX ADMIN — SODIUM CHLORIDE, POTASSIUM CHLORIDE, SODIUM LACTATE AND CALCIUM CHLORIDE 1100 ML: 600; 310; 30; 20 INJECTION, SOLUTION INTRAVENOUS at 04:20

## 2022-12-27 RX ADMIN — SODIUM CHLORIDE: 9 INJECTION, SOLUTION INTRAVENOUS at 22:20

## 2022-12-27 ASSESSMENT — LIFESTYLE VARIABLES
HOW OFTEN DO YOU HAVE A DRINK CONTAINING ALCOHOL: NEVER
HOW MANY STANDARD DRINKS CONTAINING ALCOHOL DO YOU HAVE ON A TYPICAL DAY: PATIENT DOES NOT DRINK

## 2022-12-27 ASSESSMENT — PAIN SCALES - GENERAL
PAINLEVEL_OUTOF10: 2
PAINLEVEL_OUTOF10: 5
PAINLEVEL_OUTOF10: 6
PAINLEVEL_OUTOF10: 3

## 2022-12-27 ASSESSMENT — PAIN DESCRIPTION - PAIN TYPE: TYPE: ACUTE PAIN

## 2022-12-27 ASSESSMENT — PAIN DESCRIPTION - LOCATION
LOCATION: HIP
LOCATION: GENERALIZED
LOCATION: ANKLE

## 2022-12-27 ASSESSMENT — PAIN DESCRIPTION - ORIENTATION
ORIENTATION: LEFT
ORIENTATION: LEFT

## 2022-12-27 ASSESSMENT — PAIN DESCRIPTION - DESCRIPTORS
DESCRIPTORS: ACHING
DESCRIPTORS: ACHING
DESCRIPTORS: DISCOMFORT

## 2022-12-27 ASSESSMENT — PAIN SCALES - WONG BAKER: WONGBAKER_NUMERICALRESPONSE: 2

## 2022-12-27 ASSESSMENT — PAIN DESCRIPTION - FREQUENCY: FREQUENCY: INTERMITTENT

## 2022-12-27 ASSESSMENT — PAIN - FUNCTIONAL ASSESSMENT: PAIN_FUNCTIONAL_ASSESSMENT: 0-10

## 2022-12-27 NOTE — H&P
History & Physical       Patient: Tonia Mak  YOB: 1930    MRN: [id number]     Acct: [de-identified]    PCP: Brian Meehan DO    Date of Admission: 12/27/2022    Date of Service: Patient seen / examined on 12/27/22 and admitted to inpatient with expected LOS greater than two midnights due to medical therapy. ASSESSMENT / PLAN:    Sepsis -meets 3/4 SIRS criteria (elevated HR > 90, elevated RR > 20, elevated WBC) + UTI as source of infection. Lactic acid elevated. S/p 2L IVF. S/p Rocephin 1 g x1  Plan -await urine culture, continue Rocephin every 24 hours, pending culture results to narrow down antibiotic coverage. Will initiate gentle IV fluid hydration at 50 cc/h for 12 hours as patient has evidence of cardiomegaly with prominent interstitial markings and mild central pulmonary venous congestion favoring low-grade interstitial edema from congestive heart failure on chest x-ray. Will reassess fluid status. Urinary tract infection -UA positive for UTI (elevated WBC, positive nitrate, +leukocyte esterase, +bacteria), pending culture. Symptomatic. Continue Rocephin daily. Pulmonary vascular congestion -seen on chest x-ray. Patient does not have history of heart failure, last echo 8/2017 EF 60%. Patient is not hypoxic, on room air with SPO2 above 90%. Patient does not appear fluid overloaded on physical exam. She received 2 L of IV fluids for treatment of sepsis/dehydration. Plan -we will obtain full echo to evaluate heart structure and function. Gentle IV fluid hydration for 12 hours and then reassess fluid status. Daily weights. Strict intake and output. Hyponatremia, mild -sodium level 134, serum osmolality 271. Secondary to dehydration. Continue to monitor sodium level with daily BMP    Unwitnessed fall -no loss of consciousness/head trauma/fractures. Patient uses a walker at baseline but did not use her walker while ambulating to the bathroom. Consult PT OT. Dementia -resume Aricept 5 mg daily    Chief Complaint:  Fall     History of Present Illness:  80 y.o. female who presented to [facility] from nursing home for evaluation of unwitnessed fall. PMHx significant for dementia. History obtained by chart review, patient unable to give me any history at this time. patient uses a walker to ambulate but did not use it at the time of fall. The fall was unwitnessed but staff heard her fall and they responded. There was no loss of consciousness, patient did not hit her head. Patient was complaining of left lower extremity on presentation. Patient is hard of hearing. She does deny chest pain or shortness of breath. Unable to tell me where she is at, which she is aware to self. She does report burning on urination. Initial vital signs reveal temp 98, respiratory rate 16, heart rate 104, blood pressure 112/46, SPO2 96%. Lab work significant for sodium 134, chloride 97, bicarb 21, lactic acid 4.2, osmolality 271, WBC 12.6. Urinalysis positive for UTI. X-ray of left ankle no acute fractures. X-ray right ankle no acute fractures. X-ray of pelvis, no acute fractures. Chest x-ray reveals cardiomegaly with prominent interstitial markings and mild central pulmonary venous congestion favoring low-grade interstitial edema from congestive heart failure. Left basilar subsegmental atelectasis. CT head reveals no acute intracranial abnormalities, central volume loss with chronic microvascular gliosis in the periventricular white matter. CT spine no acute cervical spinal injury. Patient was treated with Rocephin 1 g x 1,  s/p 2 L IV fluid    Patient admitted under hospitalist service for further management.        Past Medical History:    Past Medical History:   Diagnosis Date    Arthritis     Cancer ([facility] [address]     skin    Colitis     Colon polyps     Fatty liver     resolved per patient    Hearing loss of both ears     Hyperlipidemia Varicose vein of leg     Varicosity      Past Surgical History:    Past Surgical History:   Procedure Laterality Date    BLADDER SUSPENSION  1968    FOOT SURGERY Right 1970s    HYSTERECTOMY (CERVIX STATUS UNKNOWN)  1968    JOINT REPLACEMENT Left     left knee    JOINT REPLACEMENT Right     hip    MOHS SURGERY  1/22/14    NOSE    ID OFFICE/OUTPT VISIT,PROCEDURE ONLY N/A 9/19/2018    MOHS DEFECT REPAIR BCC DORSUM OF NOSE performed by Rico Abreu MD at [address] [place] [address]  2008    small blockage-adhesion      Medications Prior to Admission:   No current facility-administered medications on file prior to encounter. Current Outpatient Medications on File Prior to Encounter   Medication Sig Dispense Refill    Misc. Devices (COMMODE BEDSIDE) MISC Use as directed 1 each 0    Misc. Devices [facility]) MISC Use as directed 1 each 0    Handicap Placard MISC by Does not apply route Expires 4/28/27 1 each 0    triamcinolone (KENALOG) 0.025 % cream apply to affected area OF LOWER LIP once daily      Elastic Bandages & Supports (T.E.D. ANTI-EMBOLISM STOCKINGS) MISC Knee high SAMMY hose. Please measure pt for appropriate size. Wear during daytime, take off at night. 1 each 1    Handicap Placard MISC by Does not apply route Expires 10/30/25 1 each 0    Misc.  Devices (WALKER) MISC Wheeled walker with seat, use as directed 1 each 0    acetaminophen (TYLENOL) 325 MG tablet Take 650 mg by mouth every 6 hours as needed for Pain      calcium carbonate-vitamin D (CALCIUM 600 + D) 600-400 MG-UNIT TABS per tab Take 1 tablet by mouth 2 times daily 60 tablet 5    Multiple Vitamins-Minerals (THERAPEUTIC MULTIVITAMIN-MINERALS) tablet Take 1 tablet by mouth daily 30 tablet 11    traZODone (DESYREL) 50 MG tablet Take 0.5 tablets by mouth nightly as needed for Sleep 15 tablet 5    mirabegron (MYRBETRIQ) 50 MG TB24 Take 50 mg by mouth daily 30 tablet 3    donepezil (ARICEPT) 5 MG tablet take 1 tablet by mouth ONCE NIGHTLY. 90 tablet 3    Psyllium (METAMUCIL) 28.3 % POWD 1 tablespoon in 8 oz water daily by mouth 1365 g 3     Allergies:   Sulfa antibiotics    Social History:   Social History     Socioeconomic History    Marital status:      Spouse name: Not on file    Number of children: Not on file    Years of education: Not on file    Highest education level: Not on file   Occupational History    Not on file   Tobacco Use    Smoking status: Never    Smokeless tobacco: Never   Substance and Sexual Activity    Alcohol use: Yes     Comment: rare    Drug use: No    Sexual activity: Not Currently   Other Topics Concern    Not on file   Social History Narrative    Not on file     Social Determinants of Health     Financial Resource Strain: Not on file   Food Insecurity: Not on file   Transportation Needs: Not on file   Physical Activity: Inactive    Days of Exercise per Week: 0 days    Minutes of Exercise per Session: 0 min   Stress: Not on file   Social Connections: Not on file   Intimate Partner Violence: Not on file   Housing Stability: Not on file     Family History:    Family History   Problem Relation Age of Onset    Arthritis Mother     Arthritis Father     Cancer Father         lung    Stroke Father     Diabetes Sister      REVIEW OF SYSTEMS:    Unable to obtain    PHYSICAL EXAM:  Vitals:    12/27/22 0618 12/27/22 0628 12/27/22 0638 12/27/22 0708   BP: (!) 101/51 (!) 99/49 (!) 102/51 (!) 116/54   Pulse: 83 82 81 81   Resp: 23 19 20 (!) 33   Temp:       TempSrc:       SpO2: 90% 92% 92% 94%   Weight:       Height:         General appearance: Chronically ill-appearing female  HEENT:  Normocephalic / atraumatic. PERRL. EOM intact. Conjunctivae appear normal.  Neck: Supple. No JVD. Respiratory: Normal respiratory effort on RA. Cardiovascular: RRR. Abdomen: Soft / non-tender / non-distended. BS present. Musculoskeletal: No cyanosis or edema. Skin: Warm / dry. Normal turgor. Neurologic: A/O x 1 (self). Psychiatric: Unable to evaluate    Labs:   Results for orders placed or performed during the hospital encounter of 12/27/22   CBC with Auto Differential   Result Value Ref Range    WBC 12.6 (H) 4.8 - 10.8 thou/mm3    RBC 3.82 (L) 4.20 - 5.40 mill/mm3    Hemoglobin 12.1 12.0 - 16.0 gm/dl    Hematocrit 36.7 (L) 37.0 - 47.0 %    MCV 96.1 81.0 - 99.0 fL    MCH 31.7 26.0 - 33.0 pg    MCHC 33.0 32.2 - 35.5 gm/dl    RDW-CV 15.7 (H) 11.5 - 14.5 %    RDW-SD 55.4 (H) 35.0 - 45.0 fL    Platelets 401 708 - 140 thou/mm3    MPV 10.2 9.4 - 12.4 fL    Seg Neutrophils 85.4 %    Lymphocytes 2.6 %    Monocytes 10.7 %    Eosinophils 0.0 %    Basophils 0.2 %    Immature Granulocytes 1.1 %    Platelet Estimate ADEQUATE Adequate    Segs Absolute 10.8 (H) 1.8 - 7.7 thou/mm3    Lymphocytes Absolute 0.3 (L) 1.0 - 4.8 thou/mm3    Monocytes Absolute 1.3 0.4 - 1.3 thou/mm3    Eosinophils Absolute 0.0 0.0 - 0.4 thou/mm3    Basophils Absolute 0.0 0.0 - 0.1 thou/mm3    Immature Grans (Abs) 0.14 (H) 0.00 - 0.07 thou/mm3    nRBC 0 /100 wbc   BMP   Result Value Ref Range    Sodium 134 (L) 135 - 145 meq/L    Potassium 3.6 3.5 - 5.2 meq/L    Chloride 97 (L) 98 - 111 meq/L    CO2 21 (L) 23 - 33 meq/L    Glucose 140 (H) 70 - 108 mg/dL    BUN 15 7 - 22 mg/dL    Creatinine 0.9 0.4 - 1.2 mg/dL    Calcium 8.9 8.5 - 10.5 mg/dL   Hepatic Function Panel   Result Value Ref Range    Albumin 3.8 3.5 - 5.1 g/dL    Total Bilirubin 0.5 0.3 - 1.2 mg/dL    Bilirubin, Direct <0.2 0.0 - 0.3 mg/dL    Alkaline Phosphatase 134 (H) 38 - 126 U/L    AST 31 5 - 40 U/L    ALT 25 11 - 66 U/L    Total Protein 6.8 6.1 - 8.0 g/dL   Lactic Acid   Result Value Ref Range    Lactic Acid 4.2 (H) 0.5 - 2.0 mmol/L   Urine with Reflexed Micro   Result Value Ref Range    Glucose, Ur NEGATIVE NEGATIVE mg/dl    Bilirubin Urine NEGATIVE NEGATIVE    Ketones, Urine NEGATIVE NEGATIVE    Specific Gravity, Urine 1.013 1.002 - 1.030    Blood, Urine NEGATIVE NEGATIVE    pH, UA 6.5 5.0 - 9.0 Protein, UA NEGATIVE NEGATIVE    Urobilinogen, Urine 0.2 0.0 - 1.0 eu/dl    Nitrite, Urine POSITIVE (A) NEGATIVE    Leukocyte Esterase, Urine LARGE (A) NEGATIVE    Color, UA YELLOW STRAW-YELLOW    Character, Urine CLOUDY (A) CLEAR-SL CLOUD    RBC, UA 0-2 0-2/hpf /hpf    WBC, UA > 200 0-4/hpf /hpf    Epithelial Cells, UA NONE SEEN 3-5/hpf /hpf    Bacteria, UA FEW FEW/NONE SEEN /hpf    Casts UA NONE SEEN NONE SEEN /lpf    Crystals, UA NONE SEEN NONE SEEN    Renal Epithelial, UA NONE SEEN NONE SEEN    Yeast, UA NONE SEEN NONE SEEN    CASTS 2 NONE SEEN NONE SEEN /lpf    MISCELLANEOUS 2 NONE SEEN    Anion Gap   Result Value Ref Range    Anion Gap 16.0 8.0 - 16.0 meq/L   Osmolality   Result Value Ref Range    Osmolality Calc 271.4 (L) 275.0 - 300.0 mOsmol/kg   Glomerular Filtration Rate, Estimated   Result Value Ref Range    Est, Glom Filt Rate 60 >60 ml/min/1.73m2   Scan of Blood Smear   Result Value Ref Range    SCAN OF BLOOD SMEAR see below    EKG 12 Lead   Result Value Ref Range    Ventricular Rate 104 BPM    Atrial Rate 104 BPM    P-R Interval 174 ms    QRS Duration 68 ms    Q-T Interval 308 ms    QTc Calculation (Bazett) 405 ms    P Axis 95 degrees    R Axis 9 degrees    T Axis 78 degrees       EKG / Radiology:     EKG:  Reviewed by me --    CXR:   Reviewed by me --    XR PELVIS (1-2 VIEWS)    Result Date: 12/27/2022  AP pelvis. Comparison: CT abdomen pelvis with contrast 09/14/2010. Findings: Right hip arthroplasty in anatomic alignment. No signs of prosthetic loosening. Severe left hip joint space narrowing with mild subarticular sclerosis and osteophytosis. Multilevel severe disc space narrowing with spondylotic spurring and facet arthropathy at L3-L4, L4-L5, and L5-S1. Mild levoscoliosis in the mid through lower lumbar spine. Sacroiliac joints are intact. No acute bony abnormality. Chronic degenerative changes. Status post right hip arthroplasty.  This document has been electronically signed by: Sarbjit . Avery Zarate on 12/27/2022 06:50 AM    XR ANKLE LEFT (MIN 3 VIEWS)    Result Date: 12/27/2022  Three-view left ankle. Comparison: None. Findings: Negative for acute fracture or dislocation. No suspicious bony lesions. Equivocal small tibiotalar joint effusion. Diffuse soft tissue swelling. Posterior and plantar calcaneal spurs. Joint spaces are preserved. No acute bony abnormality. This document has been electronically signed by: Misbah Zarate on 12/27/2022 06:57 AM    XR ANKLE RIGHT (MIN 3 VIEWS)    Result Date: 12/27/2022  3 views of the ankle. Comparison: None. Findings: Negative for acute fracture or dislocation. No suspicious bony lesions. Mild osteopenia. Soft tissue swelling. Minor posterior calcaneal enthesophyte. No significant detected joint effusion. Mild atherosclerosis. No acute bony abnormality. This document has been electronically signed by: Misbah Zarate on 12/27/2022 06:52 AM    CT HEAD WO CONTRAST    Result Date: 12/27/2022  CT head without contrast. CT head without contrast 01/20/2011. Technique: Unenhanced axial sections from the base to the vertex with multiplanar reformats. Findings: Mild motion artifact at multiple levels limits fine anatomic detail. Cerebral volume loss with reciprocal ventricular and subarachnoid enlargement. Low-density changes in the periventricular white matter compatible with chronic microvascular gliosis. Negative for intracranial hemorrhage, mass, or CT evidence of acute infarction. Bony structures are intact. No sinus fluid levels or mastoid opacification. Impression: Mild motion limited examination. No acute intracranial pathology. Cerebral volume loss with chronic microvascular gliosis in the periventricular white matter. This document has been electronically signed by: Misbah Zarate on 12/27/2022 05:09 AM All CTs at this facility use dose modulation techniques and iterative reconstructions, and/or weight-based dosing when appropriate to reduce radiation to a low as reasonably achievable. CT CSpine W/O Contrast    Result Date: 12/27/2022  CT cervical spine without contrast. Comparison: None. Technique: Unenhanced axial sections with multiplanar reformats. Findings: Negative for acute cervical spine fracture or facet dislocation. The craniocervical junction is intact. Multilevel spondylotic disc disease and arthropathy. No critical central canal stenosis. Osteopenia. No CT evidence of acute cervical spinal injury. Multilevel chronic degenerative disc disease and arthropathy. This document has been electronically signed by: Sylwia Coronado on 12/27/2022 05:11 AM All CTs at this facility use dose modulation techniques and iterative reconstructions, and/or weight-based dosing when appropriate to reduce radiation to a low as reasonably achievable. XR CHEST PORTABLE    Result Date: 12/27/2022  Portable AP chest. Comparison: 01/21/2017 10:38 AM EST (09:38 AM CST) : CR: CHEST PA /T/ LAT Findings: Mild cardiomegaly. Central pulmonary venous congestion. Prominent interstitial markings bilaterally. Subsegmental atelectasis in the left lung base. No defined pleural effusion. No acute bony abnormality. Aortic atherosclerosis. Impression: Cardiomegaly with prominent interstitial markings and mild central pulmonary venous congestion favoring low-grade interstitial edema from congestive heart failure. Left basilar subsegmental atelectasis. This document has been electronically signed by: Sylwia Coronado on 12/27/2022 06:55 AM    FEN/GI/DVT:  IVF: None  Electrolytes: Monitor and replace per protocols  Diet: General  GI PPX: Yes  DVT Prophylaxis: Lovenox    CODE STATUS:  Full    Thank you Shaun Mejia DO for the opportunity to be involved in this patient's care.     Electronically signed by Estrella Aranda MD PGY-3 IM on 12/27/2022 at 8:43 AM

## 2022-12-27 NOTE — ED TRIAGE NOTES
Pt presents to the ED from [address] [place] [address] after sustaining an unwitnessed fall earlier this morning. Pt states that she got up to go to the bathroom and fell down. Pt denies hitting her head and is not on any blood thinners. Pt has a hx of dementia and is able to tell me her name and where she is currently. Pt does not remember how she fell.  VSS, EKG obtained, Dr Mariann Law at bedside assessing patient at this time

## 2022-12-27 NOTE — ED NOTES
Pt provided mouth swobs at this time due to complaints of \"my mouth being dry\". Pt remains alert and oriented to normal mentation, respirations are equal and unlabored.  Pt denies needs at this time, family remains at bedside with patient at this time, call light in reach, will continue to monitor      Forth EDWINA gale  12/27/22 [id number]

## 2022-12-27 NOTE — ED NOTES
Pt appears to be comfortable, resting with eyes closed, respirations are equal and unlabored.  Pt denies needs at this time, will continue to monitor, call light in reach      DANG RITCHIEConemaugh Miners Medical Center  12/27/22 [id number]

## 2022-12-27 NOTE — ED PROVIDER NOTES
[address] [facility] [address] [address] EMERGENCY DEPT      EMERGENCY MEDICINE     Pt Name: Aubree Manzo  MRN: [id number]  [place] 5/25/1930  Date of evaluation: 12/27/2022  Provider: Della Waldron MD    CHIEF COMPLAINT       Chief Complaint   Patient presents with    Fall     HISTORY OF PRESENT ILLNESS   Aubree Manzo is a pleasant 80 y.o. female who presents to the emergency department from nursing home  EMS with chief complaint of unwitnessed fall. Patient uses a walker to ambulate however was noted to have gone to the bathroom without her walker. The fall was unwitnessed however was heard by staff and they responded. There was no reported loss of consciousness. Patient has dementia and complains of left lower extremity pain however denies any other pain. The history and review of systems are significantly limited secondary to the underlying severity of the dementia.     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    [place] (hard of hearing) H91.90    Bradycardia R00.1    Left arm pain M79.602    Dyspnea R06.00    Status post left knee replacement Z96.652    Palpitations R00.2    Memory loss R41.3    Late onset Alzheimer's disease without behavioral disturbance (HCC) G30.1, F02.80     SURGICAL HISTORY       Past Surgical History:   Procedure Laterality Date    BLADDER SUSPENSION  1968    FOOT SURGERY Right 1970s    HYSTERECTOMY (CERVIX STATUS UNKNOWN)  1968    JOINT REPLACEMENT Left     left knee    JOINT REPLACEMENT Right     hip    MOHS SURGERY  1/22/14    NOSE    GA OFFICE/OUTPT VISIT,PROCEDURE ONLY N/A 9/19/2018    MOHS DEFECT REPAIR BCC DORSUM OF NOSE performed by Sun Arevalo MD at [address]      small blockage-adhesion       CURRENT MEDICATIONS       Previous Medications    ACETAMINOPHEN (TYLENOL) 325 MG TABLET    Take 650 mg by mouth every 6 hours as needed for Pain    CALCIUM CARBONATE-VITAMIN D (CALCIUM 600 + D) 600-400 MG-UNIT TABS PER TAB    Take 1 tablet by mouth 2 times daily    DONEPEZIL (ARICEPT) 5 MG TABLET    take 1 tablet by mouth ONCE NIGHTLY. ELASTIC BANDAGES & SUPPORTS (T.E.D. ANTI-EMBOLISM STOCKINGS) MISC    Knee high SAMMY hose. Please measure pt for appropriate size. Wear during daytime, take off at night. HANDICAP PLACARD MISC    by Does not apply route Expires 10/30/25    HANDICAP PLACARD MISC    by Does not apply route Expires 27    MIRABEGRON (MYRBETRIQ) 50 MG TB24    Take 50 mg by mouth daily    MISC. DEVICES (COMMODE BEDSIDE) MISC    Use as directed    MISC. DEVICES (WALKER) MISC    Wheeled walker with seat, use as directed    MISC. DEVICES (WHEELCHAIR) MISC    Use as directed    MULTIPLE VITAMINS-MINERALS (THERAPEUTIC MULTIVITAMIN-MINERALS) TABLET    Take 1 tablet by mouth daily    PSYLLIUM (METAMUCIL) 28.3 % POWD    1 tablespoon in 8 oz water daily by mouth    TRAZODONE (DESYREL) 50 MG TABLET    Take 0.5 tablets by mouth nightly as needed for Sleep    TRIAMCINOLONE (KENALOG) 0.025 % CREAM    apply to affected area OF LOWER LIP once daily       ALLERGIES     is allergic to sulfa antibiotics. FAMILY HISTORY     She indicated that her mother is . She indicated that her father is . She indicated that the status of her sister is unknown.        SOCIAL HISTORY       Social History     Tobacco Use    Smoking status: Never    Smokeless tobacco: Never   Substance Use Topics    Alcohol use: Yes     Comment: rare    Drug use: No       PHYSICAL EXAM       ED Triage Vitals [22 0215]   BP Temp Temp Source Heart Rate Resp SpO2 Height Weight   (!) 112/46 98 °F (36.7 °C) Oral (!) 104 16 96 % 5' (1.524 m) 158 lb (71.7 kg)       Physical Exam  Primary survey: Airway patent, no tracheal deviation. Breath sounds equal b/l, equal chest rise, no chest wall tenderness or crepitus. Pulses equal in all extremities, normotensive and normal heart rate, no gross hemorrhage. No gross focal neuro deficit, GCS 14 this is baseline). No gross abnormalities on exposure. Non-tender spinous processes and no step-offs appreciated. Normal rectal tone    Secondary survey: No scalp lacerations, skull step-offs or cephalohematoma. PEARLA. No hemotympanum or Ferreira signs. No septal hematoma. No jaw crepitus or oropharyngeal abnormality. No bruising or lacerations to neck. No contusion to chest. Abdomen soft, nontender, nondistended without Israel´s sign. Pelvis stable. No gross traumatic abnormalities to extremities. No tenderness to extremities. Restricted ROM of all extremities due to overall deconditioning. All extremities neurovascularly intact. Left lower extremity cellulitis. No crepitus. No calf tenderness. Negative Homans. FORMAL DIAGNOSTIC RESULTS     RADIOLOGY: Interpretation per the Radiologist below, if available at the time of this note (none if blank):    VL DUP LOWER EXTREMITY VENOUS LEFT   Final Result   Normal venous ultrasound. No evidence for acute deep venous thrombosis. **This report has been created using voice recognition software. It may contain minor errors which are inherent in voice recognition technology. **      Final report electronically signed by Dr. Kelli Prater on 12/27/2022 3:53 PM      CT HEAD WO CONTRAST   Final Result   Impression:   Mild motion limited examination. No acute intracranial pathology. Cerebral volume loss with chronic microvascular gliosis in the    periventricular white matter. This document has been electronically signed by: Kendall Smiley on    12/27/2022 05:09 AM      All CTs at this facility use dose modulation techniques and iterative    reconstructions, and/or weight-based dosing   when appropriate to reduce radiation to a low as reasonably achievable. CT CSpine W/O Contrast   Final Result   No CT evidence of acute cervical spinal injury. Multilevel chronic degenerative disc disease and arthropathy. This document has been electronically signed by: Facundo Smith on    12/27/2022 05:11 AM      All CTs at this facility use dose modulation techniques and iterative    reconstructions, and/or weight-based dosing   when appropriate to reduce radiation to a low as reasonably achievable. XR ANKLE RIGHT (MIN 3 VIEWS)   Final Result   No acute bony abnormality. This document has been electronically signed by: Facundo Smith on    12/27/2022 06:52 AM      XR CHEST PORTABLE   Final Result   Impression:   Cardiomegaly with prominent interstitial markings and mild central    pulmonary venous congestion favoring low-grade interstitial edema from    congestive heart failure. Left basilar subsegmental atelectasis. This document has been electronically signed by: Facundo Smith on    12/27/2022 06:55 AM      XR PELVIS (1-2 VIEWS)   Final Result      No acute bony abnormality. Chronic degenerative changes. Status post right hip arthroplasty. This document has been electronically signed by: Facundo Smith on    12/27/2022 06:50 AM      XR ANKLE LEFT (MIN 3 VIEWS)   Final Result   No acute bony abnormality. This document has been electronically signed by: Facundo Smith on    12/27/2022 06:57 AM          LABS: (none if blank)  Labs Reviewed   CBC WITH AUTO DIFFERENTIAL - Abnormal; Notable for the following components:       Result Value    WBC 12.6 (*)     RBC 3.82 (*)     Hematocrit 36.7 (*)     RDW-CV 15.7 (*)     RDW-SD 55.4 (*)     Segs Absolute 10.8 (*)     Lymphocytes Absolute 0.3 (*)     Immature Grans (Abs) 0.14 (*)     All other components within normal limits   BASIC METABOLIC PANEL - Abnormal; Notable for the following components:    Sodium 134 (*)     Chloride 97 (*)     CO2 21 (*)     Glucose 140 (*)     All other components within normal limits   HEPATIC FUNCTION PANEL - Abnormal; Notable for the following components:    Alkaline Phosphatase 134 (*)     All other components within normal limits   LACTIC ACID - Abnormal; Notable for the following components:    Lactic Acid 4.2 (*)     All other components within normal limits   URINE WITH REFLEXED MICRO - Abnormal; Notable for the following components:    Nitrite, Urine POSITIVE (*)     Leukocyte Esterase, Urine LARGE (*)     Character, Urine CLOUDY (*)     All other components within normal limits   OSMOLALITY - Abnormal; Notable for the following components:    Osmolality Calc 271.4 (*)     All other components within normal limits   CULTURE, BLOOD 1   CULTURE, BLOOD 1   CULTURE, REFLEXED, URINE   ANION GAP   GLOMERULAR FILTRATION RATE, ESTIMATED   SCAN OF BLOOD SMEAR   CBC   BASIC METABOLIC PANEL       (Any cultures that may have been sent were not resulted at the time of this patient visit)    [facility] / ED COURSE:     1) Number and Complexity of Problems            Problem List This Visit:         Chief Complaint   Patient presents with    Fall            Differential Diagnosis includes (but not limited to):  ICH, CVA, traumatic injuries, infectious process, electrolyte abnormalities, ACS        Diagnoses Considered but I have low suspicion of:   Aortic dissection, pulmonary embolism             Pertinent Comorbid Conditions:    Severe dementia    2)  Data Reviewed (none if left blank)          My Independent interpretations:     EKG:      Normal sinus rhythm, indeterminate axis, poor R wave progression, normal intervals, no ST elevations or depressions    Imaging: Negative CT brain    Labs:      Urinary tract infection, leukocytosis                 Decision Rules/Clinical Scores utilized: [place] head CT rules, Nexus criteria            External Documentation Reviewed:         Previous patient encounter documents & history available on EMR was reviewed              See Formal Diagnostic Results above for the lab and radiology tests and orders. 3)  Treatment and Disposition         ED Reassessment: See MDM below         Case discussed with consulting clinician: Admitting hospitalist         Shared Decision-Making was performed and disposition discussed with the        Patient/Family and questions answered          Social determinants of health impacting treatment or disposition: Nursing home patient        Summary of Patient Presentation:      MDM  /  ED Course as of 12/27/22 2059   Tue Dec 27, 2022   0652 No notable traumatic injury identified. Tachycardic however with otherwise normal vital signs on arrival.  Laboratory eval concerning for severe sepsis secondary to a urinary tract infection. Physical examination notable for left lower extremity cellulitis. Patient's blood pressure decreased while in the emergency department however she responded to 30 cc/kg IV fluid bolus resuscitation. CT brain and C-spine negative for ICH. I reviewed the x-ray images myself and did not see any evidence for acute fracture however the official read is delayed. IV Rocephin initiated. Hypotension resolved with fluid resuscitation. Admit to medicine [AM]      ED Course User Index  [AM] Estefany Cason MD     Vitals Reviewed:    Vitals:    12/27/22 0215   BP: (!) 112/46   Pulse: (!) 104   Resp: 16   Temp: 98 °F (36.7 °C)   TempSrc: Oral   SpO2: 96%   Weight: 158 lb (71.7 kg)   Height: 5' (1.524 m)       The patient was seen and examined. Appropriate diagnostic testing was performed and results reviewed with the patient. The results of pertinent diagnostic studies and exam findings were discussed.  The patients provisional diagnosis and plan of care were discussed with the patient and present family who expressed understanding. Any medications were reviewed and indications and risks of medications were discussed with the patient /family present.       ED Medications administered this visit:  (None if blank)  Medications   sodium chloride flush 0.9 % injection 5-40 mL (10 mLs IntraVENous Given 12/27/22 1026)   sodium chloride flush 0.9 % injection 5-40 mL (has no administration in time range)   0.9 % sodium chloride infusion (has no administration in time range)   enoxaparin (LOVENOX) injection 40 mg (40 mg SubCUTAneous Given 12/27/22 1026)   ondansetron (ZOFRAN-ODT) disintegrating tablet 4 mg (has no administration in time range)     Or   ondansetron (ZOFRAN) injection 4 mg (has no administration in time range)   polyethylene glycol (GLYCOLAX) packet 17 g (has no administration in time range)   acetaminophen (TYLENOL) tablet 650 mg (has no administration in time range)     Or   acetaminophen (TYLENOL) suppository 650 mg (has no administration in time range)   calcium-cholecalciferol 500-5 MG-MCG per tablet 1 tablet (1 tablet Oral Given 12/27/22 1140)   donepezil (ARICEPT) tablet 5 mg (has no administration in time range)   traZODone (DESYREL) tablet 25 mg (has no administration in time range)   cefTRIAXone (ROCEPHIN) 1,000 mg in dextrose 5 % 50 mL IVPB mini-bag (has no administration in time range)   0.9 % sodium chloride infusion ( IntraVENous New Bag 12/27/22 1028)   cefTRIAXone (ROCEPHIN) 1,000 mg in dextrose 5 % 50 mL IVPB mini-bag (0 mg IntraVENous Stopped 12/27/22 0420)   lactated ringers bolus (0 mLs IntraVENous Stopped 12/27/22 0526)   fentaNYL (SUBLIMAZE) injection 25 mcg (25 mcg IntraVENous Given 12/27/22 0327)   lactated ringers bolus (0 mLs IntraVENous Stopped 12/27/22 0526)   fentaNYL (SUBLIMAZE) injection 25 mcg (25 mcg IntraVENous Given 12/27/22 0601)   lactated ringers infusion ( IntraVENous New Bag 12/27/22 0608)         PROCEDURES: (None if blank)  Procedures:     CRITICAL CARE: (None if blank)      DISCHARGE PRESCRIPTIONS: (None if blank)  New Prescriptions    No medications on file       FINAL IMPRESSION      1. Septic shock ([facility] [address]    2.  Acute cystitis without hematuria          DISPOSITION/PLAN   DISPOSITION    Admission      MD Jerrica Bautista MD  12/27/22 7177

## 2022-12-27 NOTE — ED NOTES
ED nurse-to-nurse bedside report    Chief Complaint   Patient presents with    Fall      LOC: alert to only name  Vital signs   Vitals:    12/27/22 0618 12/27/22 0628 12/27/22 0638 12/27/22 0708   BP: (!) 101/51 (!) 99/49 (!) 102/51 (!) 116/54   Pulse: 83 82 81 81   Resp: 23 19 20 (!) 33   Temp:       TempSrc:       SpO2: 90% 92% 92% 94%   Weight:       Height:          Pain:    Pain Interventions: fentanyl  Pain Goal: 4  Oxygen: No    Current needs required none   Telemetry: Yes  LDAs:   Peripheral IV 07/13/20 Left Antecubital (Active)       Peripheral IV 12/27/22 Left Antecubital (Active)   Site Assessment Clean, dry & intact 12/27/22 0239   Phlebitis Assessment No symptoms 12/27/22 0239   Infiltration Assessment 0 12/27/22 0239       Peripheral IV 12/27/22 Left Forearm (Active)   Site Assessment Clean, dry & intact 12/27/22 0239     Continuous Infusions:   Mobility: Requires assistance * 2  Patino Fall Risk Score:    Fall Risk 4/28/2022 8/24/2021 1/29/2020 1/15/2020 10/29/2018 8/17/2017 11/8/2016   2 or more falls in past year? no yes no no no no no   Fall with injury in past year? no no no no yes no no     Fall Interventions: side rails raised, call light in reach, reoriented to surroundings  Report given to: [facility]  12/27/22 9355

## 2022-12-27 NOTE — PROGRESS NOTES
0915-Patient arrived to unit from ED via stretcher. Patient is on RA. Patients daughter, Josi Khanna called and she spoke with myself and patient. Orientated patient to room, patient denies any needs at this time.

## 2022-12-27 NOTE — ED NOTES
Pt resting on cot, pt alert and oriented to patient normal mentation, respirations are equal and unlabored. Pt denies needs at this time, family remains at bedside, waiting for final xray reads at this time.       [facility]  12/27/22 5022

## 2022-12-28 LAB
ANION GAP SERPL CALCULATED.3IONS-SCNC: 10 MEQ/L (ref 8–16)
BUN BLDV-MCNC: 16 MG/DL (ref 7–22)
CALCIUM SERPL-MCNC: 8.1 MG/DL (ref 8.5–10.5)
CHLORIDE BLD-SCNC: 101 MEQ/L (ref 98–111)
CO2: 23 MEQ/L (ref 23–33)
CREAT SERPL-MCNC: 0.6 MG/DL (ref 0.4–1.2)
ERYTHROCYTE [DISTWIDTH] IN BLOOD BY AUTOMATED COUNT: 15.9 % (ref 11.5–14.5)
ERYTHROCYTE [DISTWIDTH] IN BLOOD BY AUTOMATED COUNT: 55.5 FL (ref 35–45)
GFR SERPL CREATININE-BSD FRML MDRD: > 60 ML/MIN/1.73M2
GLUCOSE BLD-MCNC: 98 MG/DL (ref 70–108)
HCT VFR BLD CALC: 30.3 % (ref 37–47)
HEMOGLOBIN: 10 GM/DL (ref 12–16)
MCH RBC QN AUTO: 31.3 PG (ref 26–33)
MCHC RBC AUTO-ENTMCNC: 33 GM/DL (ref 32.2–35.5)
MCV RBC AUTO: 95 FL (ref 81–99)
ORGANISM: ABNORMAL
PLATELET # BLD: 185 THOU/MM3 (ref 130–400)
PMV BLD AUTO: 10.1 FL (ref 9.4–12.4)
POTASSIUM SERPL-SCNC: 3.6 MEQ/L (ref 3.5–5.2)
RBC # BLD: 3.19 MILL/MM3 (ref 4.2–5.4)
SODIUM BLD-SCNC: 134 MEQ/L (ref 135–145)
URINE CULTURE REFLEX: ABNORMAL
WBC # BLD: 16.4 THOU/MM3 (ref 4.8–10.8)

## 2022-12-28 PROCEDURE — 2700000000 HC OXYGEN THERAPY PER DAY

## 2022-12-28 PROCEDURE — 97166 OT EVAL MOD COMPLEX 45 MIN: CPT

## 2022-12-28 PROCEDURE — 97162 PT EVAL MOD COMPLEX 30 MIN: CPT

## 2022-12-28 PROCEDURE — 6360000002 HC RX W HCPCS: Performed by: STUDENT IN AN ORGANIZED HEALTH CARE EDUCATION/TRAINING PROGRAM

## 2022-12-28 PROCEDURE — 1200000003 HC TELEMETRY R&B

## 2022-12-28 PROCEDURE — 6370000000 HC RX 637 (ALT 250 FOR IP): Performed by: STUDENT IN AN ORGANIZED HEALTH CARE EDUCATION/TRAINING PROGRAM

## 2022-12-28 PROCEDURE — 97110 THERAPEUTIC EXERCISES: CPT

## 2022-12-28 PROCEDURE — 94761 N-INVAS EAR/PLS OXIMETRY MLT: CPT

## 2022-12-28 PROCEDURE — 36415 COLL VENOUS BLD VENIPUNCTURE: CPT

## 2022-12-28 PROCEDURE — 97530 THERAPEUTIC ACTIVITIES: CPT

## 2022-12-28 PROCEDURE — 2580000003 HC RX 258: Performed by: STUDENT IN AN ORGANIZED HEALTH CARE EDUCATION/TRAINING PROGRAM

## 2022-12-28 PROCEDURE — 80048 BASIC METABOLIC PNL TOTAL CA: CPT

## 2022-12-28 PROCEDURE — 99233 SBSQ HOSP IP/OBS HIGH 50: CPT | Performed by: INTERNAL MEDICINE

## 2022-12-28 PROCEDURE — 85027 COMPLETE CBC AUTOMATED: CPT

## 2022-12-28 RX ADMIN — SODIUM CHLORIDE, PRESERVATIVE FREE 10 ML: 5 INJECTION INTRAVENOUS at 23:34

## 2022-12-28 RX ADMIN — CEFTRIAXONE SODIUM 1000 MG: 1 INJECTION, POWDER, FOR SOLUTION INTRAMUSCULAR; INTRAVENOUS at 11:01

## 2022-12-28 RX ADMIN — DONEPEZIL HYDROCHLORIDE 5 MG: 5 TABLET, FILM COATED ORAL at 23:33

## 2022-12-28 RX ADMIN — ENOXAPARIN SODIUM 40 MG: 100 INJECTION SUBCUTANEOUS at 11:02

## 2022-12-28 RX ADMIN — Medication 1 TABLET: at 11:02

## 2022-12-28 RX ADMIN — SODIUM CHLORIDE, PRESERVATIVE FREE 10 ML: 5 INJECTION INTRAVENOUS at 11:03

## 2022-12-28 RX ADMIN — Medication 1 TABLET: at 23:33

## 2022-12-28 NOTE — PLAN OF CARE
Problem: Discharge Planning  Goal: Discharge to home or other facility with appropriate resources  12/28/2022 1158 by Daisy Sanabria RN  Outcome: Progressing  Flowsheets (Taken 12/28/2022 1055)  Discharge to home or other facility with appropriate resources:   Identify barriers to discharge with patient and caregiver   Arrange for needed discharge resources and transportation as appropriate   Identify discharge learning needs (meds, wound care, etc)     Problem: Pain  Goal: Verbalizes/displays adequate comfort level or baseline comfort level  12/28/2022 1158 by Daisy Sanabria RN  Outcome: Progressing  Flowsheets (Taken 12/28/2022 1045)  Verbalizes/displays adequate comfort level or baseline comfort level:   Encourage patient to monitor pain and request assistance   Assess pain using appropriate pain scale   Administer analgesics based on type and severity of pain and evaluate response   Implement non-pharmacological measures as appropriate and evaluate response   Consider cultural and social influences on pain and pain management     Problem: Safety - Adult  Goal: Free from fall injury  12/28/2022 1158 by Daisy Sanabria RN  Outcome: Progressing     Problem: ABCDS Injury Assessment  Goal: Absence of physical injury  12/28/2022 1158 by Daisy Sanabria RN  Outcome: Progressing     Problem: Skin/Tissue Integrity  Goal: Absence of new skin breakdown  Description: 1. Monitor for areas of redness and/or skin breakdown  2. Assess vascular access sites hourly  3. Every 4-6 hours minimum:  Change oxygen saturation probe site  4. Every 4-6 hours:  If on nasal continuous positive airway pressure, respiratory therapy assess nares and determine need for appliance change or resting period.   12/28/2022 1158 by Daisy Sanabria RN  Outcome: Progressing     Problem: Chronic Conditions and Co-morbidities  Goal: Patient's chronic conditions and co-morbidity symptoms are monitored and maintained or improved  Outcome: Progressing  Flowsheets (Taken 12/28/2022 1055)  Care Plan - Patient's Chronic Conditions and Co-Morbidity Symptoms are Monitored and Maintained or Improved:   Monitor and assess patient's chronic conditions and comorbid symptoms for stability, deterioration, or improvement   Collaborate with multidisciplinary team to address chronic and comorbid conditions and prevent exacerbation or deterioration   Update acute care plan with appropriate goals if chronic or comorbid symptoms are exacerbated and prevent overall improvement and discharge   Care plan reviewed with patient and family. Patient and family verbalize understanding of the plan of care and contribute to goal setting.

## 2022-12-28 NOTE — PLAN OF CARE
Problem: Discharge Planning  Goal: Discharge to home or other facility with appropriate resources  Outcome: Progressing  Flowsheets (Taken 12/27/2022 2200)  Discharge to home or other facility with appropriate resources: Identify barriers to discharge with patient and caregiver     Problem: Pain  Goal: Verbalizes/displays adequate comfort level or baseline comfort level  Outcome: Progressing  Flowsheets (Taken 12/27/2022 2200)  Verbalizes/displays adequate comfort level or baseline comfort level:   Assess pain using appropriate pain scale   Encourage patient to monitor pain and request assistance     Problem: Safety - Adult  Goal: Free from fall injury  Outcome: Progressing  Flowsheets (Taken 12/27/2022 2200)  Free From Fall Injury: Based on caregiver fall risk screen, instruct family/caregiver to ask for assistance with transferring infant if caregiver noted to have fall risk factors     Problem: ABCDS Injury Assessment  Goal: Absence of physical injury  Outcome: Progressing     Problem: Skin/Tissue Integrity  Goal: Absence of new skin breakdown  Description: 1. Monitor for areas of redness and/or skin breakdown  2. Assess vascular access sites hourly  3. Every 4-6 hours minimum:  Change oxygen saturation probe site  4. Every 4-6 hours:  If on nasal continuous positive airway pressure, respiratory therapy assess nares and determine need for appliance change or resting period.   Outcome: Progressing

## 2022-12-28 NOTE — PROGRESS NOTES
Clementine Douglas 60  INPATIENT OCCUPATIONAL THERAPY  [facility] MED SURG 8A  EVALUATION    Time:   Time In:   Time Out: 1126  Timed Code Treatment Minutes: 9 Minutes  Minutes: 18          Date: 2022  Patient Name: Chandan Stewart,   Gender: female      MRN: [id number]  : 1930  (80 y.o.)  Referring Practitioner: Rosa Schulz MD  Diagnosis: 80 y.o. female who presented to [facility] from nursing home for evaluation of unwitnessed fall. PMHx significant for dementia. History obtained by chart review, patient unable to give me any history at this time. patient uses a walker to ambulate but did not use it at the time of fall. The fall was unwitnessed but staff heard her fall and they responded. There was no loss of consciousness, patient did not hit her head. Patient was complaining of left lower extremity on presentation. Patient is hard of hearing. She does deny chest pain or shortness of breath. Unable to tell me where she is at, which she is aware to self. She does report burning on urination. Restrictions/Precautions:  Restrictions/Precautions: General Precautions, Fall Risk    Subjective  Chart Reviewed: Yes, Orders, Progress Notes  Patient assessed for rehabilitation services?: Yes    Subjective: RN okayed session. Pt agreeable to OT. Pt pleasantly confused throughout session, telling the same stories/asking the same questions. Pt disoriented to place, time, situation. Pain: Denies pain    Vitals: Vitals not assessed per clinical judgement, see nursing flowsheet    Social/Functional History:  Type of Home: Facility  Home Layout: One level  Home Access: Level entry           ADL Assistance: Independent  Homemaking Assistance: Needs assistance  Ambulation Assistance: Independent  Transfer Assistance: Independent          Additional Comments: Per pt report, she was getting up and walking with a walker independently.  Pt reports she was completing ADLs independently. Required A for IADLs. Questionable historian. VISION:WFL    HEARING:   very [place]    COGNITION: Slow Processing, Decreased Recall, Decreased Insight, Impaired Memory, Decreased Problem Solving, and Decreased Safety Awareness    RANGE OF MOTION:  Bilateral Upper Extremity:  WFL    STRENGTH:  Bilateral Upper Extremity:  grossly 3+/5    SENSATION:   WFL    ADL:   No ADL's completed this session. Pt had just completed toileting routine. [place] BALANCE:  Sitting Balance:  Stand By Assistance. Standing Balance: Minimal Assistance. With BUE support. BED MOBILITY:  Not Tested    TRANSFERS:  Sit to Stand:  Minimal Assistance. From recliner, VC for safety required. Stand to Sit: Minimal Assistance. FUNCTIONAL MOBILITY:  Assistive Device: Rolling Walker  Assist Level:  Minimal Assistance. Distance:  5' forward and retro in room. Pt completed at very slow pace with small shuffling steps, pt's walker noted to be too high; however, pt reported she likes the height \"it's good for her\" - declining for OT to change height. Pt also noted to push walker away from body from middle krishan. VC for safety. Activity Tolerance:  Patient tolerance of  treatment: good. Assessment:  Assessment: Pt presented with the listed deficits s/p fall. Pt with decreased cognition, balance, endurance, and activity tolerance and currently requires increased A for ADLs and IADLs. Pt would benefit from continued OT to restore PLOF maximize pt's indep with ADLs and IADLs in prep for a safe return home at max level of indep. Performance deficits / Impairments: Decreased functional mobility , Decreased endurance, Decreased posture, Decreased ADL status, Decreased balance, Decreased strength, Decreased safe awareness, Decreased high-level IADLs  Prognosis: Good  REQUIRES OT FOLLOW-UP: Yes  Decision Making: Medium Complexity    Treatment Initiated: Treatment and education initiated within context of evaluation. Evaluation time included review of current medical information, gathering information related to past medical, social and functional history, completion of standardized testing, formal and informal observation of tasks, assessment of data and development of plan of care and goals. Treatment time included skilled education and facilitation of tasks to increase safety and independence with ADL's for improved functional independence and quality of life. Discharge Recommendations:  Subacute/Skilled Nursing Facility    Patient Education:     Patient Education  Education Given To: Patient  Education Provided: Role of Therapy, Plan of Care, ADL Adaptive Strategies, Transfer Training, IADL Safety, Orientation  Education Method: Demonstration  Barriers to Learning: Cognition, Hearing    Equipment Recommendations:  Equipment Needed: No  Other: defer to next level of care    Plan:  Times Per Week: 3-5x  Current Treatment Recommendations: Strengthening, Balance training, Functional mobility training, Endurance training, Safety education & training, Self-Care / ADL. See long-term goal time frame for expected duration of plan of care. If no long-term goals established, a short length of stay is anticipated. Goals:  Patient goals : not stated  Short Term Goals  Time Frame for Short Term Goals: by discharge  Short Term Goal 1: Pt will safely navigate short distances with CGA and no LOB or safety cues to increase indep with ADLs  Short Term Goal 2: Pt will tolerate dynamic standing x5min with CGA and B hand release to increase indep with grooming  Short Term Goal 3: Pt will complete toileting routine, including t/f, with CGA and min VC for safety to increase indep with self care         Following session, patient left in safe position with all fall risk precautions in place.

## 2022-12-28 NOTE — PROGRESS NOTES
Hospitalist note       Patient: Ariel Middleton  YOB: 1930    MRN: [id number]     Acct: [de-identified]    PCP: Toño Christy DO    Date of Admission: 12/27/2022    Date of Service: Patient seen / examined on 12/28/22 and admitted to inpatient with expected LOS greater than two midnights due to medical therapy. ASSESSMENT / PLAN:    Sepsis -meets 3/4 SIRS criteria (elevated HR > 90, elevated RR > 20, elevated WBC) + UTI as source of infection. Lactic acid elevated. S/p 2L IVF. S/p Rocephin 1 g x1  Plan -await urine culture, continue Rocephin every 24 hours, pending culture results to narrow down antibiotic coverage. Will initiate gentle IV fluid hydration at 50 cc/h for 12 hours as patient has evidence of cardiomegaly with prominent interstitial markings and mild central pulmonary venous congestion favoring low-grade interstitial edema from congestive heart failure on chest x-ray. Will reassess fluid status. Urinary tract infection -UA positive for UTI (elevated WBC, positive nitrate, +leukocyte esterase, +bacteria), pending culture. Symptomatic. Continue Rocephin daily. Pulmonary vascular congestion -seen on chest x-ray. Patient does not have history of heart failure, last echo 8/2017 EF 60%. Patient is not hypoxic, on room air with SPO2 above 90%. Patient does not appear fluid overloaded on physical exam. She received 2 L of IV fluids for treatment of sepsis/dehydration. Plan -we will obtain full echo to evaluate heart structure and function. Gentle IV fluid hydration for 12 hours and then reassess fluid status. Daily weights. Strict intake and output. Hyponatremia, mild -sodium level 134, serum osmolality 271. Secondary to dehydration. Continue to monitor sodium level with daily BMP    Unwitnessed fall -no loss of consciousness/head trauma/fractures. Patient uses a walker at baseline but did not use her walker while ambulating to the bathroom. Consult PT OT. Dementia -resume Aricept 5 mg daily    LLE cellulitis will monitor patient on Rocephin for urinary tract infection. Chief Complaint:  Fall     History of Present Illness:  80 y.o. female who presented to [facility] from nursing home for evaluation of unwitnessed fall. PMHx significant for dementia. History obtained by chart review, patient unable to give me any history at this time. patient uses a walker to ambulate but did not use it at the time of fall. The fall was unwitnessed but staff heard her fall and they responded. There was no loss of consciousness, patient did not hit her head. Patient was complaining of left lower extremity on presentation. Patient is hard of hearing. She does deny chest pain or shortness of breath. Unable to tell me where she is at, which she is aware to self. She does report burning on urination. Initial vital signs reveal temp 98, respiratory rate 16, heart rate 104, blood pressure 112/46, SPO2 96%. Lab work significant for sodium 134, chloride 97, bicarb 21, lactic acid 4.2, osmolality 271, WBC 12.6. Urinalysis positive for UTI. X-ray of left ankle no acute fractures. X-ray right ankle no acute fractures. X-ray of pelvis, no acute fractures. Chest x-ray reveals cardiomegaly with prominent interstitial markings and mild central pulmonary venous congestion favoring low-grade interstitial edema from congestive heart failure. Left basilar subsegmental atelectasis. CT head reveals no acute intracranial abnormalities, central volume loss with chronic microvascular gliosis in the periventricular white matter. CT spine no acute cervical spinal injury. Patient was treated with Rocephin 1 g x 1,  s/p 2 L IV fluid    Patient admitted under hospitalist service for further management.      Hospital course  12.28.2022 patient seen this a.m. severely demented urine culture grew enteric gram-negative bacilli greater than 100,000 colony-forming units per mL currently on Rocephin. Sodium 134 creatinine 0.6, WBC 16.4, hemoglobin 10.0, lakelets 185. We will continue to monitor      Past Medical History:    Past Medical History:   Diagnosis Date    Arthritis     Cancer ([address]     skin    Colitis     Colon polyps     Fatty liver     resolved per patient    Hearing loss of both ears     Hyperlipidemia     Varicose vein of leg     Varicosity      Past Surgical History:    Past Surgical History:   Procedure Laterality Date    BLADDER SUSPENSION  1968    FOOT SURGERY Right 1970s    HYSTERECTOMY (CERVIX STATUS UNKNOWN)  1968    JOINT REPLACEMENT Left     left knee    JOINT REPLACEMENT Right     hip    MOHS SURGERY  1/22/14    NOSE    MT OFFICE/OUTPT VISIT,PROCEDURE ONLY N/A 9/19/2018    MOHS DEFECT REPAIR BCC DORSUM OF NOSE performed by Cece Palomares MD at [facility]  2008    small blockage-adhesion      Medications Prior to Admission:   No current facility-administered medications on file prior to encounter. Current Outpatient Medications on File Prior to Encounter   Medication Sig Dispense Refill    Misc. Devices (COMMODE BEDSIDE) MISC Use as directed 1 each 0    Misc. Devices [facility]) MISC Use as directed 1 each 0    Handicap Placard MISC by Does not apply route Expires 4/28/27 1 each 0    triamcinolone (KENALOG) 0.025 % cream apply to affected area OF LOWER LIP once daily      Elastic Bandages & Supports (T.E.D. ANTI-EMBOLISM STOCKINGS) MISC Knee high SAMMY hose. Please measure pt for appropriate size. Wear during daytime, take off at night. 1 each 1    Handicap Placard MISC by Does not apply route Expires 10/30/25 1 each 0    Misc.  Devices (WALKER) MISC Wheeled walker with seat, use as directed 1 each 0    acetaminophen (TYLENOL) 325 MG tablet Take 650 mg by mouth every 6 hours as needed for Pain      calcium carbonate-vitamin D (CALCIUM 600 + D) 600-400 MG-UNIT TABS per tab Take 1 tablet by mouth 2 times daily 60 tablet 5    Multiple Vitamins-Minerals (THERAPEUTIC MULTIVITAMIN-MINERALS) tablet Take 1 tablet by mouth daily 30 tablet 11    traZODone (DESYREL) 50 MG tablet Take 0.5 tablets by mouth nightly as needed for Sleep 15 tablet 5    mirabegron (MYRBETRIQ) 50 MG TB24 Take 50 mg by mouth daily 30 tablet 3    donepezil (ARICEPT) 5 MG tablet take 1 tablet by mouth ONCE NIGHTLY. 90 tablet 3    Psyllium (METAMUCIL) 28.3 % POWD 1 tablespoon in 8 oz water daily by mouth 1365 g 3     Allergies:   Sulfa antibiotics    Social History:   Social History     Socioeconomic History    Marital status:       Spouse name: Not on file    Number of children: Not on file    Years of education: Not on file    Highest education level: Not on file   Occupational History    Not on file   Tobacco Use    Smoking status: Never    Smokeless tobacco: Never   Substance and Sexual Activity    Alcohol use: Yes     Comment: rare    Drug use: No    Sexual activity: Not Currently   Other Topics Concern    Not on file   Social History Narrative    Not on file     Social Determinants of Health     Financial Resource Strain: Not on file   Food Insecurity: Not on file   Transportation Needs: Not on file   Physical Activity: Inactive    Days of Exercise per Week: 0 days    Minutes of Exercise per Session: 0 min   Stress: Not on file   Social Connections: Not on file   Intimate Partner Violence: Not on file   Housing Stability: Not on file     Family History:    Family History   Problem Relation Age of Onset    Arthritis Mother     Arthritis Father     Cancer Father         lung    Stroke Father     Diabetes Sister      REVIEW OF SYSTEMS:    Unable to obtain    PHYSICAL EXAM:  Vitals:    12/27/22 2200 12/27/22 2211 12/28/22 0452 12/28/22 1045   BP: (!) 116/49  113/60 (!) 126/58   Pulse: 88  88 90   Resp: 16  22 20   Temp: 99.4 °F (37.4 °C)  98.2 °F (36.8 °C) 98.1 °F (36.7 °C)   TempSrc: Oral  Oral Oral   SpO2: 98% 96% 95% 95%   Weight:       Height: General appearance: Chronically ill-appearing female  HEENT:  Normocephalic / atraumatic. PERRL. EOM intact. Conjunctivae appear normal.  Neck: Supple. No JVD. Respiratory: Normal respiratory effort on RA. Cardiovascular: RRR. Abdomen: Soft / non-tender / non-distended. BS present. Musculoskeletal: No cyanosis or edema. Skin: Warm / dry. Normal turgor. Neurologic: A/O x 1 (self). Psychiatric: Unable to evaluate    Labs:   Results for orders placed or performed during the hospital encounter of 12/27/22   Blood Culture 1    Specimen: Blood   Result Value Ref Range    Blood Culture, Routine No growth 24 hours. Blood Culture 2    Specimen: Blood   Result Value Ref Range    Blood Culture, Routine No growth 24 hours.     Culture, Reflexed, Urine    Specimen: Urine, catheter   Result Value Ref Range    Organism enteric gram negative bacilli (A)     Urine Culture Reflex [place] count: >100,000 CFU/mL    CBC with Auto Differential   Result Value Ref Range    WBC 12.6 (H) 4.8 - 10.8 thou/mm3    RBC 3.82 (L) 4.20 - 5.40 mill/mm3    Hemoglobin 12.1 12.0 - 16.0 gm/dl    Hematocrit 36.7 (L) 37.0 - 47.0 %    MCV 96.1 81.0 - 99.0 fL    MCH 31.7 26.0 - 33.0 pg    MCHC 33.0 32.2 - 35.5 gm/dl    RDW-CV 15.7 (H) 11.5 - 14.5 %    RDW-SD 55.4 (H) 35.0 - 45.0 fL    Platelets 359 595 - 139 thou/mm3    MPV 10.2 9.4 - 12.4 fL    Seg Neutrophils 85.4 %    Lymphocytes 2.6 %    Monocytes 10.7 %    Eosinophils 0.0 %    Basophils 0.2 %    Immature Granulocytes 1.1 %    Platelet Estimate ADEQUATE Adequate    Segs Absolute 10.8 (H) 1.8 - 7.7 thou/mm3    Lymphocytes Absolute 0.3 (L) 1.0 - 4.8 thou/mm3    Monocytes Absolute 1.3 0.4 - 1.3 thou/mm3    Eosinophils Absolute 0.0 0.0 - 0.4 thou/mm3    Basophils Absolute 0.0 0.0 - 0.1 thou/mm3    Immature Grans (Abs) 0.14 (H) 0.00 - 0.07 thou/mm3    nRBC 0 /100 wbc   BMP   Result Value Ref Range    Sodium 134 (L) 135 - 145 meq/L    Potassium 3.6 3.5 - 5.2 meq/L    Chloride 97 (L) 98 - 111 meq/L    CO2 21 (L) 23 - 33 meq/L    Glucose 140 (H) 70 - 108 mg/dL    BUN 15 7 - 22 mg/dL    Creatinine 0.9 0.4 - 1.2 mg/dL    Calcium 8.9 8.5 - 10.5 mg/dL   Hepatic Function Panel   Result Value Ref Range    Albumin 3.8 3.5 - 5.1 g/dL    Total Bilirubin 0.5 0.3 - 1.2 mg/dL    Bilirubin, Direct <0.2 0.0 - 0.3 mg/dL    Alkaline Phosphatase 134 (H) 38 - 126 U/L    AST 31 5 - 40 U/L    ALT 25 11 - 66 U/L    Total Protein 6.8 6.1 - 8.0 g/dL   Lactic Acid   Result Value Ref Range    Lactic Acid 4.2 (H) 0.5 - 2.0 mmol/L   Urine with Reflexed Micro   Result Value Ref Range    Glucose, Ur NEGATIVE NEGATIVE mg/dl    Bilirubin Urine NEGATIVE NEGATIVE    Ketones, Urine NEGATIVE NEGATIVE    Specific Gravity, Urine 1.013 1.002 - 1.030    Blood, Urine NEGATIVE NEGATIVE    pH, UA 6.5 5.0 - 9.0    Protein, UA NEGATIVE NEGATIVE    Urobilinogen, Urine 0.2 0.0 - 1.0 eu/dl    Nitrite, Urine POSITIVE (A) NEGATIVE    Leukocyte Esterase, Urine LARGE (A) NEGATIVE    Color, UA YELLOW STRAW-YELLOW    Character, Urine CLOUDY (A) CLEAR-SL CLOUD    RBC, UA 0-2 0-2/hpf /hpf    WBC, UA > 200 0-4/hpf /hpf    Epithelial Cells, UA NONE SEEN 3-5/hpf /hpf    Bacteria, UA FEW FEW/NONE SEEN /hpf    Casts UA NONE SEEN NONE SEEN /lpf    Crystals, UA NONE SEEN NONE SEEN    Renal Epithelial, UA NONE SEEN NONE SEEN    Yeast, UA NONE SEEN NONE SEEN    CASTS 2 NONE SEEN NONE SEEN /lpf    MISCELLANEOUS 2 NONE SEEN    Anion Gap   Result Value Ref Range    Anion Gap 16.0 8.0 - 16.0 meq/L   Osmolality   Result Value Ref Range    Osmolality Calc 271.4 (L) 275.0 - 300.0 mOsmol/kg   Glomerular Filtration Rate, Estimated   Result Value Ref Range    Est, Glom Filt Rate 60 >60 ml/min/1.73m2   Scan of Blood Smear   Result Value Ref Range    SCAN OF BLOOD SMEAR see below    CBC   Result Value Ref Range    WBC 16.4 (H) 4.8 - 10.8 thou/mm3    RBC 3.19 (L) 4.20 - 5.40 mill/mm3    Hemoglobin 10.0 (L) 12.0 - 16.0 gm/dl    Hematocrit 30.3 (L) 37.0 - 47.0 %    MCV 95.0 81.0 - 99.0 fL    MCH 31.3 26.0 - 33.0 pg    MCHC 33.0 32.2 - 35.5 gm/dl    RDW-CV 15.9 (H) 11.5 - 14.5 %    RDW-SD 55.5 (H) 35.0 - 45.0 fL    Platelets 476 466 - 129 thou/mm3    MPV 10.1 9.4 - 12.4 fL   Basic Metabolic Panel   Result Value Ref Range    Sodium 134 (L) 135 - 145 meq/L    Potassium 3.6 3.5 - 5.2 meq/L    Chloride 101 98 - 111 meq/L    CO2 23 23 - 33 meq/L    Glucose 98 70 - 108 mg/dL    BUN 16 7 - 22 mg/dL    Creatinine 0.6 0.4 - 1.2 mg/dL    Calcium 8.1 (L) 8.5 - 10.5 mg/dL   Anion Gap   Result Value Ref Range    Anion Gap 10.0 8.0 - 16.0 meq/L   Glomerular Filtration Rate, Estimated   Result Value Ref Range    Est, Glom Filt Rate >60 >60 ml/min/1.73m2   POCT Glucose   Result Value Ref Range    POC Glucose 118 (H) 70 - 108 mg/dl   EKG 12 Lead   Result Value Ref Range    Ventricular Rate 104 BPM    Atrial Rate 104 BPM    P-R Interval 174 ms    QRS Duration 68 ms    Q-T Interval 308 ms    QTc Calculation (Bazett) 405 ms    P Axis 95 degrees    R Axis 9 degrees    T Axis 78 degrees       EKG / Radiology:     EKG:  Reviewed by me --    CXR:   Reviewed by me --    XR PELVIS (1-2 VIEWS)    Result Date: 12/27/2022  AP pelvis. Comparison: CT abdomen pelvis with contrast 09/14/2010. Findings: Right hip arthroplasty in anatomic alignment. No signs of prosthetic loosening. Severe left hip joint space narrowing with mild subarticular sclerosis and osteophytosis. Multilevel severe disc space narrowing with spondylotic spurring and facet arthropathy at L3-L4, L4-L5, and L5-S1. Mild levoscoliosis in the mid through lower lumbar spine. Sacroiliac joints are intact. No acute bony abnormality. Chronic degenerative changes. Status post right hip arthroplasty. This document has been electronically signed by: Claudia Joseph. [facility] on 12/27/2022 06:50 AM    XR ANKLE LEFT (MIN 3 VIEWS)    Result Date: 12/27/2022  Three-view left ankle. Comparison: None. Findings: Negative for acute fracture or dislocation.  No suspicious bony lesions. Equivocal small tibiotalar joint effusion. Diffuse soft tissue swelling. Posterior and plantar calcaneal spurs. Joint spaces are preserved. No acute bony abnormality. This document has been electronically signed by: Ab Khalilcecilio Thompsonen on 12/27/2022 06:57 AM    XR ANKLE RIGHT (MIN 3 VIEWS)    Result Date: 12/27/2022  3 views of the ankle. Comparison: None. Findings: Negative for acute fracture or dislocation. No suspicious bony lesions. Mild osteopenia. Soft tissue swelling. Minor posterior calcaneal enthesophyte. No significant detected joint effusion. Mild atherosclerosis. No acute bony abnormality. This document has been electronically signed by: Ab Dong on 12/27/2022 06:52 AM    CT HEAD WO CONTRAST    Result Date: 12/27/2022  CT head without contrast. CT head without contrast 01/20/2011. Technique: Unenhanced axial sections from the base to the vertex with multiplanar reformats. Findings: Mild motion artifact at multiple levels limits fine anatomic detail. Cerebral volume loss with reciprocal ventricular and subarachnoid enlargement. Low-density changes in the periventricular white matter compatible with chronic microvascular gliosis. Negative for intracranial hemorrhage, mass, or CT evidence of acute infarction. Bony structures are intact. No sinus fluid levels or mastoid opacification. Impression: Mild motion limited examination. No acute intracranial pathology. Cerebral volume loss with chronic microvascular gliosis in the periventricular white matter. This document has been electronically signed by: Ab Dong on 12/27/2022 05:09 AM All CTs at this facility use dose modulation techniques and iterative reconstructions, and/or weight-based dosing when appropriate to reduce radiation to a low as reasonably achievable. CT CSpine W/O Contrast    Result Date: 12/27/2022  CT cervical spine without contrast. Comparison: None.  Technique: Unenhanced axial sections with multiplanar reformats. Findings: Negative for acute cervical spine fracture or facet dislocation. The craniocervical junction is intact. Multilevel spondylotic disc disease and arthropathy. No critical central canal stenosis. Osteopenia. No CT evidence of acute cervical spinal injury. Multilevel chronic degenerative disc disease and arthropathy. This document has been electronically signed by: Sylwia Coronado on 12/27/2022 05:11 AM All CTs at this facility use dose modulation techniques and iterative reconstructions, and/or weight-based dosing when appropriate to reduce radiation to a low as reasonably achievable. XR CHEST PORTABLE    Result Date: 12/27/2022  Portable AP chest. Comparison: 01/21/2017 10:38 AM EST (09:38 AM CST) : CR: CHEST PA /T/ LAT Findings: Mild cardiomegaly. Central pulmonary venous congestion. Prominent interstitial markings bilaterally. Subsegmental atelectasis in the left lung base. No defined pleural effusion. No acute bony abnormality. Aortic atherosclerosis. Impression: Cardiomegaly with prominent interstitial markings and mild central pulmonary venous congestion favoring low-grade interstitial edema from congestive heart failure. Left basilar subsegmental atelectasis. This document has been electronically signed by: Sylwia Coronado on 12/27/2022 06:55 AM    FEN/GI/DVT:  IVF: None  Electrolytes: Monitor and replace per protocols  Diet: General  GI PPX: Yes  DVT Prophylaxis: Lovenox    CODE STATUS:  Full    Thank you Shaun Mejia DO for the opportunity to be involved in this patient's care.     Electronically signed by Karuna Barraza DO on 12/28/2022 at 11:39 AM

## 2022-12-28 NOTE — PROGRESS NOTES
Physician Progress Note      PATIENT:               Lis Garrido  CSN #:                  [id number]  :                       1930  ADMIT DATE:       2022 2:07 AM  DISCH DATE:  RESPONDING  PROVIDER #:        Krystina Nelson DO          QUERY TEXT:    Dr Rola Gibbons,    Pt admitted with Sepsis & UTI. Per ED physician pt also with Left Lower   extremity cellulitis. If possible, please document in progress notes and   discharge summary:    The medical record reflects the following:  Risk Factors: Sepsis with UTI  Clinical Indicators: Per ED physician: Left Lower extremity cellulitis. Treatment: IV Rocephin  Options provided:  -- Left Lower extremity cellulitis confirmed present on admission  -- Left Lower extremity cellulitis ruled out  -- Other - I will add my own diagnosis  -- Disagree - Not applicable / Not valid  -- Disagree - Clinically unable to determine / Unknown  -- Refer to Clinical Documentation Reviewer    PROVIDER RESPONSE TEXT:    The diagnosis of Left Lower extremity cellulitis was confirmed as present on   admission. Query created by: Joshua Sheikh on 2022 8:26 AM      QUERY TEXT:    Dr Rola Gibbons,    Pt admitted with Sepsis & UTI. Per ED physician: Severe Sepsis with septic   shock. If possible, please document in progress notes and discharge summary:    The medical record reflects the following:  Risk Factors: Sepsis with UTI  Clinical Indicators: Per ED physician: Severe Sepsis with septic shock. Laboratory eval concerning for severe sepsis secondary to a urinary tract   infection. Treatment: IV Rocephin & LR 2100 ml bolus.   Options provided:  -- Severe Sepsis with septic shock confirmed present on admission  -- Severe Sepsis with septic shock ruled out  -- Other - I will add my own diagnosis  -- Disagree - Not applicable / Not valid  -- Disagree - Clinically unable to determine / Unknown  -- Refer to Clinical Documentation Reviewer    PROVIDER RESPONSE TEXT:    The diagnosis of Severe Sepsis with septic shock was confirmed as present on   admission.     Query created by: Kevon Horowitz on 12/28/2022 8:28 AM      Electronically signed by:  Janelle Garcia DO 12/28/2022 9:12 AM

## 2022-12-28 NOTE — CARE COORDINATION
Case Management Assessment  Initial Evaluation    Date/Time of Evaluation: 12/28/2022 3:03 PM  Assessment Completed by: Alistair Flood RN    If patient is discharged prior to next notation, then this note serves as note for discharge by case management. Patient Name: Speedy Aguilar                   YOB: 1930  Diagnosis: Fall at home, initial encounter [MENG9Miguel Alarcon, D99.027]                   Date / Time: 12/27/2022  2:07 AM  Location: [address] [place] [address]     Patient Admission Status: Inpatient   Readmission Risk (Low < 19, Mod (19-27), High > 27): Readmission Risk Score: 13.7    Current PCP: Noni Winn, DO  PCP verified by CM? Chart Reviewed: Yes      History Provided by:  Rossy Feliz)  Patient Orientation: Other (see comment)    Patient Cognition: Dementia / Early Alzheimer's    Hospitalization in the last 30 days (Readmission):  No    If yes, Readmission Assessment in CM Navigator will be completed. Advance Directives:      Code Status: Full Code   Patient's Primary Decision Maker is:      Primary Decision Maker: Salina [facility] - Child - [phone]    Discharge Planning:    Patient lives with:   Type of Home: Assisted living  Primary Care Giver:    Patient Support Systems include: Children   Current Financial resources:    Current community resources:    Current services prior to admission:              Current DME:              Type of Home Care services:       ADLS  Prior functional level: Assistance with the following:  Current functional level: Assistance with the following:    Family can provide assistance at DC: No  Would you like Case Management to discuss the discharge plan with any other family members/significant others, and if so, who?     Plans to Return to Present Housing: Yes  Other Identified Issues/Barriers to RETURNING to current housing: None  Potential Assistance needed at discharge: Jose Gutierrez            Potential DME:    Patient expects to discharge to: Assisted living  Plan for transportation at discharge:      Financial    Payor: MEDICARE / Plan: MEDICARE PART A AND B / Product Type: *No Product type* /     Does insurance require precert for SNF: No    Potential assistance Purchasing Medications:    Meds-to-Beds request: Yes      [id number] - [place], [address] - F [phone]  [address]  Phone: [phone] Fax: [phone]    [address], [address] [facility] [address]  Phone: [phone] Fax: [phone]      Notes:    Factors facilitating achievement of predicted outcomes: Family support and Pleasant    Barriers to discharge: Confusion, Medical complications, and Medication managment    Additional Case Management Notes: Pt admitted through ER from [facility] with fall. Pt has dementia with bedside sitter currently. Tele, Rocephin IV daily, Lovenox,     Procedure: 12/27: CXR:   Cardiomegaly with prominent interstitial markings and mild central    pulmonary venous congestion favoring low-grade interstitial edema from    congestive heart failure   Left basilar subsegmental atelectasis  12/27: CT head:   Mild motion limited examination. No acute intracranial pathology. Cerebral volume loss with chronic microvascular gliosis in the    periventricular white matter   12/27: Venous doppler LLE: No acute DVT  12/27: ECHO: EF 65%  The Plan for Transition of Care is related to the following treatment goals of Fall at home, initial encounter [W19. [place] Mary, Y92.009]    Patient Goals/Plan/Treatment Preferences: meet and greet deferred to  as pt from [facility] with dementia. Plan is to return at discharge  Transportation/Food Security/Housekeeping Addressed: No issues identified.      Richard Snady RN  Case Management Department

## 2022-12-28 NOTE — CARE COORDINATION
12/28/22, 1:25 PM EST  Discharge Planning Evaluation  Social work consult received, patient from [facility]. Patient/Family preference is to return to [address] per daughter Cheryl Loredo. The patient's current payor source at the facility is private pay. Medicare skilled days available: n/a  Insurance precert:  n/a  Spoke with Arvni Cervantes at the facility.  Patient is a level two, able to return  Patient bed hold: yes  Anticipated transport plan: family  Do they require COVID 19 test to return to ECF:no  Is there a required time frame which which COVID test needs done:n/a  Patient's Healthcare Decision Maker: Legal Next of Dom Mayen

## 2022-12-28 NOTE — PROGRESS NOTES
[address] [place] [address]  INPATIENT PHYSICAL THERAPY  EVALUATION  STR MED SURG 8A - 8A-06/006-A    Time In: 0252  Time Out: 1411  Timed Code Treatment Minutes: 15 Minutes  Minutes: 24          Date: 2022  Patient Name: Ravinder Pat,  Gender:  female        MRN: [id number]  : 1930  (80 y.o.)      Referring Practitioner: Natasha Mcbride MD  Diagnosis: Fall at home, initial encounter  Additional Pertinent Hx: Per EMR \"80 y.o. female who presented to [address] [place] [address] from nursing home for evaluation of unwitnessed fall. PMHx significant for dementia. History obtained by chart review, patient unable to give me any history at this time. patient uses a walker to ambulate but did not use it at the time of fall. The fall was unwitnessed but staff heard her fall and they responded. There was no loss of consciousness, patient did not hit her head. Patient was complaining of left lower extremity on presentation. Patient is hard of hearing. She does deny chest pain or shortness of breath. Unable to tell me where she is at, which she is aware to self. She does report burning on urination. \" no acute fracture of head abnormalities. Restrictions/Precautions:  Restrictions/Precautions: General Precautions, Fall Risk  Position Activity Restriction  Other position/activity restrictions: hx of dementia    Subjective:  Chart Reviewed: Yes  Patient assessed for rehabilitation services?: Yes  Family / Caregiver Present: Yes  Subjective: OK to see pt per nursing. Pt in bedside chair, requesting to remain in chair following session. Pt and grand daughter were educated on d/c plans, recommendations for SNF, pt and granddaughter receptive, have to talk to patients daughter. Pt repeating self throughout session, asking the same questions. Telesitter present.      General:  Overall Orientation Status: Impaired  Vision: Within Functional Limits  Hearing: Exceptions to [facility]  Hearing Exceptions: Hard of hearing/hearing concerns       Pain: denies    Vitals: Vitals not assessed per clinical judgement, see nursing flowsheet    Social/Functional History:    Type of Home: Assisted living  Home Layout: One level  Home Access: Level entry  Home Equipment: Rollator     IADL Comments: per grand daughter, pt does not like to take showers, has assist for ADL tasks. ADL Assistance: Needs assistance  Homemaking Assistance:  (assist for housekeeping and meal prep)  Ambulation Assistance: Independent  Transfer Assistance: Independent    Active : No     Additional Comments: Per pt report, she was getting up and walking with a walker independently. Questionable historian. OBJECTIVE:  Range of Motion:  Bilateral Lower Extremity: WFL    Strength:  Bilateral Lower Extremity: Impaired - grossly deconditioned    Balance:  Static Sitting Balance:  Stand By Assistance  Dynamic Sitting Balance: Contact Guard Assistance  Static Standing Balance: Contact Guard Assistance  RW for support in standing  Bed Mobility:  Not Tested    Transfers:  Sit to Stand: Minimal Assistance, X 1, cues for hand placement, with verbal cues  Stand to Sit:Minimal Assistance, X 1, cues for hand placement, with verbal cues  Assist to control descent in sitting, >1 attempt to complete standing  Ambulation:  Contact Guard Assistance, X 1, with cues for safety, with verbal cues , with increased time for completion  Distance: 30 feet  Surface: Level Tile  Device:Rolling Walker  Gait Deviations: Forward Flexed Posture, Slow Amada, Decreased Step Length Bilaterally, Decreased Gait Speed, and reduced foot clearance  No LOB noted, slow pace noted    Exercise:  Patient was guided in 1 set(s) 10 reps of exercise to both lower extremities. Ankle pumps, Heelslides, Hip abduction/adduction, and Straight leg raises. Exercises were completed for increased independence with functional mobility.  Pt required VC and visual cues for proper technique of exercises with good-fair demo. Assist for SLR due to weakness    Functional Outcome Measures: Completed  AM-PAC Inpatient Mobility without Stair Climbing Raw Score : 15  AM-PAC Inpatient without Stair Climbing T-Scale Score : 43.03    ASSESSMENT:  Activity Tolerance:  Patient tolerance of  treatment: fair. Treatment Initiated: Treatment and education initiated within context of evaluation. Evaluation time included review of current medical information, gathering information related to past medical, social and functional history, completion of standardized testing, formal and informal observation of tasks, assessment of data and development of plan of care and goals. Treatment time included skilled education and facilitation of tasks to increase safety and independence with functional mobility for improved independence and quality of life. Assessment: Body Structures, Functions, Activity Limitations Requiring Skilled Therapeutic Intervention: Decreased functional mobility , Decreased safe awareness, Decreased cognition, Decreased endurance, Decreased balance, Decreased posture, Decreased strength  Assessment: Anai Hayden is a 80 y.o. female who presents with the deficits stated previously. Pt requires 1 person assist for functional tasks with use of walker for support. Pt cont to require skilled PT services to increase IND with functional tasks and progress towards PLOF to return to home environment safely. Therapy Prognosis: Good    Requires PT Follow-Up: Yes    Discharge Recommendations:  Discharge Recommendations: Continue to assess pending progress, Subacute/Skilled Nursing Facility, family and pt receptive. Patient Education:      .     Patient Education  Education Given To: Patient  Education Provided: Role of Therapy, Plan of Care, Transfer Training, Orientation  Education Method: Verbal  Education Outcome: Verbalized understanding, Demonstrated understanding, Continued education needed Equipment Recommendations:  Equipment Needed: No    Plan:  Current Treatment Recommendations: Strengthening, Gait training, Functional mobility training, Transfer training, Neuromuscular re-education, Balance training, Therapeutic activities, Patient/Caregiver education & training, Safety education & training, Endurance training, Equipment evaluation, education, & procurement  General Plan:  (5x GM)    Goals:  Patient Goals : return home  Short Term Goals  Time Frame for Short Term Goals: by discharge  Short Term Goal 1: Pt will demo sit to/from stand tranfers with RW for support and S for safety to progress with mobility. Short Term Goal 2: Pt will amb for 40 feet with RW for support to progress towards PLOF. Short Term Goal 3: Pt will tolerate 10-20 reps of ther ex to increase overall mobility. Long Term Goals  Time Frame for Long Term Goals : NA due to short ELOS    Following session, patient left in safe position with all fall risk precautions in place. Pt in bedside chair following session, all needs and call light in reach, alarm on.

## 2022-12-29 LAB
ANION GAP SERPL CALCULATED.3IONS-SCNC: 12 MEQ/L (ref 8–16)
BUN BLDV-MCNC: 13 MG/DL (ref 7–22)
CALCIUM SERPL-MCNC: 8.4 MG/DL (ref 8.5–10.5)
CHLORIDE BLD-SCNC: 103 MEQ/L (ref 98–111)
CO2: 23 MEQ/L (ref 23–33)
CREAT SERPL-MCNC: 0.6 MG/DL (ref 0.4–1.2)
ERYTHROCYTE [DISTWIDTH] IN BLOOD BY AUTOMATED COUNT: 16 % (ref 11.5–14.5)
ERYTHROCYTE [DISTWIDTH] IN BLOOD BY AUTOMATED COUNT: 57.6 FL (ref 35–45)
GFR SERPL CREATININE-BSD FRML MDRD: > 60 ML/MIN/1.73M2
GLUCOSE BLD-MCNC: 94 MG/DL (ref 70–108)
HCT VFR BLD CALC: 32.5 % (ref 37–47)
HEMOGLOBIN: 10.4 GM/DL (ref 12–16)
MCH RBC QN AUTO: 31.1 PG (ref 26–33)
MCHC RBC AUTO-ENTMCNC: 32 GM/DL (ref 32.2–35.5)
MCV RBC AUTO: 97.3 FL (ref 81–99)
PLATELET # BLD: 197 THOU/MM3 (ref 130–400)
PMV BLD AUTO: 10.7 FL (ref 9.4–12.4)
POTASSIUM SERPL-SCNC: 3.9 MEQ/L (ref 3.5–5.2)
RBC # BLD: 3.34 MILL/MM3 (ref 4.2–5.4)
SODIUM BLD-SCNC: 138 MEQ/L (ref 135–145)
WBC # BLD: 12.1 THOU/MM3 (ref 4.8–10.8)

## 2022-12-29 PROCEDURE — 99233 SBSQ HOSP IP/OBS HIGH 50: CPT | Performed by: INTERNAL MEDICINE

## 2022-12-29 PROCEDURE — 1200000003 HC TELEMETRY R&B

## 2022-12-29 PROCEDURE — 97116 GAIT TRAINING THERAPY: CPT

## 2022-12-29 PROCEDURE — 6370000000 HC RX 637 (ALT 250 FOR IP): Performed by: STUDENT IN AN ORGANIZED HEALTH CARE EDUCATION/TRAINING PROGRAM

## 2022-12-29 PROCEDURE — 2580000003 HC RX 258: Performed by: STUDENT IN AN ORGANIZED HEALTH CARE EDUCATION/TRAINING PROGRAM

## 2022-12-29 PROCEDURE — 80048 BASIC METABOLIC PNL TOTAL CA: CPT

## 2022-12-29 PROCEDURE — 36415 COLL VENOUS BLD VENIPUNCTURE: CPT

## 2022-12-29 PROCEDURE — 85027 COMPLETE CBC AUTOMATED: CPT

## 2022-12-29 PROCEDURE — 6360000002 HC RX W HCPCS: Performed by: STUDENT IN AN ORGANIZED HEALTH CARE EDUCATION/TRAINING PROGRAM

## 2022-12-29 PROCEDURE — 97110 THERAPEUTIC EXERCISES: CPT

## 2022-12-29 RX ADMIN — ENOXAPARIN SODIUM 40 MG: 100 INJECTION SUBCUTANEOUS at 09:41

## 2022-12-29 RX ADMIN — Medication 1 TABLET: at 09:41

## 2022-12-29 RX ADMIN — SODIUM CHLORIDE, PRESERVATIVE FREE 10 ML: 5 INJECTION INTRAVENOUS at 21:11

## 2022-12-29 RX ADMIN — Medication 1 TABLET: at 21:11

## 2022-12-29 RX ADMIN — CEFTRIAXONE SODIUM 1000 MG: 1 INJECTION, POWDER, FOR SOLUTION INTRAMUSCULAR; INTRAVENOUS at 09:48

## 2022-12-29 RX ADMIN — SODIUM CHLORIDE, PRESERVATIVE FREE 10 ML: 5 INJECTION INTRAVENOUS at 10:54

## 2022-12-29 RX ADMIN — DONEPEZIL HYDROCHLORIDE 5 MG: 5 TABLET, FILM COATED ORAL at 21:11

## 2022-12-29 ASSESSMENT — PAIN SCALES - GENERAL: PAINLEVEL_OUTOF10: 0

## 2022-12-29 NOTE — PROGRESS NOTES
[address] [address]  INPATIENT PHYSICAL THERAPY  DAILY NOTE  STRZ MED SURG 8A - 8A-06/006-A  Time In: 7650  Time Out: 0831  Timed Code Treatment Minutes: 25 Minutes  Minutes: 25          Date: 2022  Patient Name: Aubree Manzo,  Gender:  female        MRN: [id number]  : 1930  (80 y.o.)     Referring Practitioner: Fausto Ibrahim MD  Diagnosis: Fall at home, initial encounter  Additional Pertinent Hx: Per EMR \"80 y.o. female who presented to [address] [place] [address] from nursing home for evaluation of unwitnessed fall. PMHx significant for dementia. History obtained by chart review, patient unable to give me any history at this time. patient uses a walker to ambulate but did not use it at the time of fall. The fall was unwitnessed but staff heard her fall and they responded. There was no loss of consciousness, patient did not hit her head. Patient was complaining of left lower extremity on presentation. Patient is hard of hearing. She does deny chest pain or shortness of breath. Unable to tell me where she is at, which she is aware to self. She does report burning on urination. \" no acute fracture of head abnormalities. Prior Level of Function:  Type of Home: Assisted living  Home Layout: One level  Home Access: Level entry  Home Equipment: Rollator        ADL Assistance: Needs assistance  Homemaking Assistance:  (assist for housekeeping and meal prep)  Ambulation Assistance: Independent  Transfer Assistance: Independent  Active : No  IADL Comments: per grand daughter, pt does not like to take showers, has assist for ADL tasks. Additional Comments: Per pt report, she was getting up and walking with a walker independently. Questionable historian. Restrictions/Precautions:  Restrictions/Precautions: General Precautions, Fall Risk  Position Activity Restriction  Other position/activity restrictions: hx of dementia     SUBJECTIVE: OK to see pt per nursing.  Pt in bed when PT arrived, agreeable to PT session. Pt repeating self throughout session and asking similar questions frequently. PAIN: denies    Vitals: Nurse checked vitals prior to session    OBJECTIVE:  Bed Mobility:  Supine to Sit: Stand By Assistance, X 1, with head of bed raised    Transfers:  Sit to Stand: [employer], X 1, cues for hand placement, with verbal cues  Stand to [facility], X 1, cues for hand placement, with verbal cues  RW for support, cues for safety with fair carryover  Ambulation:  Contact Guard Assistance, X 1, with cues for safety, with verbal cues , with increased time for completion  Distance: 140 feet, 10 feet  Surface: Level Tile  Device:Rolling Walker  Gait Deviations: Forward Flexed Posture, Slow Amada, Decreased Step Length Bilaterally, Decreased Gait Speed, and decreased foot clearance  Cues for safety    Balance:  Static Sitting Balance:  Supervision  Static Standing Balance: Stand By Assistance, Contact Guard Assistance  RW for support for safety, no LOB noted  Exercise:  Patient was guided in 1 set(s) 10-15 reps of exercise to both lower extremities. Seated marches, Seated heel/toe raises, Long arc quads, Seated isometric hip adduction, and Seated abduction/adduction. Exercises were completed for increased independence with functional mobility. Pt required VC and visual cues for proper technique of exercises with good demo. Functional Outcome Measures: Completed  AM-PAC Inpatient Mobility without Stair Climbing Raw Score : 15  AM-PAC Inpatient without Stair Climbing T-Scale Score : 43.03    ASSESSMENT:  Assessment: Patient progressing toward established goals. Activity Tolerance:  Patient tolerance of  treatment: fair.      Equipment Recommendations:Equipment Needed: No  Discharge Recommendations: 24 hour assistance or supervision and Home with Home Health PT  Plan: Current Treatment Recommendations: Strengthening, Gait training, Functional mobility training, Transfer training, Neuromuscular re-education, Balance training, Therapeutic activities, Patient/Caregiver education & training, Safety education & training, Endurance training, Equipment evaluation, education, & procurement  General Plan:  (5x GM)    Patient Education  Patient Education: Plan of Care, Bed Mobility, Equipment Education, Transfers, Gait, Use of Gait Kimball, Verbal Exercise Instruction,  - Patient Verbalized Understanding, - Patient Requires Continued Education    Goals:  Patient Goals : return home  Short Term Goals  Time Frame for Short Term Goals: by discharge  Short Term Goal 1: Pt will demo sit to/from stand tranfers with RW for support and S for safety to progress with mobility. Short Term Goal 2: Pt will amb for 40 feet with RW for support to progress towards PLOF. Short Term Goal 3: Pt will tolerate 10-20 reps of ther ex to increase overall mobility. Long Term Goals  Time Frame for Long Term Goals : NA due to short ELOS    Following session, patient left in safe position with all fall risk precautions in place. Pt in bedside chair following session, all needs and call light in reach, alarm on.

## 2022-12-29 NOTE — CARE COORDINATION
12/29/22, 10:51 AM EST    DISCHARGE ON GOING EVALUATION    Maykel Don 42 day: 2  Location: 8A-06/006-A Reason for admit: Fall at home, initial encounter [W19. Mendez Mina, Y95.864]   Procedure: 12/27: CXR:   Cardiomegaly with prominent interstitial markings and mild central    pulmonary venous congestion favoring low-grade interstitial edema from    congestive heart failure   Left basilar subsegmental atelectasis  12/27: CT head:   Mild motion limited examination. No acute intracranial pathology.    Cerebral volume loss with chronic microvascular gliosis in the    periventricular white matter   12/27: Venous doppler LLE: No acute DVT  12/27: ECHO: EF 65%    Barriers to Discharge: bedside sitter off, continue IV Rocephin daily, lovenox, PT/OT following  PCP: Elizabeth Iverson DO  Readmission Risk Score: 13.7%  Patient Goals/Plan/Treatment Preferences: Plan return to [facility]

## 2022-12-29 NOTE — PROGRESS NOTES
Hospitalist note       Patient: Mathieu Ferrer  YOB: 1930    MRN: [id number]     Acct: [de-identified]    PCP: Jorge Cantrell DO    Date of Admission: 12/27/2022    Date of Service: Patient seen / examined on 12/29/22 and admitted to inpatient with expected LOS greater than two midnights due to medical therapy. ASSESSMENT / PLAN:    Sepsis -meets 3/4 SIRS criteria (elevated HR > 90, elevated RR > 20, elevated WBC) + UTI as source of infection. Lactic acid elevated. S/p 2L IVF. S/p Rocephin 1 g x1  Plan -await urine culture, continue Rocephin every 24 hours, pending culture results to narrow down antibiotic coverage. Will initiate gentle IV fluid hydration at 50 cc/h for 12 hours as patient has evidence of cardiomegaly with prominent interstitial markings and mild central pulmonary venous congestion favoring low-grade interstitial edema from congestive heart failure on chest x-ray. Will reassess fluid status. Urinary tract infection -UA positive for UTI (elevated WBC, positive nitrate, +leukocyte esterase, +bacteria), pending culture. Symptomatic. Continue Rocephin daily. Pulmonary vascular congestion -seen on chest x-ray. Patient does not have history of heart failure, last echo 8/2017 EF 60%. Patient is not hypoxic, on room air with SPO2 above 90%. Patient does not appear fluid overloaded on physical exam. She received 2 L of IV fluids for treatment of sepsis/dehydration. Plan -we will obtain full echo to evaluate heart structure and function. Gentle IV fluid hydration for 12 hours and then reassess fluid status. Daily weights. Strict intake and output. Hyponatremia, mild -sodium level 134, serum osmolality 271. Secondary to dehydration. Continue to monitor sodium level with daily BMP    Unwitnessed fall -no loss of consciousness/head trauma/fractures. Patient uses a walker at baseline but did not use her walker while ambulating to the bathroom. Consult PT OT. Dementia -resume Aricept 5 mg daily    LLE cellulitis will monitor patient on Rocephin for urinary tract infection. Chief Complaint:  Fall     History of Present Illness:  80 y.o. female who presented to [address] [place] [address] from nursing home for evaluation of unwitnessed fall. PMHx significant for dementia. History obtained by chart review, patient unable to give me any history at this time. patient uses a walker to ambulate but did not use it at the time of fall. The fall was unwitnessed but staff heard her fall and they responded. There was no loss of consciousness, patient did not hit her head. Patient was complaining of left lower extremity on presentation. Patient is hard of hearing. She does deny chest pain or shortness of breath. Unable to tell me where she is at, which she is aware to self. She does report burning on urination. Initial vital signs reveal temp 98, respiratory rate 16, heart rate 104, blood pressure 112/46, SPO2 96%. Lab work significant for sodium 134, chloride 97, bicarb 21, lactic acid 4.2, osmolality 271, WBC 12.6. Urinalysis positive for UTI. X-ray of left ankle no acute fractures. X-ray right ankle no acute fractures. X-ray of pelvis, no acute fractures. Chest x-ray reveals cardiomegaly with prominent interstitial markings and mild central pulmonary venous congestion favoring low-grade interstitial edema from congestive heart failure. Left basilar subsegmental atelectasis. CT head reveals no acute intracranial abnormalities, central volume loss with chronic microvascular gliosis in the periventricular white matter. CT spine no acute cervical spinal injury. Patient was treated with Rocephin 1 g x 1,  s/p 2 L IV fluid    Patient admitted under hospitalist service for further management.      Hospital course  12.28.2022 patient seen this a.m. severely demented urine culture grew enteric gram-negative bacilli greater than 100,000 colony-forming units per mL currently on Rocephin. Sodium 134 creatinine 0.6, WBC 16.4, hemoglobin 10.0, lakelets 185. We will continue to monitor    12.29.2022 Case discussed with social service patient walked 140 feet with physical therapy does not qualify for ECF but states she is a little lightheaded when standing we will ask to get some orthostatic vitals will continue for ceftriaxone today we will place on Augmentin tomorrow at time of discharge. Will discharge to assisted living in a.m. Past Medical History:    Past Medical History:   Diagnosis Date    Arthritis     Cancer ([address]     skin    Colitis     Colon polyps     Fatty liver     resolved per patient    Hearing loss of both ears     Hyperlipidemia     Varicose vein of leg     Varicosity      Past Surgical History:    Past Surgical History:   Procedure Laterality Date    BLADDER SUSPENSION  1968    FOOT SURGERY Right 1970s    HYSTERECTOMY (CERVIX STATUS UNKNOWN)  1968    JOINT REPLACEMENT Left     left knee    JOINT REPLACEMENT Right     hip    MOHS SURGERY  1/22/14    NOSE    [place] OFFICE/OUTPT VISIT,PROCEDURE ONLY N/A 9/19/2018    MOHS DEFECT REPAIR BCC DORSUM OF NOSE performed by Kelly Smith MD at [address] [place] [address]  2008    small blockage-adhesion      Medications Prior to Admission:   No current facility-administered medications on file prior to encounter. Current Outpatient Medications on File Prior to Encounter   Medication Sig Dispense Refill    Misc. Devices (COMMODE BEDSIDE) MISC Use as directed 1 each 0    Misc. Devices [facility]) MISC Use as directed 1 each 0    Handicap Placard MISC by Does not apply route Expires 4/28/27 1 each 0    triamcinolone (KENALOG) 0.025 % cream apply to affected area OF LOWER LIP once daily      Elastic Bandages & Supports (T.E.D. ANTI-EMBOLISM STOCKINGS) MISC Knee high SAMMY hose. Please measure pt for appropriate size. Wear during daytime, take off at night.  1 each 1    Handicap Placard MISC by Does not apply route Expires 10/30/25 1 each 0    Misc. Devices (WALKER) MISC Wheeled walker with seat, use as directed 1 each 0    acetaminophen (TYLENOL) 325 MG tablet Take 650 mg by mouth every 6 hours as needed for Pain      calcium carbonate-vitamin D (CALCIUM 600 + D) 600-400 MG-UNIT TABS per tab Take 1 tablet by mouth 2 times daily 60 tablet 5    Multiple Vitamins-Minerals (THERAPEUTIC MULTIVITAMIN-MINERALS) tablet Take 1 tablet by mouth daily 30 tablet 11    traZODone (DESYREL) 50 MG tablet Take 0.5 tablets by mouth nightly as needed for Sleep 15 tablet 5    mirabegron (MYRBETRIQ) 50 MG TB24 Take 50 mg by mouth daily 30 tablet 3    donepezil (ARICEPT) 5 MG tablet take 1 tablet by mouth ONCE NIGHTLY. 90 tablet 3    Psyllium (METAMUCIL) 28.3 % POWD 1 tablespoon in 8 oz water daily by mouth 1365 g 3     Allergies:   Sulfa antibiotics    Social History:   Social History     Socioeconomic History    Marital status:       Spouse name: Not on file    Number of children: Not on file    Years of education: Not on file    Highest education level: Not on file   Occupational History    Not on file   Tobacco Use    Smoking status: Never    Smokeless tobacco: Never   Substance and Sexual Activity    Alcohol use: Yes     Comment: rare    Drug use: No    Sexual activity: Not Currently   Other Topics Concern    Not on file   Social History Narrative    Not on file     Social Determinants of Health     Financial Resource Strain: Not on file   Food Insecurity: Not on file   Transportation Needs: Not on file   Physical Activity: Inactive    Days of Exercise per Week: 0 days    Minutes of Exercise per Session: 0 min   Stress: Not on file   Social Connections: Not on file   Intimate Partner Violence: Not on file   Housing Stability: Not on file     Family History:    Family History   Problem Relation Age of Onset    Arthritis Mother     Arthritis Father     Cancer Father         lung    Stroke Father Diabetes Sister      REVIEW OF SYSTEMS:    Unable to obtain    PHYSICAL EXAM:  Vitals:    12/29/22 0401 12/29/22 0809 12/29/22 1110 12/29/22 1156   BP: 124/71 135/60 (!) 124/53 137/66   Pulse: 91 75 78 79   Resp: 20 18 18 18   Temp: 98.9 °F (37.2 °C) 98.5 °F (36.9 °C) 98 °F (36.7 °C) 98.1 °F (36.7 °C)   TempSrc: Oral Oral Oral Oral   SpO2: 97% 95% 97% 97%   Weight: 157 lb 6 oz (71.4 kg)      Height:         General appearance: Chronically ill-appearing female  HEENT:  Normocephalic / atraumatic. PERRL. EOM intact. Conjunctivae appear normal.  Neck: Supple. No JVD. Respiratory: Normal respiratory effort on RA. Cardiovascular: RRR. Abdomen: Soft / non-tender / non-distended. BS present. Musculoskeletal: No cyanosis or edema. Skin: Warm / dry. Normal turgor. Neurologic: A/O x 1 (self). Psychiatric: Unable to evaluate    Labs:   Results for orders placed or performed during the hospital encounter of 12/27/22   Blood Culture 1    Specimen: Blood   Result Value Ref Range    Blood Culture, Routine No growth 24 hours. No growth 48 hours. Blood Culture 2    Specimen: Blood   Result Value Ref Range    Blood Culture, Routine No growth 24 hours. No growth 48 hours.     Culture, Reflexed, Urine    Specimen: Urine, catheter   Result Value Ref Range    Organism Escherichia coli (A)     Urine Culture Reflex [place] count: >100,000 CFU/mL        Susceptibility    Escherichia coli - BACTERIAL SUSCEPTIBILITY PANEL BY ROQUE     amoxicillin-clavulanate <=2 Sensitive mcg/mL     cefOXitin <=4 Sensitive mcg/mL     cefTRIAXone <=1 Sensitive mcg/mL     ampicillin <=2 Sensitive mcg/mL     gentamicin <=1 Sensitive mcg/mL     trimethoprim-sulfamethoxazole <=20 Sensitive mcg/mL     tetracycline <=1 Sensitive mcg/mL     nitrofurantoin <=16 Sensitive mcg/mL   CBC with Auto Differential   Result Value Ref Range    WBC 12.6 (H) 4.8 - 10.8 thou/mm3    RBC 3.82 (L) 4.20 - 5.40 mill/mm3    Hemoglobin 12.1 12.0 - 16.0 gm/dl    Hematocrit 36.7 (L) 37.0 - 47.0 %    MCV 96.1 81.0 - 99.0 fL    MCH 31.7 26.0 - 33.0 pg    MCHC 33.0 32.2 - 35.5 gm/dl    RDW-CV 15.7 (H) 11.5 - 14.5 %    RDW-SD 55.4 (H) 35.0 - 45.0 fL    Platelets 237 223 - 701 thou/mm3    MPV 10.2 9.4 - 12.4 fL    Seg Neutrophils 85.4 %    Lymphocytes 2.6 %    Monocytes 10.7 %    Eosinophils 0.0 %    Basophils 0.2 %    Immature Granulocytes 1.1 %    Platelet Estimate ADEQUATE Adequate    Segs Absolute 10.8 (H) 1.8 - 7.7 thou/mm3    Lymphocytes Absolute 0.3 (L) 1.0 - 4.8 thou/mm3    Monocytes Absolute 1.3 0.4 - 1.3 thou/mm3    Eosinophils Absolute 0.0 0.0 - 0.4 thou/mm3    Basophils Absolute 0.0 0.0 - 0.1 thou/mm3    Immature Grans (Abs) 0.14 (H) 0.00 - 0.07 thou/mm3    nRBC 0 /100 wbc   BMP   Result Value Ref Range    Sodium 134 (L) 135 - 145 meq/L    Potassium 3.6 3.5 - 5.2 meq/L    Chloride 97 (L) 98 - 111 meq/L    CO2 21 (L) 23 - 33 meq/L    Glucose 140 (H) 70 - 108 mg/dL    BUN 15 7 - 22 mg/dL    Creatinine 0.9 0.4 - 1.2 mg/dL    Calcium 8.9 8.5 - 10.5 mg/dL   Hepatic Function Panel   Result Value Ref Range    Albumin 3.8 3.5 - 5.1 g/dL    Total Bilirubin 0.5 0.3 - 1.2 mg/dL    Bilirubin, Direct <0.2 0.0 - 0.3 mg/dL    Alkaline Phosphatase 134 (H) 38 - 126 U/L    AST 31 5 - 40 U/L    ALT 25 11 - 66 U/L    Total Protein 6.8 6.1 - 8.0 g/dL   Lactic Acid   Result Value Ref Range    Lactic Acid 4.2 (H) 0.5 - 2.0 mmol/L   Urine with Reflexed Micro   Result Value Ref Range    Glucose, Ur NEGATIVE NEGATIVE mg/dl    Bilirubin Urine NEGATIVE NEGATIVE    Ketones, Urine NEGATIVE NEGATIVE    Specific Gravity, Urine 1.013 1.002 - 1.030    Blood, Urine NEGATIVE NEGATIVE    pH, UA 6.5 5.0 - 9.0    Protein, UA NEGATIVE NEGATIVE    Urobilinogen, Urine 0.2 0.0 - 1.0 eu/dl    Nitrite, Urine POSITIVE (A) NEGATIVE    Leukocyte Esterase, Urine LARGE (A) NEGATIVE    Color, UA YELLOW STRAW-YELLOW    Character, Urine CLOUDY (A) CLEAR-SL CLOUD    RBC, UA 0-2 0-2/hpf /hpf    WBC, UA > 200 0-4/hpf /hpf    Epithelial Cells, UA NONE SEEN 3-5/hpf /hpf    Bacteria, UA FEW FEW/NONE SEEN /hpf    Casts UA NONE SEEN NONE SEEN /lpf    Crystals, UA NONE SEEN NONE SEEN    Renal Epithelial, UA NONE SEEN NONE SEEN    Yeast, UA NONE SEEN NONE SEEN    CASTS 2 NONE SEEN NONE SEEN /lpf    MISCELLANEOUS 2 NONE SEEN    Anion Gap   Result Value Ref Range    Anion Gap 16.0 8.0 - 16.0 meq/L   Osmolality   Result Value Ref Range    Osmolality Calc 271.4 (L) 275.0 - 300.0 mOsmol/kg   Glomerular Filtration Rate, Estimated   Result Value Ref Range    Est, Glom Filt Rate 60 >60 ml/min/1.73m2   Scan of Blood Smear   Result Value Ref Range    SCAN OF BLOOD SMEAR see below    CBC   Result Value Ref Range    WBC 16.4 (H) 4.8 - 10.8 thou/mm3    RBC 3.19 (L) 4.20 - 5.40 mill/mm3    Hemoglobin 10.0 (L) 12.0 - 16.0 gm/dl    Hematocrit 30.3 (L) 37.0 - 47.0 %    MCV 95.0 81.0 - 99.0 fL    MCH 31.3 26.0 - 33.0 pg    MCHC 33.0 32.2 - 35.5 gm/dl    RDW-CV 15.9 (H) 11.5 - 14.5 %    RDW-SD 55.5 (H) 35.0 - 45.0 fL    Platelets 359 290 - 655 thou/mm3    MPV 10.1 9.4 - 12.4 fL   Basic Metabolic Panel   Result Value Ref Range    Sodium 134 (L) 135 - 145 meq/L    Potassium 3.6 3.5 - 5.2 meq/L    Chloride 101 98 - 111 meq/L    CO2 23 23 - 33 meq/L    Glucose 98 70 - 108 mg/dL    BUN 16 7 - 22 mg/dL    Creatinine 0.6 0.4 - 1.2 mg/dL    Calcium 8.1 (L) 8.5 - 10.5 mg/dL   Anion Gap   Result Value Ref Range    Anion Gap 10.0 8.0 - 16.0 meq/L   Glomerular Filtration Rate, Estimated   Result Value Ref Range    Est, Glom Filt Rate >60 >60 ml/min/1.73m2   CBC   Result Value Ref Range    WBC 12.1 (H) 4.8 - 10.8 thou/mm3    RBC 3.34 (L) 4.20 - 5.40 mill/mm3    Hemoglobin 10.4 (L) 12.0 - 16.0 gm/dl    Hematocrit 32.5 (L) 37.0 - 47.0 %    MCV 97.3 81.0 - 99.0 fL    MCH 31.1 26.0 - 33.0 pg    MCHC 32.0 (L) 32.2 - 35.5 gm/dl    RDW-CV 16.0 (H) 11.5 - 14.5 %    RDW-SD 57.6 (H) 35.0 - 45.0 fL    Platelets 257 918 - 599 thou/mm3    MPV 10.7 9.4 - 12.4 fL   Basic Metabolic Panel   Result Value Ref Range    Sodium 138 135 - 145 meq/L    Potassium 3.9 3.5 - 5.2 meq/L    Chloride 103 98 - 111 meq/L    CO2 23 23 - 33 meq/L    Glucose 94 70 - 108 mg/dL    BUN 13 7 - 22 mg/dL    Creatinine 0.6 0.4 - 1.2 mg/dL    Calcium 8.4 (L) 8.5 - 10.5 mg/dL   Anion Gap   Result Value Ref Range    Anion Gap 12.0 8.0 - 16.0 meq/L   Glomerular Filtration Rate, Estimated   Result Value Ref Range    Est, Glom Filt Rate >60 >60 ml/min/1.73m2   POCT Glucose   Result Value Ref Range    POC Glucose 118 (H) 70 - 108 mg/dl   EKG 12 Lead   Result Value Ref Range    Ventricular Rate 104 BPM    Atrial Rate 104 BPM    P-R Interval 174 ms    QRS Duration 68 ms    Q-T Interval 308 ms    QTc Calculation (Bazett) 405 ms    P Axis 95 degrees    R Axis 9 degrees    T Axis 78 degrees       EKG / Radiology:     EKG:  Reviewed by me --    CXR:   Reviewed by me --    XR PELVIS (1-2 VIEWS)    Result Date: 12/27/2022  AP pelvis. Comparison: CT abdomen pelvis with contrast 09/14/2010. Findings: Right hip arthroplasty in anatomic alignment. No signs of prosthetic loosening. Severe left hip joint space narrowing with mild subarticular sclerosis and osteophytosis. Multilevel severe disc space narrowing with spondylotic spurring and facet arthropathy at L3-L4, L4-L5, and L5-S1. Mild levoscoliosis in the mid through lower lumbar spine. Sacroiliac joints are intact. No acute bony abnormality. Chronic degenerative changes. Status post right hip arthroplasty. This document has been electronically signed by: Nestor Hamman. Jaelyn Garcia on 12/27/2022 06:50 AM    XR ANKLE LEFT (MIN 3 VIEWS)    Result Date: 12/27/2022  Three-view left ankle. Comparison: None. Findings: Negative for acute fracture or dislocation. No suspicious bony lesions. Equivocal small tibiotalar joint effusion. Diffuse soft tissue swelling. Posterior and plantar calcaneal spurs. Joint spaces are preserved. No acute bony abnormality.  This document has been electronically signed by: Makayla Brownroseanna on 12/27/2022 06:57 AM    XR ANKLE RIGHT (MIN 3 VIEWS)    Result Date: 12/27/2022  3 views of the ankle. Comparison: None. Findings: Negative for acute fracture or dislocation. No suspicious bony lesions. Mild osteopenia. Soft tissue swelling. Minor posterior calcaneal enthesophyte. No significant detected joint effusion. Mild atherosclerosis. No acute bony abnormality. This document has been electronically signed by: Dacia Elkinskaur George on 12/27/2022 06:52 AM    CT HEAD WO CONTRAST    Result Date: 12/27/2022  CT head without contrast. CT head without contrast 01/20/2011. Technique: Unenhanced axial sections from the base to the vertex with multiplanar reformats. Findings: Mild motion artifact at multiple levels limits fine anatomic detail. Cerebral volume loss with reciprocal ventricular and subarachnoid enlargement. Low-density changes in the periventricular white matter compatible with chronic microvascular gliosis. Negative for intracranial hemorrhage, mass, or CT evidence of acute infarction. Bony structures are intact. No sinus fluid levels or mastoid opacification. Impression: Mild motion limited examination. No acute intracranial pathology. Cerebral volume loss with chronic microvascular gliosis in the periventricular white matter. This document has been electronically signed by: Dacia Vazquez on 12/27/2022 05:09 AM All CTs at this facility use dose modulation techniques and iterative reconstructions, and/or weight-based dosing when appropriate to reduce radiation to a low as reasonably achievable. CT CSpine W/O Contrast    Result Date: 12/27/2022  CT cervical spine without contrast. Comparison: None. Technique: Unenhanced axial sections with multiplanar reformats. Findings: Negative for acute cervical spine fracture or facet dislocation. The craniocervical junction is intact. Multilevel spondylotic disc disease and arthropathy.  No critical central canal stenosis. Osteopenia. No CT evidence of acute cervical spinal injury. Multilevel chronic degenerative disc disease and arthropathy. This document has been electronically signed by: Melinda Churchill on 12/27/2022 05:11 AM All CTs at this facility use dose modulation techniques and iterative reconstructions, and/or weight-based dosing when appropriate to reduce radiation to a low as reasonably achievable. XR CHEST PORTABLE    Result Date: 12/27/2022  Portable AP chest. Comparison: 01/21/2017 10:38 AM EST (09:38 AM CST) : CR: CHEST PA /T/ LAT Findings: Mild cardiomegaly. Central pulmonary venous congestion. Prominent interstitial markings bilaterally. Subsegmental atelectasis in the left lung base. No defined pleural effusion. No acute bony abnormality. Aortic atherosclerosis. Impression: Cardiomegaly with prominent interstitial markings and mild central pulmonary venous congestion favoring low-grade interstitial edema from congestive heart failure. Left basilar subsegmental atelectasis. This document has been electronically signed by: Melinda Louise. Miles Churchill on 12/27/2022 06:55 AM    FEN/GI/DVT:  IVF: None  Electrolytes: Monitor and replace per protocols  Diet: General  GI PPX: Yes  DVT Prophylaxis: Lovenox    CODE STATUS:  Full    Thank you Shanna Berman DO for the opportunity to be involved in this patient's care.     Electronically signed by Bridger Reina DO on 12/29/2022 at 12:14 PM

## 2022-12-30 VITALS
WEIGHT: 157.38 LBS | BODY MASS INDEX: 30.9 KG/M2 | SYSTOLIC BLOOD PRESSURE: 164 MMHG | DIASTOLIC BLOOD PRESSURE: 86 MMHG | TEMPERATURE: 98.6 F | RESPIRATION RATE: 18 BRPM | HEART RATE: 77 BPM | HEIGHT: 60 IN | OXYGEN SATURATION: 95 %

## 2022-12-30 LAB
ANION GAP SERPL CALCULATED.3IONS-SCNC: 12 MEQ/L (ref 8–16)
BUN BLDV-MCNC: 10 MG/DL (ref 7–22)
CALCIUM SERPL-MCNC: 8.1 MG/DL (ref 8.5–10.5)
CHLORIDE BLD-SCNC: 103 MEQ/L (ref 98–111)
CO2: 25 MEQ/L (ref 23–33)
CREAT SERPL-MCNC: 0.5 MG/DL (ref 0.4–1.2)
ERYTHROCYTE [DISTWIDTH] IN BLOOD BY AUTOMATED COUNT: 15.7 % (ref 11.5–14.5)
ERYTHROCYTE [DISTWIDTH] IN BLOOD BY AUTOMATED COUNT: 57.2 FL (ref 35–45)
GFR SERPL CREATININE-BSD FRML MDRD: > 60 ML/MIN/1.73M2
GLUCOSE BLD-MCNC: 92 MG/DL (ref 70–108)
HCT VFR BLD CALC: 32.3 % (ref 37–47)
HEMOGLOBIN: 10.3 GM/DL (ref 12–16)
MCH RBC QN AUTO: 31.3 PG (ref 26–33)
MCHC RBC AUTO-ENTMCNC: 31.9 GM/DL (ref 32.2–35.5)
MCV RBC AUTO: 98.2 FL (ref 81–99)
PLATELET # BLD: 218 THOU/MM3 (ref 130–400)
PMV BLD AUTO: 10.1 FL (ref 9.4–12.4)
POTASSIUM SERPL-SCNC: 3.6 MEQ/L (ref 3.5–5.2)
RBC # BLD: 3.29 MILL/MM3 (ref 4.2–5.4)
SODIUM BLD-SCNC: 140 MEQ/L (ref 135–145)
WBC # BLD: 7.1 THOU/MM3 (ref 4.8–10.8)

## 2022-12-30 PROCEDURE — 6370000000 HC RX 637 (ALT 250 FOR IP): Performed by: STUDENT IN AN ORGANIZED HEALTH CARE EDUCATION/TRAINING PROGRAM

## 2022-12-30 PROCEDURE — 99239 HOSP IP/OBS DSCHRG MGMT >30: CPT | Performed by: INTERNAL MEDICINE

## 2022-12-30 PROCEDURE — 2580000003 HC RX 258: Performed by: STUDENT IN AN ORGANIZED HEALTH CARE EDUCATION/TRAINING PROGRAM

## 2022-12-30 PROCEDURE — 80048 BASIC METABOLIC PNL TOTAL CA: CPT

## 2022-12-30 PROCEDURE — 85027 COMPLETE CBC AUTOMATED: CPT

## 2022-12-30 PROCEDURE — 6360000002 HC RX W HCPCS: Performed by: STUDENT IN AN ORGANIZED HEALTH CARE EDUCATION/TRAINING PROGRAM

## 2022-12-30 PROCEDURE — 97116 GAIT TRAINING THERAPY: CPT

## 2022-12-30 PROCEDURE — 97110 THERAPEUTIC EXERCISES: CPT

## 2022-12-30 PROCEDURE — 36415 COLL VENOUS BLD VENIPUNCTURE: CPT

## 2022-12-30 RX ORDER — AMOXICILLIN AND CLAVULANATE POTASSIUM 500; 125 MG/1; MG/1
1 TABLET, FILM COATED ORAL 3 TIMES DAILY
Qty: 15 TABLET | Refills: 0 | Status: SHIPPED | OUTPATIENT
Start: 2022-12-30 | End: 2023-01-04

## 2022-12-30 RX ADMIN — ENOXAPARIN SODIUM 40 MG: 100 INJECTION SUBCUTANEOUS at 09:12

## 2022-12-30 RX ADMIN — Medication 1 TABLET: at 09:12

## 2022-12-30 RX ADMIN — SODIUM CHLORIDE, PRESERVATIVE FREE 10 ML: 5 INJECTION INTRAVENOUS at 09:12

## 2022-12-30 RX ADMIN — CEFTRIAXONE SODIUM 1000 MG: 1 INJECTION, POWDER, FOR SOLUTION INTRAMUSCULAR; INTRAVENOUS at 09:15

## 2022-12-30 ASSESSMENT — PAIN SCALES - GENERAL
PAINLEVEL_OUTOF10: 0
PAINLEVEL_OUTOF10: 0

## 2022-12-30 NOTE — PLAN OF CARE
Problem: Discharge Planning  Goal: Discharge to home or other facility with appropriate resources  12/30/2022 1528 by Yoanna Barreto RN  Outcome: Adequate for Discharge     Problem: Pain  Goal: Verbalizes/displays adequate comfort level or baseline comfort level  12/30/2022 1528 by Yoanna Barreto RN  Outcome: Adequate for Discharge  12/30/2022 1527 by Yoanna Barreto RN  Outcome: Progressing     Problem: Safety - Adult  Goal: Free from fall injury  12/30/2022 1528 by Yoanna Barreto RN  Outcome: Adequate for Discharge  12/30/2022 1527 by Yoanna Barreto RN  Outcome: Progressing  Problem: ABCDS Injury Assessment  Goal: Absence of physical injury  12/30/2022 1528 by Yoanna Barreto RN  Outcome: Adequate for Discharge  Problem: Skin/Tissue Integrity  Goal: Absence of new skin breakdown  Description: 1. Monitor for areas of redness and/or skin breakdown  2. Assess vascular access sites hourly  3. Every 4-6 hours minimum:  Change oxygen saturation probe site  4. Every 4-6 hours:  If on nasal continuous positive airway pressure, respiratory therapy assess nares and determine need for appliance change or resting period.   12/30/2022 1528 by Yoanna Barreto RN  Outcome: Adequate for Discharge  Problem: Chronic Conditions and Co-morbidities  Goal: Patient's chronic conditions and co-morbidity symptoms are monitored and maintained or improved  12/30/2022 1528 by Yoanna Barreto RN  Outcome: Adequate for Discharge

## 2022-12-30 NOTE — PLAN OF CARE
Problem: Discharge Planning  Goal: Discharge to home or other facility with appropriate resources  12/30/2022 0517 by Devon Rand RN  Outcome: Progressing  Flowsheets (Taken 12/30/2022 4120)  Discharge to home or other facility with appropriate resources: Identify barriers to discharge with patient and caregiver  12/30/2022 0517 by Devon Rand RN  Outcome: Progressing  Flowsheets (Taken 12/30/2022 3143)  Discharge to home or other facility with appropriate resources: Identify barriers to discharge with patient and caregiver     Problem: Pain  Goal: Verbalizes/displays adequate comfort level or baseline comfort level  12/30/2022 0517 by Devon Rand RN  Outcome: Progressing  Flowsheets (Taken 12/30/2022 0517)  Verbalizes/displays adequate comfort level or baseline comfort level: Encourage patient to monitor pain and request assistance  12/30/2022 0517 by Devon Rand RN  Outcome: Progressing  Flowsheets (Taken 12/30/2022 0517)  Verbalizes/displays adequate comfort level or baseline comfort level: Encourage patient to monitor pain and request assistance     Problem: Safety - Adult  Goal: Free from fall injury  12/30/2022 0517 by Devon Rand RN  Outcome: Progressing  Flowsheets (Taken 12/30/2022 0517)  Free From Fall Injury:   Instruct family/caregiver on patient safety   Based on caregiver fall risk screen, instruct family/caregiver to ask for assistance with transferring infant if caregiver noted to have fall risk factors  12/30/2022 0517 by Devon Rand RN  Outcome: Progressing  [address] (Taken 12/30/2022 0517)  Free From Fall Injury:   Instruct family/caregiver on patient safety   Based on caregiver fall risk screen, instruct family/caregiver to ask for assistance with transferring infant if caregiver noted to have fall risk factors     Problem: ABCDS Injury Assessment  Goal: Absence of physical injury  12/30/2022 0517 by Devon Rand RN  Outcome: Progressing  Flowsheets (Taken 12/30/2022 0517)  Absence of Physical Injury: Implement safety measures based on patient assessment  12/30/2022 0517 by Jovana Castro RN  Outcome: Progressing  Flowsheets (Taken 12/30/2022 0517)  Absence of Physical Injury: Implement safety measures based on patient assessment     Problem: Skin/Tissue Integrity  Goal: Absence of new skin breakdown  Description: 1. Monitor for areas of redness and/or skin breakdown  2. Assess vascular access sites hourly  3. Every 4-6 hours minimum:  Change oxygen saturation probe site  4. Every 4-6 hours:  If on nasal continuous positive airway pressure, respiratory therapy assess nares and determine need for appliance change or resting period.   12/30/2022 0517 by Jovana Castro RN  Outcome: Progressing  12/30/2022 0517 by Jovana Castro RN  Outcome: Progressing     Problem: Chronic Conditions and Co-morbidities  Goal: Patient's chronic conditions and co-morbidity symptoms are monitored and maintained or improved  12/30/2022 0517 by Jovana Castro RN  Outcome: Progressing  Flowsheets (Taken 12/30/2022 0517)  Care Plan - Patient's Chronic Conditions and Co-Morbidity Symptoms are Monitored and Maintained or Improved:   Monitor and assess patient's chronic conditions and comorbid symptoms for stability, deterioration, or improvement   Collaborate with multidisciplinary team to address chronic and comorbid conditions and prevent exacerbation or deterioration   Update acute care plan with appropriate goals if chronic or comorbid symptoms are exacerbated and prevent overall improvement and discharge  12/30/2022 0517 by Jovana Castro RN  Outcome: Progressing  Flowsheets (Taken 12/30/2022 0517)  Care Plan - Patient's Chronic Conditions and Co-Morbidity Symptoms are Monitored and Maintained or Improved:   Monitor and assess patient's chronic conditions and comorbid symptoms for stability, deterioration, or improvement   Collaborate with multidisciplinary team to address chronic and comorbid conditions and prevent exacerbation or deterioration   Update acute care plan with appropriate goals if chronic or comorbid symptoms are exacerbated and prevent overall improvement and discharge

## 2022-12-30 NOTE — DISCHARGE SUMMARY
Hospital Medicine Discharge Summary      Patient Identification:   Kylie Hewitt   : 1930  MRN: [id number]   Account: [de-identified]      Patient's PCP: Ellie Cardenas DO    Admit Date: 2022     Discharge Date:   2022    Admitting Physician: No admitting provider for patient encounter. Discharge Physician: Hilario Lucia DO     Discharge Diagnoses: Active Hospital Problems    Diagnosis Date Noted    Fall at home, initial encounter [W19. Karin Martínez, S10.417] 2022     Priority: Medium       The patient was seen and examined on day of discharge and this discharge summary is in conjunction with any daily progress note from day of discharge. Hospital Course:   Kylie Hewitt is a 80 y.o. female admitted to [address] [place] [address] on 2022 for sepsis due to uti. Sepsis -meets 3/4 SIRS criteria (elevated HR > 90, elevated RR > 20, elevated WBC) + UTI as source of infection. Lactic acid elevated. S/p 2L IVF. S/p Rocephin 1 g x1  Plan -await urine culture, continue Rocephin every 24 hours, pending culture results to narrow down antibiotic coverage. Will initiate gentle IV fluid hydration at 50 cc/h for 12 hours as patient has evidence of cardiomegaly with prominent interstitial markings and mild central pulmonary venous congestion favoring low-grade interstitial edema from congestive heart failure on chest x-ray. Will reassess fluid status. Urinary tract infection -UA positive for UTI (elevated WBC, positive nitrate, +leukocyte esterase, +bacteria), pending culture. Symptomatic. Continue Rocephin daily. Pulmonary vascular congestion -seen on chest x-ray. Patient does not have history of heart failure, last echo 2017 EF 60%. Patient is not hypoxic, on room air with SPO2 above 90%. Patient does not appear fluid overloaded on physical exam. She received 2 L of IV fluids for treatment of sepsis/dehydration.   Plan -we will obtain full echo to evaluate heart structure and function. Gentle IV fluid hydration for 12 hours and then reassess fluid status. Daily weights. Strict intake and output. Hyponatremia, mild -sodium level 134, serum osmolality 271. Secondary to dehydration. Continue to monitor sodium level with daily BMP     Unwitnessed fall -no loss of consciousness/head trauma/fractures. Patient uses a walker at baseline but did not use her walker while ambulating to the bathroom. Consult PT OT. Dementia -resume Aricept 5 mg daily     LLE cellulitis will monitor patient on Rocephin for urinary tract infection. Chief Complaint:  Fall      History of Present Illness:  80 y.o. female who presented to [address] [place] [address] from nursing [facility] for evaluation of unwitnessed fall. PMHx significant for dementia. History obtained by chart review, patient unable to give me any history at this time. patient uses a walker to ambulate but did not use it at the time of fall. The fall was unwitnessed but staff heard her fall and they responded. There was no loss of consciousness, patient did not hit her head. Patient was complaining of left lower extremity on presentation. Patient is hard of hearing. She does deny chest pain or shortness of breath. Unable to tell me where she is at, which she is aware to self. She does report burning on urination. Initial vital signs reveal temp 98, respiratory rate 16, heart rate 104, blood pressure 112/46, SPO2 96%. Lab work significant for sodium 134, chloride 97, bicarb 21, lactic acid 4.2, osmolality 271, WBC 12.6. Urinalysis positive for UTI. X-ray of left ankle no acute fractures. X-ray right ankle no acute fractures. X-ray of pelvis, no acute fractures. Chest x-ray reveals cardiomegaly with prominent interstitial markings and mild central pulmonary venous congestion favoring low-grade interstitial edema from congestive heart failure. Left basilar subsegmental atelectasis.       CT head reveals no acute intracranial abnormalities, central volume loss with chronic microvascular gliosis in the periventricular white matter. CT spine no acute cervical spinal injury. Patient was treated with Rocephin 1 g x 1,  s/p 2 L IV fluid     Patient admitted under hospitalist service for further management. Hospital course  12.28.2022 patient seen this a.m. severely demented urine culture grew enteric gram-negative bacilli greater than 100,000 colony-forming units per mL currently on Rocephin. Sodium 134 creatinine 0.6, WBC 16.4, hemoglobin 10.0, lakelets 185. We will continue to monitor     12.29.2022 Case discussed with social service patient walked 140 feet with physical therapy does not qualify for ECF but states she is a little lightheaded when standing we will ask to get some orthostatic vitals will continue for ceftriaxone today we will place on Augmentin tomorrow at time of discharge. Will discharge to assisted living in a.m. Exam:     Vitals:  Vitals:    12/29/22 1156 12/29/22 1517 12/29/22 1930 12/30/22 0350   BP: 137/66 (!) 134/56 (!) 147/62 (!) 153/55   Pulse: 79 79 79 84   Resp: 18 18 18 16   Temp: 98.1 °F (36.7 °C) 98 °F (36.7 °C) 98.6 °F (37 °C) 98.1 °F (36.7 °C)   TempSrc: Oral Oral Oral Oral   SpO2: 97% 94% 94% 95%   Weight:       Height:         Weight: Weight: 157 lb 6 oz (71.4 kg)     24 hour intake/output:  Intake/Output Summary (Last 24 hours) at 12/30/2022 0840  Last data filed at 12/30/2022 0226  Gross per 24 hour   Intake 350 ml   Output --   Net 350 ml         General appearance:  No apparent distress, appears stated age and cooperative. HEENT:  Normal cephalic, atraumatic without obvious deformity. Pupils equal, round, and reactive to light. Extra ocular muscles intact. Conjunctivae/corneas clear. Neck: Supple, with full range of motion. No jugular venous distention. Trachea midline. Respiratory:  Normal respiratory effort.  Clear to auscultation, bilaterally without Rales/Wheezes/Rhonchi. Cardiovascular:  Regular rate and rhythm with normal S1/S2 without murmurs, rubs or gallops. Abdomen: Soft, non-tender, non-distended with normal bowel sounds. Musculoskeletal:  No clubbing, cyanosis or edema bilaterally. Full range of motion without deformity. Skin: Skin color, texture, turgor normal.  No rashes or lesions. Neurologic:  Neurovascularly intact without any focal sensory/motor deficits. Cranial nerves: II-XII intact, grossly non-focal.  Psychiatric:  Alert and oriented, thought content appropriate, normal insight  Capillary Refill: Brisk,< 3 seconds   Peripheral Pulses: +2 palpable, equal bilaterally       Labs: For convenience and continuity at follow-up the following most recent labs are provided:      CBC:    Lab Results   Component Value Date/Time    WBC 7.1 12/30/2022 05:46 AM    HGB 10.3 12/30/2022 05:46 AM    HCT 32.3 12/30/2022 05:46 AM     12/30/2022 05:46 AM       Renal:    Lab Results   Component Value Date/Time     12/30/2022 05:46 AM    K 3.6 12/30/2022 05:46 AM     12/30/2022 05:46 AM    CO2 25 12/30/2022 05:46 AM    BUN 10 12/30/2022 05:46 AM    CREATININE 0.5 12/30/2022 05:46 AM    CALCIUM 8.1 12/30/2022 05:46 AM         Significant Diagnostic Studies    Radiology:   VL DUP LOWER EXTREMITY VENOUS LEFT   Final Result   Normal venous ultrasound. No evidence for acute deep venous thrombosis. **This report has been created using voice recognition software. It may contain minor errors which are inherent in voice recognition technology. **      Final report electronically signed by Dr. Coco Muir on 12/27/2022 3:53 PM      CT HEAD WO CONTRAST   Final Result   Impression:   Mild motion limited examination. No acute intracranial pathology. Cerebral volume loss with chronic microvascular gliosis in the    periventricular white matter. This document has been electronically signed by: Angelo Al on    12/27/2022 05:09 AM      All CTs at this facility use dose modulation techniques and iterative    reconstructions, and/or weight-based dosing   when appropriate to reduce radiation to a low as reasonably achievable. CT CSpine W/O Contrast   Final Result   No CT evidence of acute cervical spinal injury. Multilevel chronic degenerative disc disease and arthropathy. This document has been electronically signed by: Prem Vogis Deanne on    12/27/2022 05:11 AM      All CTs at this facility use dose modulation techniques and iterative    reconstructions, and/or weight-based dosing   when appropriate to reduce radiation to a low as reasonably achievable. XR ANKLE RIGHT (MIN 3 VIEWS)   Final Result   No acute bony abnormality. This document has been electronically signed by: Prem Vogis Deanne on    12/27/2022 06:52 AM      XR CHEST PORTABLE   Final Result   Impression:   Cardiomegaly with prominent interstitial markings and mild central    pulmonary venous congestion favoring low-grade interstitial edema from    congestive heart failure. Left basilar subsegmental atelectasis. This document has been electronically signed by: Prem Vogis Deanne on    12/27/2022 06:55 AM      XR PELVIS (1-2 VIEWS)   Final Result      No acute bony abnormality. Chronic degenerative changes. Status post right hip arthroplasty. This document has been electronically signed by: Prem Vogis Deanne on    12/27/2022 06:50 AM      XR ANKLE LEFT (MIN 3 VIEWS)   Final Result   No acute bony abnormality. This document has been electronically signed by: Prem Hogue  Jaki Deanne on    12/27/2022 06:57 AM             Consults:     IP CONSULT TO SOCIAL WORK  IP CONSULT TO HOME CARE NEEDS    Disposition:    [x] Home       [] TCU       [] Rehab       [] Psych       [] SNF       [] [facility]       [] Other-    Condition at Discharge: Stable    Code Status:  Full Code     Patient Instructions: Discharge lab work: Activity: activity as tolerated  Diet: ADULT DIET; Regular      Follow-up visits:   Elizabeth Iverson DO  [facility] [address]  [phone]    Schedule an appointment as soon as possible for a visit      Elizabeth IversonKseniaangie [address]  [phone]               Discharge Medications:        Medication List        START taking these medications      amoxicillin-clavulanate 500-125 MG per tablet  Commonly known as: Augmentin  Take 1 tablet by mouth 3 times daily for 5 days            CONTINUE taking these medications      acetaminophen 325 MG tablet  Commonly known as: TYLENOL     calcium carbonate-vitamin D 600-400 MG-UNIT Tabs per tab  Commonly known as: Calcium 600 + D  Take 1 tablet by mouth 2 times daily     donepezil 5 MG tablet  Commonly known as: ARICEPT  take 1 tablet by mouth ONCE NIGHTLY. Handicap Placard Misc  by Does not apply route Expires 10/30/25     Handicap Placard Misc  by Does not apply route Expires 4/28/27     mirabegron 50 MG Tb24  Commonly known as: MYRBETRIQ  Take 50 mg by mouth daily     psyllium 28.3 % Powd powder  Commonly known as: Metamucil  1 tablespoon in 8 oz water daily by mouth     T.E.D. Anti-Embolism Stockings Misc  Knee high SAMMY hose. Please measure pt for appropriate size. Wear during daytime, take off at night. therapeutic multivitamin-minerals tablet  Take 1 tablet by mouth daily     traZODone 50 MG tablet  Commonly known as: DESYREL  Take 0.5 tablets by mouth nightly as needed for Sleep     triamcinolone 0.025 % cream  Commonly known as: KENALOG     * Walker Misc  Wheeled walker with seat, use as directed     * Wheelchair Misc  Use as directed     * Commode Bedside Misc  Use as directed           * This list has 3 medication(s) that are the same as other medications prescribed for you. Read the directions carefully, and ask your doctor or other care provider to review them with you.                    Where to Get Your Medications        These medications were sent to [address], [address] [facility] [address] [facility]  [address] [facility], [place] [address]      Phone: [phone]   amoxicillin-clavulanate 500-125 MG per tablet         Time Spent on discharge is more than 45 minutes in the examination, evaluation, counseling and review of medications and discharge plan. Signed: Thank you Erika Duque DO for the opportunity to be involved in this patient's care.     Electronically signed by Lv Millan DO on 12/30/2022 at 8:40 AM

## 2022-12-30 NOTE — CARE COORDINATION
12/30/22, 7:30 AM EST    DISCHARGE ON GOING EVALUATION    Maykel Don 42 day: 3  Location: 8A-06/006-A Reason for admit: Fall at home, initial encounter [W19. Earlene Springer, V04.515]   Procedure:   12/27: CXR: Cardiomegaly with prominent interstitial markings and mild central pulmonary venous congestion favoring low-grade interstitial edema from congestive heart failure. Left basilar subsegmental atelectasis  12/27: CT head: Mild motion limited examination. No acute intracranial pathology. Cerebral volume loss with chronic microvascular gliosis in the   periventricular white matter. 12/27: Venous doppler LLE: No acute DVT  12/27: ECHO: EF 65%    Barriers to Discharge: Hospitalist following. PT/OT. Rocephin iv daily. Lovenox. Anticipate discharge to AL today. PCP: Khadijah Turner DO  Readmission Risk Score: 13.1%  Patient Goals/Plan/Treatment Preferences: Planning return to [facility] today with new HH. SW following.

## 2022-12-30 NOTE — DISCHARGE INSTR - COC
Continuity of Care Form    Patient Name: Marisol Park   :  1930  MRN:  [id number]    Admit date:  2022  Discharge date:  2022      Code Status Order: Full Code   Advance Directives:     Admitting Physician:  No admitting provider for patient encounter. PCP: Zabrina William DO    Discharging Nurse: Sandra Cevallos Unit/Room#: 8A-06/006-A  Discharging Unit Phone Number: [phone]    Emergency Contact:   Extended Emergency Contact Information  Primary Emergency Contact: Salian Ambrose  Address: [facility]            [address] Phone: [phone]  Mobile Phone: [phone]  Relation: Child  Hearing or visual needs: None  Other needs: None  Preferred language: [place]   needed? No  Secondary Emergency Contact: Job Diez  Address: [place]  [address]           [address] Phone: [phone]  Work Phone: [phone]  Mobile Phone: [phone]  Relation: Child   needed?  No    Past Surgical History:  Past Surgical History:   Procedure Laterality Date    BLADDER SUSPENSION  1968    FOOT SURGERY Right 1970s    HYSTERECTOMY (CERVIX STATUS UNKNOWN)  1968    JOINT REPLACEMENT Left     left knee    JOINT REPLACEMENT Right     hip    MOHS SURGERY  14    NOSE    [place] OFFICE/OUTPT VISIT,PROCEDURE ONLY N/A 2018    MOHS DEFECT REPAIR BCC DORSUM OF NOSE performed by Chelly Rosenbaum MD at [address] [place] [address]      small blockage-adhesion       Immunization History:   Immunization History   Administered Date(s) Administered    COVID-19, PFIZER Bivalent BOOSTER, DO NOT Dilute, (age 12y+), IM, 30 mcg/0.3 mL 11/10/2022    COVID-19, PFIZER GRAY top, DO NOT Dilute, (age 15 y+), IM, 30 mcg/0.3 mL 2022    COVID-19, PFIZER PURPLE top, DILUTE for use, (age 15 y+), 30mcg/0.3mL 2021, 2021, 2021    Influenza Vaccine, unspecified formulation 10/01/2016    Influenza Virus Vaccine 10/05/2015, 12/01/2017    Influenza, High Dose (Fluzone 65 yrs and older) 10/21/2019    Pneumococcal Conjugate 13-valent (Yamini Axe) 10/05/2015    Pneumococcal Polysaccharide (Enfmkhwsq10) 08/12/2014       Active Problems:  Patient Active Problem List   Diagnosis Code    Osteoarthritis of hip M16.9    Hyperlipidemia E78.5    Fatty liver K76.0    Colitis K52.9    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 Gloria Abraham, Y92.009       Isolation/Infection:   Isolation            No Isolation          Patient Infection Status       None to display            Nurse Assessment:  Last Vital Signs: BP (!) 158/64   Pulse 88   Temp 98.1 °F (36.7 °C) (Oral)   Resp 18   Ht 5' (1.524 m)   Wt 157 lb 6 oz (71.4 kg)   SpO2 98%   BMI 30.74 kg/m²     Last documented pain score (0-10 scale): Pain Level: 0  Last Weight:   Wt Readings from Last 1 Encounters:   12/29/22 157 lb 6 oz (71.4 kg)     Mental Status:  disoriented and alert    IV Access:  - None    Nursing Mobility/ADLs:  Walking   Assisted  Transfer  Assisted  Bathing  Assisted  Dressing  Assisted  Toileting  Assisted  Feeding  Assisted  Med Admin  Assisted  Med Delivery   whole    Wound Care Documentation and Therapy:  Incision 05/31/17 Nose Left; Lower; Lateral (Active)   Number of days: 2038       Incision 09/19/18 Head (Active)   Number of days: 1562        Elimination:  Continence:    Bowel: Yes  Bladder: Yes  Urinary Catheter: None   Colostomy/Ileostomy/Ileal Conduit: No       Date of Last BM: 12/30/2022      Intake/Output Summary (Last 24 hours) at 12/30/2022 1121  Last data filed at 12/30/2022 0226  Gross per 24 hour   Intake 350 ml   Output --   Net 350 ml I/O last 3 completed shifts: In: 350 [P.O.:350]  Out: -     Safety Concerns: At Risk for Falls    Impairments/Disabilities:      None    Nutrition Therapy:  Current Nutrition Therapy:   - Oral Diet:  General    Routes of Feeding: Oral  Liquids: Thin Liquids  Daily Fluid Restriction: no  Last Modified Barium Swallow with Video (Video Swallowing Test): not done    Treatments at the Time of Hospital Discharge:   Respiratory Treatments: none  Oxygen Therapy:  is not on home oxygen therapy. Ventilator:    - No ventilator support    Rehab Therapies: Physical Therapy and Occupational Therapy  Weight Bearing Status/Restrictions: No weight bearing restrictions  Other Medical Equipment (for information only, NOT a DME order):  wheelchair and walker  Other Treatments: n/a    Patient's personal belongings (please select all that are sent with patient):  Pt. Belongings at time of admission are shoes and night gown. RN SIGNATURE:  Electronically signed by Lawyer Denver, RN on 12/30/22 at 11:31 AM EST    CASE MANAGEMENT/SOCIAL WORK SECTION    Inpatient Status Date: ***    Readmission Risk Assessment Score:  Readmission Risk              Risk of Unplanned Readmission:  15           Discharging to Facility/ Agency   Name:   Address:  Phone:  Fax:    Dialysis Facility (if applicable)   Name:  Address:  Dialysis Schedule:  Phone:  Fax:    / signature: {Esignature:[id number]}    PHYSICIAN SECTION    Prognosis: {Prognosis:4544775276}    Condition at Discharge: [facility] Patient Condition:977693524}    Rehab Potential (if transferring to Rehab): {Prognosis:3871924649}    Recommended Labs or Other Treatments After Discharge: ***    Physician Certification: I certify the above information and transfer of Shivam Paulson  is necessary for the continuing treatment of the diagnosis listed and that she requires {Admit to Appropriate Level of Care:19949} for {GREATER/LESS:861593426} 30 days.      Update Admission H&P: {CHP DME Changes in GBOCR:982132688}    PHYSICIAN SIGNATURE:  {Esignature:[id number]}

## 2022-12-30 NOTE — PROGRESS NOTES
Writer was awaiting for kimberly Santana to come to pt. Room to pick her up for discharge and pt. Son did not come up to room, so writer called pt. Kimberly Santana and went over all discharge instructions which included education, follow-up appointment as well as all of her medications she took while here and what medications to  at the out-patient pharmacy on campus. All questions answered.

## 2022-12-30 NOTE — PROGRESS NOTES
[address] [place] [address]  INPATIENT PHYSICAL THERAPY  DAILY NOTE  [facility] MED SURG 8A - 8A-06/006-A      Time In: 6437  Time Out: 0800  Timed Code Treatment Minutes: 25 Minutes  Minutes: 25          Date: 2022  Patient Name: Anai Hayden,  Gender:  female        MRN: [id number]  : 1930  (80 y.o.)     Referring Practitioner: Shanti Lauren MD  Diagnosis: Fall at home, initial encounter  Additional Pertinent Hx: Per EMR \"80 y.o. female who presented to [address] [place] [address] from nursing home for evaluation of unwitnessed fall. PMHx significant for dementia. History obtained by chart review, patient unable to give me any history at this time. patient uses a walker to ambulate but did not use it at the time of fall. The fall was unwitnessed but staff heard her fall and they responded. There was no loss of consciousness, patient did not hit her head. Patient was complaining of left lower extremity on presentation. Patient is hard of hearing. She does deny chest pain or shortness of breath. Unable to tell me where she is at, which she is aware to self. She does report burning on urination. \" no acute fracture of head abnormalities. Prior Level of Function:  Type of Home: Assisted living  Home Layout: One level  Home Access: Level entry  Home Equipment: Rollator        ADL Assistance: Needs assistance  Homemaking Assistance:  (assist for housekeeping and meal prep)  Ambulation Assistance: Independent  Transfer Assistance: Independent  Active : No  IADL Comments: per grand daughter, pt does not like to take showers, has assist for ADL tasks. Additional Comments: Per pt report, she was getting up and walking with a walker independently. Questionable historian.     Restrictions/Precautions:  Restrictions/Precautions: General Precautions, Fall Risk  Position Activity Restriction  Other position/activity restrictions: hx of dementia       SUBJECTIVE: nursing ok'd therapy, pt oriented to her name only but followed directions    PAIN: 0/10:       Vitals: Oxygen: pt on room air and sats > 92% with mobility however she was a little SOB     OBJECTIVE:  Bed Mobility:  Not tested    Transfers:  Sit to Stand: Minimal Assistance, from recliner and took 2 attempts both trials  Stand to [facility] 68, however poor control on descend    Ambulation:  Contact Guard Assistance initially however progressed to SBA   Distance: 140x1  Surface: Level Tile  Device:Rolling Walker  Gait Deviations:  slow selam and flexed posture with decreased step length and lacking heel strike at lurdes LEs       Exercise:  Patient was guided in 2 set(s) 10 reps of exercise to both lower extremities. Ankle pumps, Seated marches, Seated hamstring curls, and Long arc quads. Exercises were completed for increased independence with functional mobility. Functional Outcome Measures: Completed  AM-PAC Inpatient Mobility without Stair Climbing Raw Score : 15  AM-PAC Inpatient without Stair Climbing T-Scale Score : 43.03    ASSESSMENT:  Assessment: Patient progressing toward established goals. Activity Tolerance:  Patient tolerance of  treatment: good. Equipment Recommendations:Equipment Needed: No  Discharge Recommendations:  return to AL, she may benefit from home health therapy   Plan: Current Treatment Recommendations: Strengthening, Gait training, Functional mobility training, Transfer training, Neuromuscular re-education, Balance training, Therapeutic activities, Patient/Caregiver education & training, Safety education & training, Endurance training, Equipment evaluation, education, & procurement  General Plan:  (5x GM)    Patient Education  Patient Education: Plan of Care    Goals:  Patient Goals : return home  Short Term Goals  Time Frame for Short Term Goals: by discharge  Short Term Goal 1: Pt will demo sit to/from stand tranfers with RW for support and S for safety to progress with mobility.   Short Term Goal 2: Pt will amb for 40 feet with RW for support to progress towards PLOF. Short Term Goal 3: Pt will tolerate 10-20 reps of ther ex to increase overall mobility. Long Term Goals  Time Frame for Long Term Goals : NA due to short ELOS    Following session, patient left in safe position with all fall risk precautions in place.

## 2022-12-30 NOTE — CARE COORDINATION
12/30/22, 2:48 PM EST    Patient goals/plan/ treatment preferences discussed by  and . Patient goals/plan/ treatment preferences reviewed with patient/ family. Patient/ family verbalize understanding of discharge plan and are in agreement with goal/plan/treatment preferences. Understanding was demonstrated using the teach back method. AVS provided by RN at time of discharge, which includes all necessary medical information pertaining to the patients current course of illness, treatment, post-discharge goals of care, and treatment preferences. Services At/After Discharge: Assisted Living, OT, and PT       IMM Letter  IMM Letter given to Patient/Family/Significant other/Guardian/POA/by[de-identified] ELIZABETH Li  IMM Letter date given[de-identified] 12/29/22  IMM Letter time given[de-identified] 5424     Patient discharged to return to [facility]. Spoke with Shellie at [facility] & Jose Martin informed of discharge. Will fax AVS and MAR and RN will call report. Spoke with daughter Melanie Doyle, informed of discharge. She stated her brother Yazan Vernon will transport, call him when she is ready.   RN aware

## 2022-12-30 NOTE — PLAN OF CARE
Problem: Pain  Goal: Verbalizes/displays adequate comfort level or baseline comfort level  12/30/2022 1527 by Adela Homans, RN  Outcome: Progressing    Problem: Safety - Adult  Goal: Free from fall injury  12/30/2022 1527 by Adela Homans, RN  Outcome: Progressing

## 2022-12-31 PROBLEM — R65.21 SEPTIC SHOCK (HCC): Status: ACTIVE | Noted: 2022-12-31

## 2022-12-31 PROBLEM — A41.9 SEPTIC SHOCK (HCC): Status: ACTIVE | Noted: 2022-12-31

## 2023-01-01 LAB
BLOOD CULTURE, ROUTINE: NORMAL
BLOOD CULTURE, ROUTINE: NORMAL

## 2023-01-03 ENCOUNTER — OFFICE VISIT (OUTPATIENT)
Dept: FAMILY MEDICINE CLINIC | Age: 88
End: 2023-01-03

## 2023-01-03 ENCOUNTER — CARE COORDINATION (OUTPATIENT)
Dept: CASE MANAGEMENT | Age: 88
End: 2023-01-03

## 2023-01-03 VITALS
RESPIRATION RATE: 16 BRPM | TEMPERATURE: 97.1 F | BODY MASS INDEX: 30.74 KG/M2 | SYSTOLIC BLOOD PRESSURE: 148 MMHG | OXYGEN SATURATION: 95 % | HEIGHT: 60 IN | HEART RATE: 84 BPM | DIASTOLIC BLOOD PRESSURE: 62 MMHG

## 2023-01-03 DIAGNOSIS — Z09 HOSPITAL DISCHARGE FOLLOW-UP: ICD-10-CM
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
## 2023-01-03 DIAGNOSIS — L03.116 CELLULITIS OF LEFT LOWER EXTREMITY: Primary | ICD-10-CM
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
## 2023-01-03 DIAGNOSIS — F03.90 DEMENTIA WITHOUT BEHAVIORAL DISTURBANCE (HCC): ICD-10-CM

## 2023-01-03 RX ORDER — TRAMADOL HYDROCHLORIDE 50 MG/1
50 TABLET ORAL EVERY 6 HOURS PRN
Qty: 20 TABLET | Refills: 0 | Status: SHIPPED | OUTPATIENT
Start: 2023-01-03 | End: 2023-01-08

## 2023-01-03 RX ORDER — CLINDAMYCIN HYDROCHLORIDE 300 MG/1
300 CAPSULE ORAL 3 TIMES DAILY
Qty: 30 CAPSULE | Refills: 0 | Status: ON HOLD | OUTPATIENT
Start: 2023-01-03 | End: 2023-01-07 | Stop reason: HOSPADM

## 2023-01-03 NOTE — CARE COORDINATION
[facility] Care Transitions Initial Follow Up Call    Call within 2 business days of discharge: Yes    Care Transition Nurse contacted the caregiver by telephone to perform post hospital discharge assessment. Verified name and  with caregiver as identifiers. Provided introduction to self, and explanation of the Care Transition Nurse role. Patient: Courtney George Patient : 1930   MRN: [id number]  Reason for Admission: Fall  Discharge Date: 22 RARS: Readmission Risk Score: 13.5      Last Discharge  Street       Date Complaint Diagnosis Description Type Department Provider    22 Fall Septic shock ([facility] [address] . .. ED to Hosp-Admission (Discharged) (ADMITTED) [facility], DO; [facility]. .. Challenges to be reviewed by the provider   Additional needs identified to be addressed with provider: No  none               Method of communication with provider: none. Spoke with Angie @ [facility], said Edwin Montes is doing ok. She is out with family to appt at walk in clinic with PCP. Has not had any falls since discharge. Reviewed medications/changes, taking as directed. No c/o dysuria or symptoms of UTI. Eating and drinking fluids. AL staff manage her medications, meals and assists with ADLs. Edwin Montes uses a walker and sometimes a wheelchair. Denies any needs. No other questions or concerns at this time. No further calls needed since AL manage her care. Care Transition Nurse reviewed discharge instructions, medical action plan, and red flags with caregiver who verbalized understanding. The caregiver was given an opportunity to ask questions and does not have any further questions or concerns at this time. Were discharge instructions available to patient? Yes. Reviewed appropriate site of care based on symptoms and resources available to patient including: PCP  Specialist  Urgent care clinics  When to call 911.  The caregiver agrees to contact the PCP office for questions related to their healthcare. Advance Care Planning:   Does patient have an Advance Directive: not on file. Medication reconciliation was performed with caregiver, who verbalizes understanding of administration of home medications. Medications reviewed, 1111F entered: yes    Was patient discharged with a pulse oximeter? no    Non-face-to-face services provided:  Scheduled appointment with PCP-1/3  Obtained and reviewed discharge summary and/or continuity of care documents    Offered patient enrollment in the Remote Patient Monitoring (RPM) program for in-home monitoring: NA.    Care Transitions 24 Hour Call    Schedule Follow Up Appointment with PCP: Completed  Do you have a copy of your discharge instructions?: Yes  Do you have all of your prescriptions and are they filled?: Yes  Have you been contacted by a [facility] Pharmacist?: No  Have you scheduled your follow up appointment?: Yes  How are you going to get to your appointment?: Car - family or friend to transport  Patient DME: Glenroy Eduardo you have support at home?: Alone  Do you feel like you have everything you need to keep you well at home?: Yes  Are you an active caregiver in your home?: No  Care Transitions Interventions     Transportation Support: Declined            Follow Up  No future appointments. Care Transition Nurse provided contact information. No further follow-up call indicated based on severity of symptoms and risk factors.       Tiff Ovalle RN

## 2023-01-03 NOTE — PROGRESS NOTES
Transition of Care Visit/Hospital Follow Up:      Courtney George is a 80 y.o. female that presents for Follow-Up from Hospital and Leg Pain        Date of Discharge:   12/30/22  Was patient contacted within 2 business days of discharge (see chart for documentation):  yes - today      Patient presents for hospital follow up. Patient recently hospitalized at [address] [place] [address] for treatment of UTI. Symptoms prior to admission:  confusion     Hospital Course per DC Summary:    Hospital Course:   Courtney George is a 80 y.o. female admitted to [facility] on 12/27/2022 for sepsis due to uti. Sepsis -meets 3/4 SIRS criteria (elevated HR > 90, elevated RR > 20, elevated WBC) + UTI as source of infection. Lactic acid elevated. S/p 2L IVF. S/p Rocephin 1 g x1  Plan -await urine culture, continue Rocephin every 24 hours, pending culture results to narrow down antibiotic coverage. Will initiate gentle IV fluid hydration at 50 cc/h for 12 hours as patient has evidence of cardiomegaly with prominent interstitial markings and mild central pulmonary venous congestion favoring low-grade interstitial edema from congestive heart failure on chest x-ray. Will reassess fluid status. Urinary tract infection -UA positive for UTI (elevated WBC, positive nitrate, +leukocyte esterase, +bacteria), pending culture. Symptomatic. Continue Rocephin daily. Pulmonary vascular congestion -seen on chest x-ray. Patient does not have history of heart failure, last echo 8/2017 EF 60%. Patient is not hypoxic, on room air with SPO2 above 90%. Patient does not appear fluid overloaded on physical exam. She received 2 L of IV fluids for treatment of sepsis/dehydration. Plan -we will obtain full echo to evaluate heart structure and function. Gentle IV fluid hydration for 12 hours and then reassess fluid status. Daily weights. Strict intake and output. Hyponatremia, mild -sodium level 134, serum osmolality 271.   Secondary to dehydration. Continue to monitor sodium level with daily BMP     Unwitnessed fall -no loss of consciousness/head trauma/fractures. Patient uses a walker at baseline but did not use her walker while ambulating to the bathroom. Consult PT OT. Dementia -resume Aricept 5 mg daily     LLE cellulitis will monitor patient on Rocephin for urinary tract infection. Medication changes at discharge:  Med list reconciled today    Clinical course since discharge:  Still having a lot of LLE edema and erythema. Has been getting more tender and erythmeatous. No ulcerations or fever. Pain is waking her up at night. Urinary sx are wnl. No dysuria, frequency. Current Outpatient Medications   Medication Sig Dispense Refill    clindamycin (CLEOCIN) 300 MG capsule Take 1 capsule by mouth 3 times daily for 10 days 30 capsule 0    traMADol (ULTRAM) 50 MG tablet Take 1 tablet by mouth every 6 hours as needed for Pain for up to 5 days. Max Daily Amount: 200 mg 20 tablet 0    amoxicillin-clavulanate (AUGMENTIN) 500-125 MG per tablet Take 1 tablet by mouth 3 times daily for 5 days 15 tablet 0    Misc. Devices (COMMODE BEDSIDE) MISC Use as directed 1 each 0    Misc. Devices [facility]) MISC Use as directed 1 each 0    Handicap Placard MISC by Does not apply route Expires 4/28/27 1 each 0    triamcinolone (KENALOG) 0.025 % cream apply to affected area OF LOWER LIP once daily      Elastic Bandages & Supports (T.E.D. ANTI-EMBOLISM STOCKINGS) MISC Knee high SAMMY hose. Please measure pt for appropriate size. Wear during daytime, take off at night. 1 each 1    Handicap Placard MISC by Does not apply route Expires 10/30/25 1 each 0    Misc.  Devices (WALKER) MISC Wheeled walker with seat, use as directed 1 each 0    acetaminophen (TYLENOL) 325 MG tablet Take 650 mg by mouth every 6 hours as needed for Pain      calcium carbonate-vitamin D (CALCIUM 600 + D) 600-400 MG-UNIT TABS per tab Take 1 tablet by mouth 2 times daily 60 tablet 5    Multiple Vitamins-Minerals (THERAPEUTIC MULTIVITAMIN-MINERALS) tablet Take 1 tablet by mouth daily 30 tablet 11    traZODone (DESYREL) 50 MG tablet Take 0.5 tablets by mouth nightly as needed for Sleep 15 tablet 5    mirabegron (MYRBETRIQ) 50 MG TB24 Take 50 mg by mouth daily 30 tablet 3    donepezil (ARICEPT) 5 MG tablet take 1 tablet by mouth ONCE NIGHTLY. 90 tablet 3    Psyllium (METAMUCIL) 28.3 % POWD 1 tablespoon in 8 oz water daily by mouth 1365 g 3     No current facility-administered medications for this visit. Past Medical History:   Diagnosis Date    Arthritis     Cancer ([facility] [address]     skin    Colitis     Colon polyps     Fatty liver     resolved per patient    Hearing loss of both ears     Hyperlipidemia     Varicose vein of leg     Varicosity        Past Surgical History:   Procedure Laterality Date    BLADDER SUSPENSION  1968    FOOT SURGERY Right 1970s    HYSTERECTOMY (CERVIX STATUS UNKNOWN)  1968    JOINT REPLACEMENT Left     left knee    JOINT REPLACEMENT Right     hip    MOHS SURGERY  1/22/14    NOSE    [place] OFFICE/OUTPT VISIT,PROCEDURE ONLY N/A 9/19/2018    MOHS DEFECT REPAIR BCC DORSUM OF NOSE performed by Brionna Mcqueen MD at [address] [place] [address]  2008    small blockage-adhesion       Social History     Tobacco Use    Smoking status: Never    Smokeless tobacco: Never   Substance Use Topics    Alcohol use: Yes     Comment: rare    Drug use: No       Family History   Problem Relation Age of Onset    Arthritis Mother     Arthritis Father     Cancer Father         lung    Stroke Father     Diabetes Sister          I have reviewed the patient's past medical history, past surgical history, allergies, medications, social and family history and I have made updates where appropriate.         PHYSICAL EXAM:  BP (!) 148/62   Pulse 84   Temp 97.1 °F (36.2 °C) (Infrared)   Resp 16   Ht 5' (1.524 m)   SpO2 95%   BMI 30.74 kg/m²   General Appearance: well developed and well- nourished, in no acute distress  Head: normocephalic and atraumatic  ENT: external ear and ear canal normal bilaterally, nose without deformity, nasal mucosa and turbinates normal without polyps, oropharynx normal, dentition is normal for age, no lip or gum lesions noted  Neck: supple and non-tender without mass, no thyromegaly or thyroid nodules, no cervical lymphadenopathy  Pulmonary/Chest: clear to auscultation bilaterally- no wheezes, rales or rhonchi, normal air movement, no respiratory distress or retractions  Cardiovascular: normal rate, regular rhythm, normal S1 and S2, no murmurs, rubs, clicks, or gallops, distal pulses intact  Abdomen: soft, non-tender, non-distended, no rebound or guarding, no masses or hernias noted, no hepatospleenomegaly  Psych:  Normal affect without evidence of depression or anxiety, insight and judgement are appropriate, memory appears intact  Skin: warm and dry, no rash or erythema    LLE with diffuse edema and erythema, the anterior leg is tender and extends above the knee    Diagnostic test results reviewed: inpatient labs    Patient risk of morbidity and mortality: moderate      ASSESSMENT & PLAN  Chencho Lopez was seen today for follow-up from hospital and leg pain. Diagnoses and all orders for this visit:    Cellulitis of left lower extremity  -     clindamycin (CLEOCIN) 300 MG capsule; Take 1 capsule by mouth 3 times daily for 10 days  -     traMADol (ULTRAM) 50 MG tablet; Take 1 tablet by mouth every 6 hours as needed for Pain for up to 5 days. Max Daily Amount: 200 mg    Dementia without behavioral disturbance ([address]      Return in about 2 days (around 1/5/2023).     Level of medical complexity:  moderate    -Add clindamycin to augmentin, return in 2 days, we called [facility] and asked them to update us if any worsening sx  -Continue current meds  -Advised to continue to closely monitor her symptoms and if any worsening to seek treatment    All copied or forwarded information in the progress note was verified by me to be accurate at the time of visit  Patient's past medical, surgical, social and family history were reviewed and updated      All patient questions answered. Patient voiced understanding.

## 2023-01-05 ENCOUNTER — OFFICE VISIT (OUTPATIENT)
Dept: FAMILY MEDICINE CLINIC | Age: 88
End: 2023-01-05

## 2023-01-05 ENCOUNTER — APPOINTMENT (OUTPATIENT)
Dept: INTERVENTIONAL RADIOLOGY/VASCULAR | Age: 88
DRG: 602 | End: 2023-01-05
Payer: MEDICARE

## 2023-01-05 ENCOUNTER — APPOINTMENT (OUTPATIENT)
Dept: GENERAL RADIOLOGY | Age: 88
DRG: 602 | End: 2023-01-05
Payer: MEDICARE

## 2023-01-05 ENCOUNTER — HOSPITAL ENCOUNTER (INPATIENT)
Age: 88
LOS: 2 days | Discharge: NURSING, REHAB OR LONG-TERM CARE | DRG: 602 | End: 2023-01-07
Attending: EMERGENCY MEDICINE | Admitting: STUDENT IN AN ORGANIZED HEALTH CARE EDUCATION/TRAINING PROGRAM
Payer: MEDICARE

## 2023-01-05 ENCOUNTER — APPOINTMENT (OUTPATIENT)
Dept: ULTRASOUND IMAGING | Age: 88
DRG: 602 | End: 2023-01-05
Payer: MEDICARE

## 2023-01-05 VITALS
DIASTOLIC BLOOD PRESSURE: 64 MMHG | SYSTOLIC BLOOD PRESSURE: 106 MMHG | RESPIRATION RATE: 16 BRPM | HEART RATE: 89 BPM | OXYGEN SATURATION: 96 % | TEMPERATURE: 97 F

## 2023-01-05 DIAGNOSIS — L03.116 CELLULITIS OF LEFT LOWER EXTREMITY: Primary | ICD-10-CM

## 2023-01-05 LAB
ANION GAP SERPL CALCULATED.3IONS-SCNC: 13 MEQ/L (ref 8–16)
BASOPHILS # BLD: 0.5 %
BASOPHILS ABSOLUTE: 0 THOU/MM3 (ref 0–0.1)
BUN BLDV-MCNC: 8 MG/DL (ref 7–22)
CALCIUM SERPL-MCNC: 8.9 MG/DL (ref 8.5–10.5)
CHLORIDE BLD-SCNC: 101 MEQ/L (ref 98–111)
CO2: 23 MEQ/L (ref 23–33)
CREAT SERPL-MCNC: 0.6 MG/DL (ref 0.4–1.2)
EOSINOPHIL # BLD: 2.6 %
EOSINOPHILS ABSOLUTE: 0.2 THOU/MM3 (ref 0–0.4)
ERYTHROCYTE [DISTWIDTH] IN BLOOD BY AUTOMATED COUNT: 15.9 % (ref 11.5–14.5)
ERYTHROCYTE [DISTWIDTH] IN BLOOD BY AUTOMATED COUNT: 57.7 FL (ref 35–45)
GFR SERPL CREATININE-BSD FRML MDRD: > 60 ML/MIN/1.73M2
GLUCOSE BLD-MCNC: 98 MG/DL (ref 70–108)
HCT VFR BLD CALC: 35.7 % (ref 37–47)
HEMOGLOBIN: 11.6 GM/DL (ref 12–16)
IMMATURE GRANS (ABS): 0.05 THOU/MM3 (ref 0–0.07)
IMMATURE GRANULOCYTES: 0.7 %
LACTIC ACID: 0.8 MMOL/L (ref 0.5–2)
LYMPHOCYTES # BLD: 27.2 %
LYMPHOCYTES ABSOLUTE: 2 THOU/MM3 (ref 1–4.8)
MAGNESIUM: 2.2 MG/DL (ref 1.6–2.4)
MCH RBC QN AUTO: 31.5 PG (ref 26–33)
MCHC RBC AUTO-ENTMCNC: 32.5 GM/DL (ref 32.2–35.5)
MCV RBC AUTO: 97 FL (ref 81–99)
MONOCYTES # BLD: 13.3 %
MONOCYTES ABSOLUTE: 1 THOU/MM3 (ref 0.4–1.3)
NUCLEATED RED BLOOD CELLS: 0 /100 WBC
OSMOLALITY CALCULATION: 272.1 MOSMOL/KG (ref 275–300)
PLATELET # BLD: 388 THOU/MM3 (ref 130–400)
PMV BLD AUTO: 9.5 FL (ref 9.4–12.4)
POTASSIUM SERPL-SCNC: 4.3 MEQ/L (ref 3.5–5.2)
PRO-BNP: 289.3 PG/ML (ref 0–449)
RBC # BLD: 3.68 MILL/MM3 (ref 4.2–5.4)
REASON FOR REJECTION: NORMAL
REJECTED TEST: NORMAL
SEG NEUTROPHILS: 55.7 %
SEGMENTED NEUTROPHILS ABSOLUTE COUNT: 4.1 THOU/MM3 (ref 1.8–7.7)
SODIUM BLD-SCNC: 137 MEQ/L (ref 135–145)
WBC # BLD: 7.4 THOU/MM3 (ref 4.8–10.8)

## 2023-01-05 PROCEDURE — 99222 1ST HOSP IP/OBS MODERATE 55: CPT | Performed by: STUDENT IN AN ORGANIZED HEALTH CARE EDUCATION/TRAINING PROGRAM

## 2023-01-05 PROCEDURE — 83735 ASSAY OF MAGNESIUM: CPT

## 2023-01-05 PROCEDURE — 87070 CULTURE OTHR SPECIMN AEROBIC: CPT

## 2023-01-05 PROCEDURE — 71045 X-RAY EXAM CHEST 1 VIEW: CPT

## 2023-01-05 PROCEDURE — 1200000003 HC TELEMETRY R&B

## 2023-01-05 PROCEDURE — 85025 COMPLETE CBC W/AUTO DIFF WBC: CPT

## 2023-01-05 PROCEDURE — 83880 ASSAY OF NATRIURETIC PEPTIDE: CPT

## 2023-01-05 PROCEDURE — 93970 EXTREMITY STUDY: CPT

## 2023-01-05 PROCEDURE — 6360000002 HC RX W HCPCS: Performed by: STUDENT IN AN ORGANIZED HEALTH CARE EDUCATION/TRAINING PROGRAM

## 2023-01-05 PROCEDURE — 96374 THER/PROPH/DIAG INJ IV PUSH: CPT

## 2023-01-05 PROCEDURE — 80048 BASIC METABOLIC PNL TOTAL CA: CPT

## 2023-01-05 PROCEDURE — 87636 SARSCOV2 & INF A&B AMP PRB: CPT

## 2023-01-05 PROCEDURE — 72170 X-RAY EXAM OF PELVIS: CPT

## 2023-01-05 PROCEDURE — 99285 EMERGENCY DEPT VISIT HI MDM: CPT

## 2023-01-05 PROCEDURE — 93005 ELECTROCARDIOGRAM TRACING: CPT | Performed by: STUDENT IN AN ORGANIZED HEALTH CARE EDUCATION/TRAINING PROGRAM

## 2023-01-05 PROCEDURE — 87040 BLOOD CULTURE FOR BACTERIA: CPT

## 2023-01-05 PROCEDURE — 36415 COLL VENOUS BLD VENIPUNCTURE: CPT

## 2023-01-05 PROCEDURE — 76882 US LMTD JT/FCL EVL NVASC XTR: CPT

## 2023-01-05 PROCEDURE — 83605 ASSAY OF LACTIC ACID: CPT

## 2023-01-05 PROCEDURE — 87641 MR-STAPH DNA AMP PROBE: CPT

## 2023-01-05 PROCEDURE — 2580000003 HC RX 258: Performed by: STUDENT IN AN ORGANIZED HEALTH CARE EDUCATION/TRAINING PROGRAM

## 2023-01-05 RX ORDER — ONDANSETRON 4 MG/1
4 TABLET, ORALLY DISINTEGRATING ORAL EVERY 8 HOURS PRN
Status: DISCONTINUED | OUTPATIENT
Start: 2023-01-05 | End: 2023-01-07 | Stop reason: HOSPADM

## 2023-01-05 RX ORDER — ACETAMINOPHEN 325 MG/1
650 TABLET ORAL EVERY 6 HOURS PRN
Status: DISCONTINUED | OUTPATIENT
Start: 2023-01-05 | End: 2023-01-07 | Stop reason: HOSPADM

## 2023-01-05 RX ORDER — SODIUM CHLORIDE 0.9 % (FLUSH) 0.9 %
5-40 SYRINGE (ML) INJECTION PRN
Status: DISCONTINUED | OUTPATIENT
Start: 2023-01-05 | End: 2023-01-07 | Stop reason: HOSPADM

## 2023-01-05 RX ORDER — POLYETHYLENE GLYCOL 3350 17 G/17G
17 POWDER, FOR SOLUTION ORAL DAILY PRN
Status: DISCONTINUED | OUTPATIENT
Start: 2023-01-05 | End: 2023-01-07 | Stop reason: HOSPADM

## 2023-01-05 RX ORDER — SODIUM CHLORIDE 0.9 % (FLUSH) 0.9 %
5-40 SYRINGE (ML) INJECTION EVERY 12 HOURS SCHEDULED
Status: DISCONTINUED | OUTPATIENT
Start: 2023-01-05 | End: 2023-01-07 | Stop reason: HOSPADM

## 2023-01-05 RX ORDER — ACETAMINOPHEN 650 MG/1
650 SUPPOSITORY RECTAL EVERY 6 HOURS PRN
Status: DISCONTINUED | OUTPATIENT
Start: 2023-01-05 | End: 2023-01-07 | Stop reason: HOSPADM

## 2023-01-05 RX ORDER — TROSPIUM CHLORIDE 20 MG/1
20 TABLET, FILM COATED ORAL
Status: DISCONTINUED | OUTPATIENT
Start: 2023-01-06 | End: 2023-01-07 | Stop reason: HOSPADM

## 2023-01-05 RX ORDER — TRAZODONE HYDROCHLORIDE 50 MG/1
25 TABLET ORAL NIGHTLY PRN
Status: DISCONTINUED | OUTPATIENT
Start: 2023-01-05 | End: 2023-01-07 | Stop reason: HOSPADM

## 2023-01-05 RX ORDER — OXYCODONE HYDROCHLORIDE AND ACETAMINOPHEN 5; 325 MG/1; MG/1
1 TABLET ORAL ONCE
Status: COMPLETED | OUTPATIENT
Start: 2023-01-05 | End: 2023-01-06

## 2023-01-05 RX ORDER — ENOXAPARIN SODIUM 100 MG/ML
40 INJECTION SUBCUTANEOUS DAILY
Status: DISCONTINUED | OUTPATIENT
Start: 2023-01-05 | End: 2023-01-07 | Stop reason: HOSPADM

## 2023-01-05 RX ORDER — ONDANSETRON 2 MG/ML
4 INJECTION INTRAMUSCULAR; INTRAVENOUS EVERY 6 HOURS PRN
Status: DISCONTINUED | OUTPATIENT
Start: 2023-01-05 | End: 2023-01-07 | Stop reason: HOSPADM

## 2023-01-05 RX ORDER — DONEPEZIL HYDROCHLORIDE 10 MG/1
10 TABLET, FILM COATED ORAL NIGHTLY
Status: DISCONTINUED | OUTPATIENT
Start: 2023-01-05 | End: 2023-01-07 | Stop reason: HOSPADM

## 2023-01-05 RX ORDER — TRAMADOL HYDROCHLORIDE 50 MG/1
50 TABLET ORAL EVERY 6 HOURS PRN
Status: DISCONTINUED | OUTPATIENT
Start: 2023-01-05 | End: 2023-01-07 | Stop reason: HOSPADM

## 2023-01-05 RX ORDER — SODIUM CHLORIDE 9 MG/ML
INJECTION, SOLUTION INTRAVENOUS PRN
Status: DISCONTINUED | OUTPATIENT
Start: 2023-01-05 | End: 2023-01-07 | Stop reason: HOSPADM

## 2023-01-05 RX ADMIN — VANCOMYCIN HYDROCHLORIDE 1000 MG: 1 INJECTION, POWDER, LYOPHILIZED, FOR SOLUTION INTRAVENOUS at 18:04

## 2023-01-05 ASSESSMENT — ENCOUNTER SYMPTOMS
WHEEZING: 0
CHOKING: 0
EYE DISCHARGE: 0
SINUS PRESSURE: 0
NAUSEA: 0
CHEST TIGHTNESS: 0
ABDOMINAL PAIN: 0
ABDOMINAL DISTENTION: 0
COLOR CHANGE: 1
SHORTNESS OF BREATH: 0
STRIDOR: 0
SORE THROAT: 0
CONSTIPATION: 0
APNEA: 0
COUGH: 1
DIARRHEA: 0
BLOOD IN STOOL: 0
BACK PAIN: 1

## 2023-01-05 ASSESSMENT — PAIN - FUNCTIONAL ASSESSMENT: PAIN_FUNCTIONAL_ASSESSMENT: WONG-BAKER FACES

## 2023-01-05 ASSESSMENT — PAIN SCALES - WONG BAKER: WONGBAKER_NUMERICALRESPONSE: 10

## 2023-01-05 NOTE — ED PROVIDER NOTES
[address] [facility] [address] [address] EMERGENCY DEPT      EMERGENCY MEDICINE     Pt Name: Michele Sheikh  MRN: [id number]  Yinggfrafael 5/25/1930  Date of evaluation: 1/5/2023  Provider: Karen Sarmiento MD  Supervising Physician: Matthieu Devries       Chief Complaint   Patient presents with    Leg Pain     bilateral     History obtained from unobtainable from patient due to dementia. HISTORY OF PRESENT ILLNESS   Michele Sheikh is a pleasant 80 y.o. female who presents to the emergency department from from nursing home, by private vehicle for evaluation of left leg redness. Patient recently discharged from septic shock admission, urosepsis, was also noted to have left lower leg cellulitis, discharged home on Augmentin. Has had persistent worsening of erythema, swelling, pain. Now extending about 8 inches above previous demarcation line. States that both legs hurt, denies any numbness, weakness. Per son, patient at mental baseline. Denies any fevers, chills, dysuria, frequency, urgency, any recent falls. Denies any additional complaints. Pertinent ROS included above. 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 XXXA, Y92.009    Septic shock ([address] A41.9, R65.21     SURGICAL HISTORY       Past Surgical History:   Procedure Laterality Date BLADDER SUSPENSION  1968    FOOT SURGERY Right 1970s    HYSTERECTOMY (CERVIX STATUS UNKNOWN)  1968    JOINT REPLACEMENT Left     left knee    JOINT REPLACEMENT Right     hip    MOHS SURGERY  1/22/14    NOSE    MA OFFICE/OUTPT VISIT,PROCEDURE ONLY N/A 9/19/2018    MOHS DEFECT REPAIR BCC DORSUM OF NOSE performed by Lizet Rueda MD at [address] [place] [address] Road  2008    small blockage-adhesion       CURRENT MEDICATIONS       Previous Medications    ACETAMINOPHEN (TYLENOL) 325 MG TABLET    Take 650 mg by mouth every 6 hours as needed for Pain    CALCIUM CARBONATE-VITAMIN D (CALCIUM 600 + D) 600-400 MG-UNIT TABS PER TAB    Take 1 tablet by mouth 2 times daily    CLINDAMYCIN (CLEOCIN) 300 MG CAPSULE    Take 1 capsule by mouth 3 times daily for 10 days    DONEPEZIL (ARICEPT) 5 MG TABLET    take 1 tablet by mouth ONCE NIGHTLY. ELASTIC BANDAGES & SUPPORTS (T.E.D. ANTI-EMBOLISM STOCKINGS) MISC    Knee high SAMMY hose. Please measure pt for appropriate size. Wear during daytime, take off at night. HANDICAP PLACARD MISC    by Does not apply route Expires 10/30/25    HANDICAP PLACARD MISC    by Does not apply route Expires 4/28/27    MIRABEGRON (MYRBETRIQ) 50 MG TB24    Take 50 mg by mouth daily    MISC. DEVICES (COMMODE BEDSIDE) MISC    Use as directed    MISC. DEVICES (WALKER) MISC    Wheeled walker with seat, use as directed    MISC. DEVICES (WHEELCHAIR) MISC    Use as directed    MULTIPLE VITAMINS-MINERALS (THERAPEUTIC MULTIVITAMIN-MINERALS) TABLET    Take 1 tablet by mouth daily    PSYLLIUM (METAMUCIL) 28.3 % POWD    1 tablespoon in 8 oz water daily by mouth    TRAMADOL (ULTRAM) 50 MG TABLET    Take 1 tablet by mouth every 6 hours as needed for Pain for up to 5 days.  Max Daily Amount: 200 mg    TRAZODONE (DESYREL) 50 MG TABLET    Take 0.5 tablets by mouth nightly as needed for Sleep    TRIAMCINOLONE (KENALOG) 0.025 % CREAM    apply to affected area OF LOWER LIP once daily       ALLERGIES is allergic to sulfa antibiotics. FAMILY HISTORY     She indicated that her mother is . She indicated that her father is . She indicated that the status of her sister is unknown. SOCIAL HISTORY       Social History     Tobacco Use    Smoking status: Never    Smokeless tobacco: Never   Substance Use Topics    Alcohol use: Yes     Comment: rare    Drug use: No       PHYSICAL EXAM       ED Triage Vitals   BP Temp Temp Source Heart Rate Resp SpO2 Height Weight   23 1640 23 1640 23 1640 23 1640 23 1640 23 1640 -- 23 1645   (!) 156/68 98.2 °F (36.8 °C) Oral 76 24 96 %  157 lb (71.2 kg)       Additional Vital Signs:  Vitals:    23 1753   BP: (!) 153/68   Pulse: 78   Resp: 20   Temp:    SpO2: 96%     Physical Exam  Constitutional:       Appearance: Normal appearance. She is ill-appearing (Chronically ill-appearing). HENT:      Head: Normocephalic and atraumatic. Right Ear: External ear normal.      Left Ear: External ear normal.      Nose: Nose normal.      Mouth/Throat:      Mouth: Mucous membranes are moist.      Pharynx: Oropharynx is clear. Eyes:      Conjunctiva/sclera: Conjunctivae normal.      Pupils: Pupils are equal, round, and reactive to light. Cardiovascular:      Rate and Rhythm: Normal rate and regular rhythm. Pulmonary:      Effort: Pulmonary effort is normal. No respiratory distress. Breath sounds: Normal breath sounds. No wheezing or rales. Chest:      Chest wall: No tenderness. Abdominal:      General: Abdomen is flat. Bowel sounds are normal. There is no distension. Palpations: Abdomen is soft. Tenderness: There is no abdominal tenderness. There is no guarding or rebound. Musculoskeletal:         General: Tenderness present. No signs of injury. Right lower leg: Edema present. Left lower leg: Edema present.       Comments: Erythema of left lower leg, extending up medial portion of left leg, significantly above previous line of demarcation. Dopplerable pulses bilateral DP   Skin:     General: Skin is warm and dry. Capillary Refill: Capillary refill takes less than 2 seconds. Coloration: Skin is not pale. Findings: Erythema present. Neurological:      Mental Status: She is alert. Mental status is at baseline. She is disoriented. FORMAL DIAGNOSTIC RESULTS     RADIOLOGY: Interpretation per the Radiologist below, if available at the time of this note (none if blank):    VL DUP LOWER EXTREMITY VENOUS BILATERAL    (Results Pending)       LABS: (none if blank)  Labs Reviewed   CBC WITH AUTO DIFFERENTIAL - Abnormal; Notable for the following components:       Result Value    RBC 3.68 (*)     Hemoglobin 11.6 (*)     Hematocrit 35.7 (*)     RDW-CV 15.9 (*)     RDW-SD 57.7 (*)     All other components within normal limits   CULTURE, BLOOD 1   CULTURE, BLOOD 1   LACTIC ACID   SPECIMEN REJECTION   BASIC METABOLIC PANEL   MAGNESIUM       (Any cultures that may have been sent were not resulted at the time of this patient visit)    [facility] / ED COURSE:     Assessment and Plan: This is a 68-year-old female, history of Alzheimer's, recently discharged from septic shock, presenting with failed outpatient treatment for left lower extremity cellulitis. Patient had gotten Rocephin on previous admission urosepsis, discharged with Amox for left lower extremity cellulitis. Physical exam significant for substantial erythematous indurated streaking up left medial leg beyond previously marked demarcation line. Labs grossly unremarkable. Lactate within normal limits. Cultures pending. Will order repeat DVT study, as previously was 2 weeks ago, no [facility] . would like to admit this patient for left lower leg cellulitis with failure of outpatient treatment. Has been given [facility]         ED Reassessment: Continues to be comfortable in bed.               Shared Decision-Making was performed and disposition discussed with the        Patient/Family and questions answered         Code Status: Full          Vitals Reviewed:    Vitals:    01/05/23 1640 01/05/23 1645 01/05/23 1753   BP: (!) 156/68  (!) 153/68   Pulse: 76  78   Resp: 24  20   Temp: 98.2 °F (36.8 °C)     TempSrc: Oral     SpO2: 96%  96%   Weight:  157 lb (71.2 kg)        The patient was seen and examined. Appropriate diagnostic testing was performed and results reviewed with the patient. Nursing notes reviewed. The results of pertinent diagnostic studies and exam findings were discussed. The patients provisional diagnosis and plan of care were discussed with the patient and present family who expressed understanding. Any medications were reviewed and indications and risks of medications were discussed with the patient /family present. ED Medications administered this visit:  (None if blank)  Medications   vancomycin (VANCOCIN) 1,000 mg in sodium chloride 0.9 % 250 mL IVPB ([id number]) (1,000 mg IntraVENous New Bag 1/5/23 5833)         DISCHARGE PRESCRIPTIONS: (None if blank)  New Prescriptions    No medications on file       FINAL IMPRESSION      1. Cellulitis of left lower extremity          DISPOSITION/PLAN     Final diagnoses:   Cellulitis of left lower extremity     Condition: condition: good  Dispo: Admit to med/surg floor          This transcription was electronically signed. Parts of this transcriptions may have been dictated by use of voice recognition software and electronically transcribed, and parts may have been transcribed with the assistance of an ED scribe. The transcription may contain errors not detected in proofreading. Please refer to my supervising physician's documentation if my documentation differs.     Electronically Signed: Kayla Gutiérrez MD, 01/05/23, 6:41 PM        Kayla Gutiérrez MD  Resident  01/05/23 9133

## 2023-01-05 NOTE — PROGRESS NOTES
Reece Leyva is a 80 y.o. female thatpresents for Follow-up (Leg is still bothering her)      History obtained today from Patient. Comes in today with son for recheck of cellulitis  Has gotten worse   Pt pain uncontrolled  Reports lots of redness and swelling  Pt poor historian, unable to answer most of my questions      I have reviewed the patient's past medical history, past surgical history, allergies,medications, social and family history and I have made updates where appropriate. Past Medical History:   Diagnosis Date    Arthritis     Cancer ([address]     skin    Colitis     Colon polyps     Fatty liver     resolved per patient    Hearing loss of both ears     Hyperlipidemia     Varicose vein of leg     Varicosity        Social History     Tobacco Use    Smoking status: Never    Smokeless tobacco: Never   Substance Use Topics    Alcohol use: Yes     Comment: rare    Drug use: No       Family History   Problem Relation Age of Onset    Arthritis Mother     Arthritis Father     Cancer Father         lung    Stroke Father     Diabetes Sister          Review of Systems        PHYSICAL EXAM:  /64   Pulse 89   Temp 97 °F (36.1 °C) (Infrared)   Resp 16   SpO2 96%     Physical Exam  Constitutional:       Appearance: Normal appearance. HENT:      Head: Normocephalic and atraumatic. Right Ear: External ear normal.      Left Ear: External ear normal.      Nose: Nose normal.      Mouth/Throat:      Mouth: Mucous membranes are moist.   Eyes:      Conjunctiva/sclera: Conjunctivae normal.   Cardiovascular:      Rate and Rhythm: Normal rate and regular rhythm. Pulses: Normal pulses. Heart sounds: Normal heart sounds. Pulmonary:      Effort: Pulmonary effort is normal.      Breath sounds: Normal breath sounds. Abdominal:      General: Bowel sounds are normal.      Palpations: Abdomen is soft. Musculoskeletal:         General: Normal range of motion.       Cervical back: Normal range of motion. Skin:     General: Skin is warm and dry. Findings: Erythema present. Comments: LLE erythematous and swollen  Very tender to touch  Pedal pulse 1+  Toes are dusky  Cap refill decreased   Neurological:      General: No focal deficit present. Mental Status: She is alert and oriented to person, place, and time. Psychiatric:         Mood and Affect: Mood normal.         Behavior: Behavior normal.         ASSESSMENT & PLAN  Rachele Downing was seen today for follow-up. Diagnoses and all orders for this visit:    Cellulitis of left lower extremity    Recommend referral to ER given her uncontrolled pain, worsening cellulitis, and changes in color of her left foot    Gave report to Emily Prajapati RN at [facility] ER  Pt going to ER by private vehicle, son is with her     No follow-ups on file. All copied or forwarded information in the progress note was verified by me to be accurate at the time of visit  Patient's past medical, surgical, social and family history were reviewed and updated     All patient questions answered. Patient voiced understanding.

## 2023-01-05 NOTE — ED TRIAGE NOTES
Presents to ER with concern of leg cellulitis. Left leg red warm and swollen. Leg painful to touch. Pt reports pain is the worst she has ever felt. Reports pain in bilateral legs.  Will monitor

## 2023-01-06 PROBLEM — M25.551 RIGHT HIP PAIN: Status: ACTIVE | Noted: 2023-01-06

## 2023-01-06 PROBLEM — F03.90 DEMENTIA (HCC): Status: ACTIVE | Noted: 2023-01-06

## 2023-01-06 PROBLEM — N32.81 OVERACTIVE BLADDER: Status: ACTIVE | Noted: 2023-01-06

## 2023-01-06 PROBLEM — D64.9 NORMOCYTIC ANEMIA: Status: ACTIVE | Noted: 2023-01-06

## 2023-01-06 PROBLEM — R05.1 ACUTE COUGH: Status: ACTIVE | Noted: 2023-01-06

## 2023-01-06 LAB
ANION GAP SERPL CALCULATED.3IONS-SCNC: 9 MEQ/L (ref 8–16)
BACTERIA: NORMAL
BASOPHILS # BLD: 0.5 %
BASOPHILS ABSOLUTE: 0 THOU/MM3 (ref 0–0.1)
BILIRUBIN URINE: NEGATIVE
BLOOD, URINE: NEGATIVE
BUN BLDV-MCNC: 6 MG/DL (ref 7–22)
CALCIUM SERPL-MCNC: 7.7 MG/DL (ref 8.5–10.5)
CASTS: NORMAL /LPF
CASTS: NORMAL /LPF
CHARACTER, URINE: CLEAR
CHLORIDE BLD-SCNC: 103 MEQ/L (ref 98–111)
CO2: 24 MEQ/L (ref 23–33)
COLOR: YELLOW
CREAT SERPL-MCNC: 0.5 MG/DL (ref 0.4–1.2)
CRYSTALS: NORMAL
EKG ATRIAL RATE: 75 BPM
EKG P AXIS: 61 DEGREES
EKG P-R INTERVAL: 178 MS
EKG Q-T INTERVAL: 398 MS
EKG QRS DURATION: 68 MS
EKG QTC CALCULATION (BAZETT): 444 MS
EKG R AXIS: 22 DEGREES
EKG T AXIS: 82 DEGREES
EKG VENTRICULAR RATE: 75 BPM
EOSINOPHIL # BLD: 3.3 %
EOSINOPHILS ABSOLUTE: 0.2 THOU/MM3 (ref 0–0.4)
EPITHELIAL CELLS, UA: NORMAL /HPF
ERYTHROCYTE [DISTWIDTH] IN BLOOD BY AUTOMATED COUNT: 15.8 % (ref 11.5–14.5)
ERYTHROCYTE [DISTWIDTH] IN BLOOD BY AUTOMATED COUNT: 56 FL (ref 35–45)
GFR SERPL CREATININE-BSD FRML MDRD: > 60 ML/MIN/1.73M2
GLUCOSE BLD-MCNC: 92 MG/DL (ref 70–108)
GLUCOSE, URINE: NEGATIVE MG/DL
HCT VFR BLD CALC: 31.1 % (ref 37–47)
HEMOGLOBIN: 10.1 GM/DL (ref 12–16)
IMMATURE GRANS (ABS): 0.04 THOU/MM3 (ref 0–0.07)
IMMATURE GRANULOCYTES: 0.7 %
INFLUENZA A: NOT DETECTED
INFLUENZA B: NOT DETECTED
KETONES, URINE: NEGATIVE
LEUKOCYTE ESTERASE, URINE: NEGATIVE
LYMPHOCYTES # BLD: 26.8 %
LYMPHOCYTES ABSOLUTE: 1.5 THOU/MM3 (ref 1–4.8)
MCH RBC QN AUTO: 31.1 PG (ref 26–33)
MCHC RBC AUTO-ENTMCNC: 32.5 GM/DL (ref 32.2–35.5)
MCV RBC AUTO: 95.7 FL (ref 81–99)
MISCELLANEOUS LAB TEST RESULT: NORMAL
MONOCYTES # BLD: 16 %
MONOCYTES ABSOLUTE: 0.9 THOU/MM3 (ref 0.4–1.3)
MRSA SCREEN RT-PCR: NEGATIVE
NITRITE, URINE: NEGATIVE
NUCLEATED RED BLOOD CELLS: 0 /100 WBC
OSMOLALITY CALCULATION: 269.2 MOSMOL/KG (ref 275–300)
PH UA: 7.5 (ref 5–9)
PLATELET # BLD: 330 THOU/MM3 (ref 130–400)
PMV BLD AUTO: 9.1 FL (ref 9.4–12.4)
POTASSIUM REFLEX MAGNESIUM: 3.9 MEQ/L (ref 3.5–5.2)
PROTEIN UA: NEGATIVE MG/DL
RBC # BLD: 3.25 MILL/MM3 (ref 4.2–5.4)
RBC URINE: NORMAL /HPF
RENAL EPITHELIAL, UA: NORMAL
SARS-COV-2 RNA, RT PCR: NOT DETECTED
SEG NEUTROPHILS: 52.7 %
SEGMENTED NEUTROPHILS ABSOLUTE COUNT: 2.9 THOU/MM3 (ref 1.8–7.7)
SODIUM BLD-SCNC: 136 MEQ/L (ref 135–145)
SPECIFIC GRAVITY UA: 1.01 (ref 1–1.03)
UROBILINOGEN, URINE: 0.2 EU/DL (ref 0–1)
WBC # BLD: 5.5 THOU/MM3 (ref 4.8–10.8)
WBC UA: NORMAL /HPF
YEAST: NORMAL

## 2023-01-06 PROCEDURE — 6370000000 HC RX 637 (ALT 250 FOR IP): Performed by: STUDENT IN AN ORGANIZED HEALTH CARE EDUCATION/TRAINING PROGRAM

## 2023-01-06 PROCEDURE — 2580000003 HC RX 258: Performed by: STUDENT IN AN ORGANIZED HEALTH CARE EDUCATION/TRAINING PROGRAM

## 2023-01-06 PROCEDURE — 6360000002 HC RX W HCPCS: Performed by: STUDENT IN AN ORGANIZED HEALTH CARE EDUCATION/TRAINING PROGRAM

## 2023-01-06 PROCEDURE — 36415 COLL VENOUS BLD VENIPUNCTURE: CPT

## 2023-01-06 PROCEDURE — 81001 URINALYSIS AUTO W/SCOPE: CPT

## 2023-01-06 PROCEDURE — 85025 COMPLETE CBC W/AUTO DIFF WBC: CPT

## 2023-01-06 PROCEDURE — 93010 ELECTROCARDIOGRAM REPORT: CPT | Performed by: NUCLEAR MEDICINE

## 2023-01-06 PROCEDURE — 99232 SBSQ HOSP IP/OBS MODERATE 35: CPT | Performed by: PHYSICIAN ASSISTANT

## 2023-01-06 PROCEDURE — 80048 BASIC METABOLIC PNL TOTAL CA: CPT

## 2023-01-06 PROCEDURE — 1200000003 HC TELEMETRY R&B

## 2023-01-06 RX ORDER — AMOXICILLIN AND CLAVULANATE POTASSIUM 500; 125 MG/1; MG/1
1 TABLET, FILM COATED ORAL 2 TIMES DAILY
Status: ON HOLD | COMMUNITY
End: 2023-01-07 | Stop reason: HOSPADM

## 2023-01-06 RX ADMIN — CEFAZOLIN 2000 MG: 10 INJECTION, POWDER, FOR SOLUTION INTRAVENOUS at 06:06

## 2023-01-06 RX ADMIN — ACETAMINOPHEN 650 MG: 325 TABLET ORAL at 20:23

## 2023-01-06 RX ADMIN — TRAMADOL HYDROCHLORIDE 50 MG: 50 TABLET, COATED ORAL at 16:21

## 2023-01-06 RX ADMIN — SODIUM CHLORIDE, PRESERVATIVE FREE 10 ML: 5 INJECTION INTRAVENOUS at 20:20

## 2023-01-06 RX ADMIN — TRAZODONE HYDROCHLORIDE 25 MG: 50 TABLET ORAL at 20:23

## 2023-01-06 RX ADMIN — PSYLLIUM HUSK 1 PACKET: 3.4 GRANULE ORAL at 10:19

## 2023-01-06 RX ADMIN — CEFAZOLIN 2000 MG: 10 INJECTION, POWDER, FOR SOLUTION INTRAVENOUS at 15:19

## 2023-01-06 RX ADMIN — CEFAZOLIN 2000 MG: 10 INJECTION, POWDER, FOR SOLUTION INTRAVENOUS at 00:26

## 2023-01-06 RX ADMIN — SODIUM CHLORIDE 1000 ML: 9 INJECTION, SOLUTION INTRAVENOUS at 00:25

## 2023-01-06 RX ADMIN — ACETAMINOPHEN 650 MG: 325 TABLET ORAL at 11:24

## 2023-01-06 RX ADMIN — OXYCODONE HYDROCHLORIDE AND ACETAMINOPHEN 1 TABLET: 5; 325 TABLET ORAL at 00:27

## 2023-01-06 RX ADMIN — CEFAZOLIN 2000 MG: 10 INJECTION, POWDER, FOR SOLUTION INTRAVENOUS at 21:59

## 2023-01-06 RX ADMIN — DONEPEZIL HYDROCHLORIDE 10 MG: 10 TABLET, FILM COATED ORAL at 20:20

## 2023-01-06 RX ADMIN — TROSPIUM CHLORIDE 20 MG: 20 TABLET, FILM COATED ORAL at 17:07

## 2023-01-06 RX ADMIN — ENOXAPARIN SODIUM 40 MG: 100 INJECTION SUBCUTANEOUS at 10:19

## 2023-01-06 RX ADMIN — SODIUM CHLORIDE, PRESERVATIVE FREE 10 ML: 5 INJECTION INTRAVENOUS at 10:13

## 2023-01-06 RX ADMIN — SODIUM CHLORIDE, PRESERVATIVE FREE 10 ML: 5 INJECTION INTRAVENOUS at 00:24

## 2023-01-06 ASSESSMENT — PAIN DESCRIPTION - LOCATION
LOCATION: LEG

## 2023-01-06 ASSESSMENT — PAIN SCALES - GENERAL
PAINLEVEL_OUTOF10: 4
PAINLEVEL_OUTOF10: 6
PAINLEVEL_OUTOF10: 5

## 2023-01-06 ASSESSMENT — PAIN DESCRIPTION - DESCRIPTORS
DESCRIPTORS: ACHING
DESCRIPTORS: ACHING
DESCRIPTORS: DISCOMFORT

## 2023-01-06 ASSESSMENT — PAIN DESCRIPTION - ORIENTATION
ORIENTATION: LEFT

## 2023-01-06 NOTE — CARE COORDINATION
01/06/23 1354   Readmission Assessment   Number of Days since last admission? 1-7 days   Previous Disposition Other (comment)  ([facility]   Who is being Kelly Stevenshraddhas   (family)   Did you visit your Primary Care Physician after you left the hospital, before you returned this time? Yes   Did you see a specialist, such as Cardiac, Pulmonary, Orthopedic Physician, etc. after you left the hospital? No   Who advised the patient to return to the hospital? Other (Comment)  (Patient's son.)   Does the patient report anything that got in the way of taking their medications? No   In our efforts to provide the best possible care to you and others like you, can you think of anything that we could have done to help you after you left the hospital the first time, so that you might not have needed to return so soon?  Other (Comment)  (Patient's son, Kacy  feels she was discharged too soon.)

## 2023-01-06 NOTE — PROGRESS NOTES
BThank you for the consult,  Mathieu Alvarado [facility]  1/5/2023 10:05 PM    Disregard Note: Vancomycin cancelled by Dr. Norah Farias, PharmD 1/5/2023 10:10 PM

## 2023-01-06 NOTE — ED NOTES
[facility] Patient at vascular scan at this time.      Sharonda Ramirez RN  01/05/23 6323

## 2023-01-06 NOTE — CARE COORDINATION
Case Management Assessment  Initial Evaluation    Date/Time of Evaluation: 1/6/2023 10:57 AM  Assessment Completed by: Doris Lehman RN    If patient is discharged prior to next notation, then this note serves as note for discharge by case management. Patient Name: Jerod Luna                   YOB: 1930  Diagnosis: Cellulitis of left lower extremity [L03.116]                   Date / Time: 1/5/2023  4:32 PM  Location: [address] [place] [address]     Patient Admission Status: Inpatient   Readmission Risk (Low < 19, Mod (19-27), High > 27): Readmission Risk Score: 16.5    Current PCP: Isaias Devine, DO  PCP verified by CM? Yes    Chart Reviewed: Yes      History Provided by: Child/Family  Patient Orientation: Unable to Assess    Patient Cognition: Severely Impaired    Hospitalization in the last 30 days (Readmission):  Yes    If yes, Readmission Assessment in  Navigator will be completed. Advance Directives:      Code Status: Full Code   Patient's Primary Decision Maker is: Legal Next of Kin    Primary Decision Maker: Tariq Ambrosey  Child - [phone]    Discharge Planning:    Patient lives with: Other (Comment) [facility] Type of Home: Assisted living  Primary Care Giver: Private caregiver  Patient Support Systems include: Children   Current Financial resources: Medicare, Other (Comment) ([employer]  [employer])  Current community resources: Assisted Living  Current services prior to admission: Other (Comment) (Assisted Living)            Current DME:              Type of Home Care services:  [employer], Nursing Services    ADLS  Prior functional level: Assistance with the following:, Bathing, Dressing, Toileting, Feeding, Cooking, Housework, Shopping, Mobility  Current functional level: Assistance with the following:, Bathing, Dressing, Toileting, Feeding, Cooking, Housework, Shopping, Mobility    Family can provide assistance at DC: No  Would you like Case Management to discuss the discharge plan with any other family members/significant others, and if so, who? Yes  Plans to Return to Present Housing: Unknown at present  Other Identified Issues/Barriers to RETURNING to current housing: SS to verify facility can accept her back. Potential Assistance needed at discharge: Jose Gutierrez            Potential DME:    Patient expects to discharge to: Assisted living  Plan for transportation at discharge: Family    Financial    Payor: MEDICARE / Plan: MEDICARE PART A AND B / Product Type: *No Product type* /     Does insurance require precert for SNF: No    Potential assistance Purchasing Medications: No  Meds-to-Beds request:        [id number] - [place], [address] - F [phone]  [address] [address]  Phone: [phone] Fax: [phone] [address], [address] [facility] [address]  Phone: [phone] Fax: [phone]      Notes:    Factors facilitating achievement of predicted outcomes: Family support and Caregiver support    Barriers to discharge: Pain and IV antibiotics    Additional Case Management Notes: Patient presents with left lower extremity cellulitis and bilateral lower extremity pain. Ancef 2 8 hr., IV Vancomycin x 1 dose, Lovenox, prn pain medications and Zofran, daily BMP and CBC, NPO, acapella, incentive spirometry, telemetry, up with assistance. Procedure: N/A    The Plan for Transition of Care is related to the following treatment goals of Cellulitis of left lower extremity [L48.290]    Patient Goals/Plan/Treatment Preferences: Met with Cami's son Rebecca Baltazar and DIL present at bedside. They are considering moving Jagdish You to a SNF eventually. Rebecca Baltazar states they are waiting for Gabriela Prather (her daughter) to return from [place] before making this transition. Jagdish You has two elderly cats she keeps in her room. Rebecca Baltazar verifies her insurance and PCP.  He states [facility] takes care of all her needs. Shahriar Jasmine (her other son) is the decision maker while Bertha Michel is gone. Phone message left for Shahriar Jasmine that discharge is planned for tomorrow back to [facility]. Inquired on if he would be transporting. Shahriar Jasmine will transport [address] back to [facility]. He is concerned that she is being discharged too soon. SS to verify [facility] can accept her back. Transportation/Food Security/Housekeeping Addressed: No issues identified.      Shari Berry RN  Case Management Department

## 2023-01-06 NOTE — PROGRESS NOTES
Hospitalist Progress Note      Patient:  Rebecca Handy    Unit/Bed:5K-07/007-A  YOB: 1930  MRN: [id number]   Acct: [de-identified]   PCP: Santi Hernandez DO  Date of Admission: 1/5/2023    Assessment/Plan:    Cellulitis, LLE: Left leg has progressed. Very erythematous and edematous. Doppler negative. Recent discharge 12/30 on Augmentin. Pt is on Ancef now. Monitor. Afebrile. MRSA negative  Acute metabolic encephalopathy: Patient has baseline dementia. Pt was more confused than baseline per NH. Productive Cough: Flu negative COVID-negative. Chest x-ray shows mild edema. Anemia, chronic, normocytic: Patient's hemoglobin is at baseline. OAB: Myrbertriq. Chief Complaint: Left ankle pain and right hip pain     Initial H and P:-    Initial H&P \" Rebecca Handy is a 80 y.o., , female who presented to the emergency room for left ankle pain and right hip pain. 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 Per chart review patient was recently discharged on December 27 for sepsis secondary to UTI and cellulitis on left lower extremity. During last hospital stay patient reported unwitnessed fall at nursing home. Upon visit patient reports left lower extremity pain worsening on her ankle. Associated symptoms include pain with ambulation. In addition she also have right hip pain which has been going on since last month. Denies any fever, nausea, vomiting, pain in any other region of her body. No recent fall history. Patient is awake and alert to her person but not time and place. ED work-up: BP elevated. CBC show no leukocytosis but normocytic normochromic anemia. BMP wnl. (-) DVT bilateral Les. Vancomycin was started. \"     Subjective (past 24 hours):   Patient was admitted to the night. Pt having no new issues.  Pt states that her leg is in pain.       Past medical history, family history, social history and allergies reviewed again and is unchanged since admission. ROS (All review of systems completed. Pertinent positives noted. Otherwise All other systems reviewed and negative.)     Medications:  Reviewed    Infusion Medications    sodium chloride 1,000 mL (01/06/23 0025)     Scheduled Medications    sodium chloride flush  5-40 mL IntraVENous 2 times per day    enoxaparin  40 mg SubCUTAneous Daily    donepezil  10 mg Oral Nightly    trospium  20 mg Oral BID AC    psyllium  1 packet Oral Daily    ceFAZolin  2,000 mg IntraVENous Q8H     PRN Meds: sodium chloride flush, sodium chloride, ondansetron **OR** ondansetron, polyethylene glycol, acetaminophen **OR** acetaminophen, traMADol, traZODone      Intake/Output Summary (Last 24 hours) at 1/6/2023 1356  Last data filed at 1/6/2023 0408  Gross per 24 hour   Intake 0 ml   Output 500 ml   Net -500 ml       Diet:  ADULT DIET; Regular    Physical Exam:  BP (!) 157/63   Pulse 76   Temp 97.7 °F (36.5 °C) (Oral)   Resp 20   Wt 157 lb (71.2 kg)   SpO2 92%   BMI 30.66 kg/m²   General appearance: No apparent distress, appears stated age and cooperative. HEENT: Pupils equal, round, and reactive to light. Conjunctivae/corneas clear. Neck: Supple, with full range of motion. No jugular venous distention. Trachea midline. Respiratory:  Normal respiratory effort on RA. Clear to auscultation, bilaterally without Rales/Wheezes/Rhonchi. Cardiovascular: Regular rate and rhythm with normal S1/S2 without murmurs, rubs or gallops. Abdomen: Soft, non-tender, non-distended with normal bowel sounds. Musculoskeletal: passive and active ROM x 4 extremities. Skin: LLE erythema, edematous   Neurologic:  Neurovascularly intact without any focal sensory/motor deficits.  Cranial nerves: II-XII intact, grossly non-focal.  Psychiatric: Alert , conversationally confused   Capillary Refill: Brisk,< 3 seconds   Peripheral Pulses: +2 palpable, equal bilaterally     Labs:   Recent Labs     01/05/23  1719 01/06/23  0318   WBC 7.4 5.5   HGB 11.6* 10.1*   HCT 35.7* 31.1*    330     Recent Labs     01/05/23  1820 01/06/23  0318    136   K 4.3 3.9    103   CO2 23 24   BUN 8 6*   CREATININE 0.6 0.5   CALCIUM 8.9 7.7*     No results for input(s): AST, ALT, BILIDIR, BILITOT, ALKPHOS in the last 72 hours. No results for input(s): INR in the last 72 hours. No results for input(s): Naya Rowels in the last 72 hours. Microbiology:    Blood culture #1:   Lab Results   Component Value Date/Time    [facility]  12/27/2022 02:30 AM     No growth 24 hours. No growth 48 hours. No growth at 5 days       Blood culture #2:No results found for: BLOODCULT2    Organism:  Lab Results   Component Value Date/Time    ORG Escherichia coli 12/27/2022 02:30 AM       No results found for: LABGRAM    MRSA culture only:No results found for: [facility]    Urine culture:   Lab Results   Component Value Date/Time    LABURIN  01/15/2020 11:38 AM     No growth-preliminary Growth of Contaminants. The mixture of organisms present are not a common cause of urinary tract infections and probably represent skin tran or distal urethral tran. Respiratory culture: No results found for: CULTRESP    Aerobic and Anaerobic :  No results found for: LABAERO  No results found for: LABANAE    Urinalysis:      Lab Results   Component Value Date/Time    NITRU NEGATIVE 01/06/2023 04:05 AM    WBCUA 0-2 01/06/2023 04:05 AM    BACTERIA NONE SEEN 01/06/2023 04:05 AM    RBCUA NONE SEEN 01/06/2023 04:05 AM    BLOODU NEGATIVE 01/06/2023 04:05 AM    SPECGRAV 1.009 01/06/2023 04:05 AM    GLUCOSEU NEGATIVE 12/27/2022 02:30 AM       Radiology:  US EXTREMITY LEFT NON VASC LIMITED   Final Result   Impression:   Cellulitis in the interrogated regions of the left lower extremity. Negative for loculated fluid collection/abscess.       This document has been electronically signed by: Chandler Martinez MD on    01/05/2023 09:44 PM      XR CHEST PORTABLE   Final Result   Impression:   Cardiomegaly, with mild interstitial edema. This document has been electronically signed by: Chandler Martinez MD on    01/05/2023 09:46 PM      XR PELVIS (1-2 VIEWS)   Final Result   Limited evaluation findings discussed above. **This report has been created using voice recognition software. It may contain minor errors which are inherent in voice recognition technology. **      Final report electronically signed by Dr. Orestes Joseph on 1/5/2023 8:16 PM      VL DUP LOWER EXTREMITY VENOUS BILATERAL   Final Result   No evidence of a DVT. **This report has been created using voice recognition software. It may contain minor errors which are inherent in voice recognition technology. **      Final report electronically signed by Dr. Orestes Joseph on 1/5/2023 7:34 PM        Electronically signed by STU [place] on 1/6/2023 at 1:56 PM

## 2023-01-06 NOTE — PLAN OF CARE
Problem: Discharge Planning  Goal: Discharge to home or other facility with appropriate resources  1/6/2023 1242 by Claudean Nicolas, RN  Outcome: Progressing  Flowsheets (Taken 1/6/2023 1007)  Discharge to home or other facility with appropriate resources:   Identify barriers to discharge with patient and caregiver   Arrange for needed discharge resources and transportation as appropriate   Identify discharge learning needs (meds, wound care, etc)     Problem: Pain  Goal: Verbalizes/displays adequate comfort level or baseline comfort level  1/6/2023 1242 by Claudean Nicolas, RN  Outcome: Progressing  Flowsheets (Taken 1/6/2023 1000)  Verbalizes/displays adequate comfort level or baseline comfort level:   Encourage patient to monitor pain and request assistance   Assess pain using appropriate pain scale   Administer analgesics based on type and severity of pain and evaluate response   Implement non-pharmacological measures as appropriate and evaluate response   Consider cultural and social influences on pain and pain management     Problem: Safety - Adult  Goal: Free from fall injury  1/6/2023 1242 by Claudean Nicolas, RN  Outcome: Progressing     Problem: ABCDS Injury Assessment  Goal: Absence of physical injury  1/6/2023 1242 by Claudean Nicolas, RN  Outcome: Progressing     Problem: Skin/Tissue Integrity  Goal: Absence of new skin breakdown  Description: 1. Monitor for areas of redness and/or skin breakdown  2. Assess vascular access sites hourly  3. Every 4-6 hours minimum:  Change oxygen saturation probe site  4. Every 4-6 hours:  If on nasal continuous positive airway pressure, respiratory therapy assess nares and determine need for appliance change or resting period.   Outcome: Progressing     Problem: Chronic Conditions and Co-morbidities  Goal: Patient's chronic conditions and co-morbidity symptoms are monitored and maintained or improved  1/6/2023 1242 by Claudean Nicolas, RN  Outcome: Progressing  Flowsheets (Taken 1/6/2023 1007)  Care Plan - Patient's Chronic Conditions and Co-Morbidity Symptoms are Monitored and Maintained or Improved:   Monitor and assess patient's chronic conditions and comorbid symptoms for stability, deterioration, or improvement   Collaborate with multidisciplinary team to address chronic and comorbid conditions and prevent exacerbation or deterioration   Update acute care plan with appropriate goals if chronic or comorbid symptoms are exacerbated and prevent overall improvement and discharge   Care plan reviewed with patient and . Patient and  verbalize understanding of the plan of care and contribute to goal setting.

## 2023-01-06 NOTE — ED NOTES
Patient repositioned in bed, no currents signs of distress. Patients vitals are stable at this time.       Edelmira Dunne RN  01/05/23 1949

## 2023-01-06 NOTE — ED PROVIDER NOTES
PATIENT NAME: Lilly Rhoades  MRN: [id number]  : 1930  ALFRED: 2023    I performed a history and physical examination of the patient and discussed management with the Resident. I reviewed the Resident's note and agree with the documented findings and plan of care. Any areas of disagreement are noted on the chart. I was personally present for the key portions of any procedures and have documented in the chart those procedures where I was not present during the key portions. I have reviewed the emergency nurses triage note and agree with the chief complaint, past medical history, past surgical history, allergies, medications, social and family history as documented unless otherwise noted below. MEDICAL DEDISION MAKING (MDM)     Lilly Rhoades is a 80 y.o. female who presents to Emergency Department with Leg Pain (bilateral)     SNF patient. Patient is brought in private vehicle for evaluation of left lower leg cellulitis. Recent hospital admission for septic shock urosepsis, was noticed to have LLE cellulitis upon discharge, discharged home with Augmentin. Physical exam: AVSS. Diffuse LLE tenderness, erythema, and swelling suggesting cellulitis. LLE DVT study negative. Received Ancef and vancomycin in ED. Admitted by hospitalist service.   Vitals:    23 1940 23 2105 231 23 2142   BP: (!) 160/75 (!) 141/66 (!) 161/69 (!) 156/56   Pulse: 82 76 79 74   Resp:    Temp:  98.1 °F (36.7 °C) 97.7 °F (36.5 °C) 98.4 °F (36.9 °C)   TempSrc:  Oral Oral Oral   SpO2: 97% 95% 96% 98%   Weight:         Medications   sodium chloride flush 0.9 % injection 5-40 mL (has no administration in time range)   sodium chloride flush 0.9 % injection 5-40 mL (has no administration in time range)   0.9 % sodium chloride infusion (has no administration in time range)   enoxaparin (LOVENOX) injection 40 mg (has no administration in time range)   ondansetron (ZOFRAN-ODT) disintegrating tablet 4 mg (has no administration in time range)     Or   ondansetron (ZOFRAN) injection 4 mg (has no administration in time range)   polyethylene glycol (GLYCOLAX) packet 17 g (has no administration in time range)   acetaminophen (TYLENOL) tablet 650 mg (has no administration in time range)     Or   acetaminophen (TYLENOL) suppository 650 mg (has no administration in time range)   donepezil (ARICEPT) tablet 10 mg (has no administration in time range)   trospium (SANCTURA) tablet 20 mg (has no administration in time range)   psyllium (KONSYL) 28.3 % powder 3.4 g (has no administration in time range)   traMADol (ULTRAM) tablet 50 mg (has no administration in time range)   traZODone (DESYREL) tablet 25 mg (has no administration in time range)   oxyCODONE-acetaminophen (PERCOCET) 5-325 MG per tablet 1 tablet (has no administration in time range)   ceFAZolin (ANCEF) 2000 mg in dextrose 5 % 50 mL IVPB (has no administration in time range)   vancomycin (VANCOCIN) 1,000 mg in sodium chloride 0.9 % 250 mL IVPB ([id number]) (1,000 mg IntraVENous New Bag 1/5/23 1804)     Labs Reviewed   CBC WITH AUTO DIFFERENTIAL - Abnormal; Notable for the following components:       Result Value    RBC 3.68 (*)     Hemoglobin 11.6 (*)     Hematocrit 35.7 (*)     RDW-CV 15.9 (*)     RDW-SD 57.7 (*)     All other components within normal limits   OSMOLALITY - Abnormal; Notable for the following components:    Osmolality Calc 272.1 (*)     All other components within normal limits   CULTURE, BLOOD 1   CULTURE, BLOOD 1   CULTURE, MRSA, SCREENING   COVID-19 & INFLUENZA COMBO   MRSA BY PCR   LACTIC ACID   SPECIMEN REJECTION   BASIC METABOLIC PANEL   MAGNESIUM   ANION GAP   GLOMERULAR FILTRATION RATE, ESTIMATED   BASIC METABOLIC PANEL W/ REFLEX TO MG FOR LOW K   CBC WITH AUTO DIFFERENTIAL   URINALYSIS WITH REFLEX TO CULTURE   URINALYSIS WITH MICROSCOPIC   BRAIN NATRIURETIC PEPTIDE     US EXTREMITY LEFT NON VASC LIMITED   Final Result   Impression: Cellulitis in the interrogated regions of the left lower extremity. Negative for loculated fluid collection/abscess. This document has been electronically signed by: Kevin Cavanaugh MD on    01/05/2023 09:44 PM      XR CHEST PORTABLE   Final Result   Impression:   Cardiomegaly, with mild interstitial edema. This document has been electronically signed by: Kevin Cavanaugh MD on    01/05/2023 09:46 PM      XR PELVIS (1-2 VIEWS)   Final Result   Limited evaluation findings discussed above. **This report has been created using voice recognition software. It may contain minor errors which are inherent in voice recognition technology. **      Final report electronically signed by Dr. Rosalinda Cortez on 1/5/2023 8:16 PM      VL DUP LOWER EXTREMITY VENOUS BILATERAL   Final Result   No evidence of a DVT. **This report has been created using voice recognition software. It may contain minor errors which are inherent in voice recognition technology. **      Final report electronically signed by Dr. Rosalinda Cortez on 1/5/2023 7:34 PM          FINAL IMPRESSION AND DISPOSITION      1. Cellulitis of left lower extremity        DISPOSITION Admitted 01/05/2023 07:46:32 PM      PATIENT REFERRED TO:  No follow-up provider specified.     DISCHARGE MEDICATIONS:  Current Discharge Medication List          (Please note that portions of this note were completed with a voice recognition program.  Efforts were made to edit the dictations but occasionally words aremis-transcribed.)    MD Kit Velasquez MD  01/05/23 8597

## 2023-01-06 NOTE — H&P
Hospitalist - History & Physical      Patient: Elena Post    Unit/Bed:ANDREA /ANDREA  YOB: 1930  MRN: [id number]   Acct: [de-identified]   PCP: Rufino Sapp DO    Chief Complaint:  Left ankle pain and Right hip pain    Assessment and Plan:    # Cellulitis of Left lower extremity: Left LE swelling, erythema, tender. Recent dc 12/30 for cellulitis on Clindamycin. Remain tender. Dopper US (1/5/22) negative for DVT. Lactic acid wnl  - Plan: Order US extremity. Start Vancomycin for MRSA. Screen MRSA. Will deescalated abx if negative for abscess or MRSA    # Right hip pain: Hx unwitness fall in nursing home last admission 12/23/22. Cont right hip pain  - Plan: Pelvix Xray. Conservative treatment with pain RICE and PRN pain med. # Productive Cough: new productive cough, (-) fevers, (+) chills. - Plan: Currently on Oscar Diaz. Order CXR. Will Order bed side swallow. NPO for now. Check Flu A/B/COVID. # AMS: baseline dementia. Discuss with nursing home that patient has been appear at her baseline since she was discharged 12/30/22 . Awake and oriented to person. Not meet Sepsis criteria but also recent discharged for symptomatic UTI and cellulitis  - Plan: Blood culture x 2, Urine reflex to culture, current on abx    # Normocytic normal chronic anemia: Hg 11.6 on admission which around her baseline 10-11s. No sign of acute bleeding.   - Plan: Monitor sign of bleeding. CBC daily. Recommend anemia work up as OP    # Hx Dementia: appear at her baseline per nursing home. On Donepezil. Resume home med    # Overactive bladder: on Myrbertriq. Resume home med      Date of Service: Pt seen/examined on 01/05/23  and Admitted to Inpatient with expected LOS greater than two midnights due to medical therapy. Chief Complaint:  Left ankle pain and right hip pain    HPI:  Elena Post is a 80 y.o., , female who presented to the emergency room for left ankle pain and right hip pain.   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 Per chart review patient was recently discharged on December 27 for sepsis secondary to UTI and cellulitis on left lower extremity. During last hospital stay patient reported unwitnessed fall at nursing home. Upon visit patient reports left lower extremity pain worsening on her ankle. Associated symptoms include pain with ambulation. In addition she also have right hip pain which has been going on since last month. Denies any fever, nausea, vomiting, pain in any other region of her body. No recent fall history. Patient is awake and alert to her person but not time and place. ED work-up: BP elevated. CBC show no leukocytosis but normocytic normochromic anemia. BMP wnl. (-) DVT bilateral Les. Vancomycin was started. PAST MEDICAL HISTORY:    Past Medical History:   Diagnosis Date    Arthritis     Cancer ([facility] [address]     skin    Colitis     Colon polyps     Fatty liver     resolved per patient    Hearing loss of both ears     Hyperlipidemia     Varicose vein of leg     Varicosity      PAST SURGICAL HISTORY:    Past Surgical History:   Procedure Laterality Date    BLADDER SUSPENSION  1968    FOOT SURGERY Right 1970s    HYSTERECTOMY (CERVIX STATUS UNKNOWN)  1968    JOINT REPLACEMENT Left     left knee    JOINT REPLACEMENT Right     hip    MOHS SURGERY  1/22/14    NOSE    [place] OFFICE/OUTPT VISIT,PROCEDURE ONLY N/A 9/19/2018    MOHS DEFECT REPAIR BCC DORSUM OF NOSE performed by Yoan Carbone MD at [address] [place] [address] Road  2008    small blockage-adhesion     FAMILY HISTORY:    Family History   Problem Relation Age of Onset    Arthritis Mother     Arthritis Father     Cancer Father         lung    Stroke Father     Diabetes Sister      SOCIAL HISTORY:    Social History     Socioeconomic History    Marital status:       Spouse name: Not on file    Number of children: Not on file    Years of education: Not on file    Highest education level: Not on file   Occupational History    Not on file   Tobacco Use    Smoking status: Never    Smokeless tobacco: Never   Substance and Sexual Activity    Alcohol use: Yes     Comment: rare    Drug use: No    Sexual activity: Not Currently   Other Topics Concern    Not on file   Social History Narrative    Not on file     Social Determinants of Health     Financial Resource Strain: Not on file   Food Insecurity: Not on file   Transportation Needs: Not on file   Physical Activity: Inactive    Days of Exercise per Week: 0 days    Minutes of Exercise per Session: 0 min   Stress: Not on file   Social Connections: Not on file   Intimate Partner Violence: Not on file   Housing Stability: Not on file     MEDICATIONS:   Prior to Admission medications    Medication Sig Start Date End Date Taking? Authorizing Provider   clindamycin (CLEOCIN) 300 MG capsule Take 1 capsule by mouth 3 times daily for 10 days 1/3/23 1/13/23  Bartolo Joseph DO   traMADol (ULTRAM) 50 MG tablet Take 1 tablet by mouth every 6 hours as needed for Pain for up to 5 days. Max Daily Amount: 200 mg 1/3/23 1/8/23  DO Loulou Leesc. Devices (COMMODE BEDSIDE) MISC Use as directed 6/10/22   DO Loulou Leesc. Devices [facility] MISC Use as directed 6/7/22   Bartolo Joseph DO   Handicap Placard MISC by Does not apply route Expires 4/28/27 4/28/22   Bartolo Joseph DO   triamcinolone (KENALOG) 0.025 % cream apply to affected area OF LOWER LIP once daily 3/9/22   Historical Provider, MD   Elastic Bandages & Supports (T.E.D. ANTI-EMBOLISM STOCKINGS) MISC Knee high SAMMY hose. Please measure pt for appropriate size. Wear during daytime, take off at night. 8/24/21   Darvin Garcia APRN - CNP   Handicap Placard MISC by Does not apply route Expires 10/30/25 10/30/20   DO Radha Lese.  Devices Benay Do) MISC Wheeled walker with seat, use as directed 7/28/20   Bartolo Joseph DO acetaminophen (TYLENOL) 325 MG tablet Take 650 mg by mouth every 6 hours as needed for Pain    Historical Provider, MD   calcium carbonate-vitamin D (CALCIUM 600 + D) 600-400 MG-UNIT TABS per tab Take 1 tablet by mouth 2 times daily 7/27/20   Obdulia Khan DO   Multiple Vitamins-Minerals (THERAPEUTIC MULTIVITAMIN-MINERALS) tablet Take 1 tablet by mouth daily 7/27/20 7/27/21  Obdulia Khan DO   traZODone (DESYREL) 50 MG tablet Take 0.5 tablets by mouth nightly as needed for Sleep 6/24/20   Obdulia Khan DO   mirabegron (MYRBETRIQ) 50 MG TB24 Take 50 mg by mouth daily 4/28/20   Jacob Easley MD   donepezil (ARICEPT) 5 MG tablet take 1 tablet by mouth ONCE NIGHTLY. 2/27/20   Obdulia Khan DO   Psyllium (METAMUCIL) 28.3 % POWD 1 tablespoon in 8 oz water daily by mouth 12/9/19   Waynard Pump, DO       ALLERGIES: Sulfa antibiotics    REVIEW OF SYSTEM:   Review of Systems   Constitutional:  Positive for activity change and chills. Negative for appetite change, diaphoresis, fatigue and fever. HENT:  Negative for congestion, hearing loss, sinus pressure and sore throat. Eyes:  Negative for discharge and visual disturbance. Respiratory:  Positive for cough. Negative for apnea, choking, chest tightness, shortness of breath, wheezing and stridor. Cardiovascular:  Positive for leg swelling. Negative for chest pain and palpitations. Gastrointestinal:  Negative for abdominal distention, abdominal pain, blood in stool, constipation, diarrhea and nausea. Endocrine: Positive for cold intolerance. Negative for heat intolerance, polydipsia and polyphagia. Genitourinary:  Positive for pelvic pain. Negative for difficulty urinating, hematuria, vaginal bleeding and vaginal pain. Musculoskeletal:  Positive for back pain and joint swelling. Negative for arthralgias, gait problem, neck pain and neck stiffness. Skin:  Positive for color change. Negative for pallor, rash and wound.    Neurological:  Negative for dizziness, tremors, syncope, light-headedness and headaches. Psychiatric/Behavioral:  Negative for agitation, behavioral problems, confusion and decreased concentration. OBJECTIVE  PHYSICAL EXAM:   BP (!) 161/69   Pulse 79   Temp 97.7 °F (36.5 °C) (Oral)   Resp 23   Wt 157 lb (71.2 kg)   SpO2 96%   BMI 30.66 kg/m²   Vitals reviewed. Physical Exam  Vitals and nursing note reviewed. Constitutional:       General: She is not in acute distress. Appearance: She is obese. She is ill-appearing. She is not toxic-appearing or diaphoretic. HENT:      Head: Normocephalic and atraumatic. Nose: Nose normal.      Mouth/Throat:      Mouth: Mucous membranes are moist.      Pharynx: Oropharynx is clear. No oropharyngeal exudate. Eyes:      Pupils: Pupils are equal, round, and reactive to light. Cardiovascular:      Rate and Rhythm: Normal rate. Pulses: Normal pulses. Heart sounds: Normal heart sounds. No murmur heard. No friction rub. No gallop. Pulmonary:      Effort: Pulmonary effort is normal. No respiratory distress. Breath sounds: No stridor. No wheezing, rhonchi or rales. Chest:      Chest wall: No tenderness. Abdominal:      General: Bowel sounds are normal. There is distension. Palpations: Abdomen is soft. There is no mass. Tenderness: There is no abdominal tenderness. There is no guarding or rebound. Hernia: No hernia is present. Musculoskeletal:         General: Swelling and tenderness present. No deformity or signs of injury. Normal range of motion. Cervical back: Normal range of motion and neck supple. No rigidity or tenderness. Left lower leg: Edema present. Comments: Right hip tenderness. ROM wnl  Left lower extremity erythema, swelling, tender and warm to the tough. ROM wnl   Skin:     General: Skin is warm. Capillary Refill: Capillary refill takes less than 2 seconds. Findings: Erythema present.       Comments: Left ankle spreading up to left anterior shin   Neurological:      General: No focal deficit present. Mental Status: She is alert and oriented to person, place, and time. Mental status is at baseline. Psychiatric:         Mood and Affect: Mood normal.         Behavior: Behavior normal.         Thought Content: Thought content normal.         Judgment: Judgment normal.         DATA:     Diagnostic tests reviewed for today's visit:    Most recent labs and imaging results reviewed.      LABS:    Recent Results (from the past 24 hour(s))   CBC with Auto Differential    Collection Time: 01/05/23  5:19 PM   Result Value Ref Range    WBC 7.4 4.8 - 10.8 thou/mm3    RBC 3.68 (L) 4.20 - 5.40 mill/mm3    Hemoglobin 11.6 (L) 12.0 - 16.0 gm/dl    Hematocrit 35.7 (L) 37.0 - 47.0 %    MCV 97.0 81.0 - 99.0 fL    MCH 31.5 26.0 - 33.0 pg    MCHC 32.5 32.2 - 35.5 gm/dl    RDW-CV 15.9 (H) 11.5 - 14.5 %    RDW-SD 57.7 (H) 35.0 - 45.0 fL    Platelets 510 174 - 559 thou/mm3    MPV 9.5 9.4 - 12.4 fL    Seg Neutrophils 55.7 %    Lymphocytes 27.2 %    Monocytes 13.3 %    Eosinophils 2.6 %    Basophils 0.5 %    Immature Granulocytes 0.7 %    Segs Absolute 4.1 1.8 - 7.7 thou/mm3    Lymphocytes Absolute 2.0 1.0 - 4.8 thou/mm3    Monocytes Absolute 1.0 0.4 - 1.3 thou/mm3    Eosinophils Absolute 0.2 0.0 - 0.4 thou/mm3    Basophils Absolute 0.0 0.0 - 0.1 thou/mm3    Immature Grans (Abs) 0.05 0.00 - 0.07 thou/mm3    nRBC 0 /100 wbc   Lactic Acid    Collection Time: 01/05/23  5:32 PM   Result Value Ref Range    Lactic Acid 0.8 0.5 - 2.0 mmol/L   SPECIMEN REJECTION    Collection Time: 01/05/23  5:54 PM   Result Value Ref Range    Rejected Test bmp,mg     Reason for Rejection see below    Basic Metabolic Panel    Collection Time: 01/05/23  6:20 PM   Result Value Ref Range    Sodium 137 135 - 145 meq/L    Potassium 4.3 3.5 - 5.2 meq/L    Chloride 101 98 - 111 meq/L    CO2 23 23 - 33 meq/L    Glucose 98 70 - 108 mg/dL    BUN 8 7 - 22 mg/dL Creatinine 0.6 0.4 - 1.2 mg/dL    Calcium 8.9 8.5 - 10.5 mg/dL   Magnesium    Collection Time: 01/05/23  6:20 PM   Result Value Ref Range    Magnesium 2.2 1.6 - 2.4 mg/dL   Anion Gap    Collection Time: 01/05/23  6:20 PM   Result Value Ref Range    Anion Gap 13.0 8.0 - 16.0 meq/L   Glomerular Filtration Rate, Estimated    Collection Time: 01/05/23  6:20 PM   Result Value Ref Range    Est, Glom Filt Rate >60 >60 ml/min/1.73m2   Osmolality    Collection Time: 01/05/23  6:20 PM   Result Value Ref Range    Osmolality Calc 272.1 (L) 275.0 - 300.0 mOsmol/kg       IMAGING:  XR PELVIS (1-2 VIEWS)    Result Date: 1/5/2023  PROCEDURE: XR PELVIS (1-2 VIEWS) CLINICAL INFORMATION: right hip pain . TECHNIQUE: 2 projections of the pelvis were attempted. COMPARISON: 1227.2 FINDINGS: Examination is extremely limited due to patient condition. In addition a severe osteoporosis the patient was unable to cooperate for the exam. The left hip is well identified and shows severe degenerative change with no evidence of fracture. Pubic rami are intact. Prior right total hip arthroplasty. The sacrum and iliac wings cannot be adequately assessed. Limited evaluation findings discussed above. **This report has been created using voice recognition software. It may contain minor errors which are inherent in voice recognition technology. ** Final report electronically signed by Dr. Melchor Swan on 1/5/2023 8:16 PM    VL DUP LOWER EXTREMITY VENOUS BILATERAL    Result Date: 1/5/2023  PROCEDURE: VL DUP LOWER EXTREMITY VENOUS BILATERAL CLINICAL INFORMATION: BL leg pain. COMPARISON: Left lower venous ultrasound 12/27/2022 TECHNIQUE: Venous doppler ultrasound was performed of the bilateral lower extremities using gray scale, color flow and spectral doppler imaging. FINDINGS: There is normal color flow, spectral analysis and compressibility of the common femoral vein,  femoral vein and popliteal vein bilaterally .  There is normal color flow and compressibility in the posterior tibial veins, anterior tibial veins and peroneal veins. No incidental abnormality     No evidence of a DVT. **This report has been created using voice recognition software. It may contain minor errors which are inherent in voice recognition technology. ** Final report electronically signed by Dr. Blake Stallings on 1/5/2023 7:34 PM      VTE Prophylaxis: low molecular weight heparin -  start      Plan of care discussed with: patient    SIGNATURE: Rianna Hamilton DO  DATE: January 5, 2023  TIME: 9:25 PM   Ethel Lentz MD  - supervising physician

## 2023-01-06 NOTE — CARE COORDINATION
1/6/23, 3:12 PM EST    DISCHARGE PLANNING EVALUATION    Call to [facility], spoke with Jessica Akers, updated on possible discharge tomorrow. She reports is no drastic changes, pt can return to them, family would have to transport. She did ask for nurse to nurse reports and orders to be faxed. SW placed this information on pt's chart.

## 2023-01-06 NOTE — FLOWSHEET NOTE
Pt admitted to  [id number] per Dr Wilma Hutchison from ED via stretcher. Complains of cellulitis of LLE. INT in left FA. IV site free of s/s of infection or infiltration. Instructed in use of call light, tv controls, bed controls and 5 minute rule scripted to pt with understanding verbalized. Fall and safety brochure discussed with pt. Pt alert and oriented x4.

## 2023-01-06 NOTE — ED NOTES
ED to inpatient nurses report    Chief Complaint   Patient presents with    Leg Pain     bilateral      Present to ED from nursing home  LOC: alert and orientated to name and place  Vital signs   Vitals:    01/05/23 1640 01/05/23 1645 01/05/23 1753 01/05/23 1940   BP: (!) 156/68  (!) 153/68 (!) 160/75   Pulse: 76  78 82   Resp: 24  20 21   Temp: 98.2 °F (36.8 °C)      TempSrc: Oral      SpO2: 96%  96% 97%   Weight:  157 lb (71.2 kg)        Oxygen Baseline 0    Current needs required 0   LDAs:   Peripheral IV 01/05/23 Left Forearm (Active)   Site Assessment Clean, dry & intact 01/05/23 1812   Phlebitis Assessment No symptoms 01/05/23 1812   Infiltration Assessment 0 01/05/23 1812   Dressing Intervention New 01/05/23 1812     Mobility: Requires assistance * 1  Pending ED orders: none  Present condition: stable      C-SSRS Risk of Suicide: No Risk  Swallow Screening    Preferred Language: [place]     Electronically signed by Christa Cage RN on 1/5/2023 at 8:35 PM       Christa Cage, [address] [place] [address]  01/05/23 2035

## 2023-01-06 NOTE — ED NOTES
Patient resting in bed. Respirations easy and unlabored. No distress noted. Call light within reach.        Kassandra Fischer RN  01/05/23 6636

## 2023-01-06 NOTE — PLAN OF CARE
Problem: Safety - Adult  Goal: Free from fall injury  Outcome: Progressing  Flowsheets (Taken 1/6/2023 0229)  Free From Fall Injury:   Instruct family/caregiver on patient safety   Based on caregiver fall risk screen, instruct family/caregiver to ask for assistance with transferring infant if caregiver noted to have fall risk factors     Problem: Safety - Adult  Goal: Free from fall injury  Outcome: Progressing  Flowsheets (Taken 1/6/2023 0229)  Free From Fall Injury:   Instruct family/caregiver on patient safety   Based on caregiver fall risk screen, instruct family/caregiver to ask for assistance with transferring infant if caregiver noted to have fall risk factors     Problem: ABCDS Injury Assessment  Goal: Absence of physical injury  Outcome: Progressing  Flowsheets (Taken 1/5/2023 2226)  Absence of Physical Injury: Implement safety measures based on patient assessment     Problem: ABCDS Injury Assessment  Goal: Absence of physical injury  Outcome: Progressing  Flowsheets (Taken 1/5/2023 2226)  Absence of Physical Injury: Implement safety measures based on patient assessment     Problem: Skin/Tissue Integrity  Goal: Absence of new skin breakdown  Description: 1. Monitor for areas of redness and/or skin breakdown  2. Assess vascular access sites hourly  3. Every 4-6 hours minimum:  Change oxygen saturation probe site  4. Every 4-6 hours:  If on nasal continuous positive airway pressure, respiratory therapy assess nares and determine need for appliance change or resting period. Outcome: Progressing  Note: Scattered bruising and abrasions noted this shift.      Problem: Discharge Planning  Goal: Discharge to home or other facility with appropriate resources  Outcome: Progressing  Flowsheets  Taken 1/5/2023 2231  Discharge to home or other facility with appropriate resources:   Identify barriers to discharge with patient and caregiver   Arrange for needed discharge resources and transportation as appropriate Identify discharge learning needs (meds, wound care, etc)  Taken 1/5/2023 2142  Discharge to home or other facility with appropriate resources:   Identify barriers to discharge with patient and caregiver   Arrange for needed discharge resources and transportation as appropriate   Identify discharge learning needs (meds, wound care, etc)     Problem: Pain  Goal: Verbalizes/displays adequate comfort level or baseline comfort level  Outcome: Progressing  Flowsheets (Taken 1/5/2023 2142)  Verbalizes/displays adequate comfort level or baseline comfort level:   Encourage patient to monitor pain and request assistance   Assess pain using appropriate pain scale   Administer analgesics based on type and severity of pain and evaluate response   Implement non-pharmacological measures as appropriate and evaluate response     Problem: Chronic Conditions and Co-morbidities  Goal: Patient's chronic conditions and co-morbidity symptoms are monitored and maintained or improved  Outcome: Progressing  Flowsheets (Taken 1/5/2023 2142)  Care Plan - Patient's Chronic Conditions and Co-Morbidity Symptoms are Monitored and Maintained or Improved:   Monitor and assess patient's chronic conditions and comorbid symptoms for stability, deterioration, or improvement   Collaborate with multidisciplinary team to address chronic and comorbid conditions and prevent exacerbation or deterioration   Update acute care plan with appropriate goals if chronic or comorbid symptoms are exacerbated and prevent overall improvement and discharge   Care plan reviewed with patient. Patient verbalizes understanding of the plan of care and contributes to goal setting.

## 2023-01-06 NOTE — ED NOTES
Pt transferred to [place] [address] room 007 nurse informed prior to taking pt up in a stable condition.       Yaakov Howard  01/05/23 7040

## 2023-01-07 VITALS
RESPIRATION RATE: 18 BRPM | WEIGHT: 157 LBS | DIASTOLIC BLOOD PRESSURE: 69 MMHG | HEART RATE: 98 BPM | BODY MASS INDEX: 30.66 KG/M2 | SYSTOLIC BLOOD PRESSURE: 141 MMHG | OXYGEN SATURATION: 93 % | TEMPERATURE: 98.3 F

## 2023-01-07 LAB
ANION GAP SERPL CALCULATED.3IONS-SCNC: 6 MEQ/L (ref 8–16)
BASOPHILS # BLD: 0.6 %
BASOPHILS ABSOLUTE: 0 THOU/MM3 (ref 0–0.1)
BLOOD CULTURE, ROUTINE: NORMAL
BLOOD CULTURE, ROUTINE: NORMAL
BUN BLDV-MCNC: 9 MG/DL (ref 7–22)
CALCIUM SERPL-MCNC: 7.8 MG/DL (ref 8.5–10.5)
CHLORIDE BLD-SCNC: 105 MEQ/L (ref 98–111)
CO2: 26 MEQ/L (ref 23–33)
CREAT SERPL-MCNC: 0.6 MG/DL (ref 0.4–1.2)
EOSINOPHIL # BLD: 4.3 %
EOSINOPHILS ABSOLUTE: 0.2 THOU/MM3 (ref 0–0.4)
ERYTHROCYTE [DISTWIDTH] IN BLOOD BY AUTOMATED COUNT: 15.9 % (ref 11.5–14.5)
ERYTHROCYTE [DISTWIDTH] IN BLOOD BY AUTOMATED COUNT: 56.6 FL (ref 35–45)
GFR SERPL CREATININE-BSD FRML MDRD: > 60 ML/MIN/1.73M2
GLUCOSE BLD-MCNC: 93 MG/DL (ref 70–108)
HCT VFR BLD CALC: 31.9 % (ref 37–47)
HEMOGLOBIN: 10.1 GM/DL (ref 12–16)
IMMATURE GRANS (ABS): 0.03 THOU/MM3 (ref 0–0.07)
IMMATURE GRANULOCYTES: 0.6 %
LYMPHOCYTES # BLD: 32.5 %
LYMPHOCYTES ABSOLUTE: 1.5 THOU/MM3 (ref 1–4.8)
MCH RBC QN AUTO: 30.5 PG (ref 26–33)
MCHC RBC AUTO-ENTMCNC: 31.7 GM/DL (ref 32.2–35.5)
MCV RBC AUTO: 96.4 FL (ref 81–99)
MONOCYTES # BLD: 16.3 %
MONOCYTES ABSOLUTE: 0.8 THOU/MM3 (ref 0.4–1.3)
NUCLEATED RED BLOOD CELLS: 0 /100 WBC
PLATELET # BLD: 330 THOU/MM3 (ref 130–400)
PMV BLD AUTO: 9.1 FL (ref 9.4–12.4)
POTASSIUM REFLEX MAGNESIUM: 4.3 MEQ/L (ref 3.5–5.2)
RBC # BLD: 3.31 MILL/MM3 (ref 4.2–5.4)
SEG NEUTROPHILS: 45.7 %
SEGMENTED NEUTROPHILS ABSOLUTE COUNT: 2.1 THOU/MM3 (ref 1.8–7.7)
SODIUM BLD-SCNC: 137 MEQ/L (ref 135–145)
WBC # BLD: 4.7 THOU/MM3 (ref 4.8–10.8)

## 2023-01-07 PROCEDURE — 85025 COMPLETE CBC W/AUTO DIFF WBC: CPT

## 2023-01-07 PROCEDURE — 99239 HOSP IP/OBS DSCHRG MGMT >30: CPT | Performed by: PHYSICIAN ASSISTANT

## 2023-01-07 PROCEDURE — 2580000003 HC RX 258: Performed by: STUDENT IN AN ORGANIZED HEALTH CARE EDUCATION/TRAINING PROGRAM

## 2023-01-07 PROCEDURE — 94669 MECHANICAL CHEST WALL OSCILL: CPT

## 2023-01-07 PROCEDURE — 36415 COLL VENOUS BLD VENIPUNCTURE: CPT

## 2023-01-07 PROCEDURE — 80048 BASIC METABOLIC PNL TOTAL CA: CPT

## 2023-01-07 PROCEDURE — 6360000002 HC RX W HCPCS: Performed by: STUDENT IN AN ORGANIZED HEALTH CARE EDUCATION/TRAINING PROGRAM

## 2023-01-07 PROCEDURE — 6370000000 HC RX 637 (ALT 250 FOR IP): Performed by: STUDENT IN AN ORGANIZED HEALTH CARE EDUCATION/TRAINING PROGRAM

## 2023-01-07 RX ORDER — CEFDINIR 300 MG/1
300 CAPSULE ORAL 2 TIMES DAILY
Qty: 14 CAPSULE | Refills: 0 | Status: SHIPPED | OUTPATIENT
Start: 2023-01-07 | End: 2023-01-12

## 2023-01-07 RX ADMIN — PSYLLIUM HUSK 1 PACKET: 3.4 GRANULE ORAL at 08:06

## 2023-01-07 RX ADMIN — ENOXAPARIN SODIUM 40 MG: 100 INJECTION SUBCUTANEOUS at 08:04

## 2023-01-07 RX ADMIN — TRAMADOL HYDROCHLORIDE 50 MG: 50 TABLET, COATED ORAL at 04:41

## 2023-01-07 RX ADMIN — ACETAMINOPHEN 650 MG: 325 TABLET ORAL at 08:04

## 2023-01-07 RX ADMIN — CEFAZOLIN 2000 MG: 10 INJECTION, POWDER, FOR SOLUTION INTRAVENOUS at 06:07

## 2023-01-07 RX ADMIN — TROSPIUM CHLORIDE 20 MG: 20 TABLET, FILM COATED ORAL at 06:05

## 2023-01-07 RX ADMIN — SODIUM CHLORIDE, PRESERVATIVE FREE 10 ML: 5 INJECTION INTRAVENOUS at 08:01

## 2023-01-07 ASSESSMENT — PAIN SCALES - GENERAL
PAINLEVEL_OUTOF10: 3
PAINLEVEL_OUTOF10: 7
PAINLEVEL_OUTOF10: 5

## 2023-01-07 ASSESSMENT — PAIN DESCRIPTION - LOCATION: LOCATION: LEG

## 2023-01-07 ASSESSMENT — PAIN DESCRIPTION - ORIENTATION: ORIENTATION: LEFT

## 2023-01-07 NOTE — DISCHARGE INSTR - COC
Continuity of Care Form    Patient Name: Tj Schmitt   :  1930  MRN:  [id number]    Admit date:  2023  Discharge date:  2023    Code Status Order: Full Code   Advance Directives:     Admitting Physician:  Je Almaguer MD  PCP: Taiwo Bell DO    [facility] [place] [facility]/Room#: 5K-07/007-A  Discharging Unit Phone Number: [phone]    Emergency Contact:   Extended Emergency Contact Information  Primary Emergency Contact: HaliePrinceSalina  Address: [facility]            [address] Phone: [phone]  Mobile Phone: [phone]  Relation: Child  Hearing or visual needs: None  Other needs: None  Preferred language: [place]   needed? No  Secondary Emergency Contact: Coco Barron  Address: [facility]  [address]           [address] Phone: [phone]  Work Phone: [phone]  Mobile Phone: [phone]  Relation: Child   needed?  No    Past Surgical History:  Past Surgical History:   Procedure Laterality Date    BLADDER SUSPENSION  1968    FOOT SURGERY Right 1970s    HYSTERECTOMY (CERVIX STATUS UNKNOWN)  1968    JOINT REPLACEMENT Left     left knee    JOINT REPLACEMENT Right     hip    MOHS SURGERY  14    NOSE    [place] OFFICE/OUTPT VISIT,PROCEDURE ONLY N/A 2018    MOHS DEFECT REPAIR BCC DORSUM OF NOSE performed by Heather Vázquez MD at [address] [place] [address]      small blockage-adhesion       Immunization History:   Immunization History   Administered Date(s) Administered    COVID-19, PFIZER Bivalent BOOSTER, DO NOT Dilute, (age 12y+), IM, 30 mcg/0.3 mL 11/10/2022    COVID-19, PFIZER GRAY top, DO NOT Dilute, (age 15 y+), IM, 30 mcg/0.3 mL 2022    COVID-19, PFIZER PURPLE top, DILUTE for use, (age 15 y+), 30mcg/0.3mL 2021, 2021, 2021    Influenza Vaccine, unspecified formulation 10/01/2016    Influenza Virus Vaccine 10/05/2015, 2017    Influenza, High Dose (Fluzone 65 yrs and older) 10/21/2019    Pneumococcal Conjugate 13-valent (Ekimmgn44) 10/05/2015    Pneumococcal Polysaccharide (Jqxifycwt86) 2014       Active Problems:  Patient Active Problem List   Diagnosis Code    Osteoarthritis of hip M16.9    Hyperlipidemia E78.5    Fatty liver K76.0    Colitis K52.9    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 XXXA, Y92.009    Septic shock (HCC) A41.9, R65.21    Cellulitis of left lower extremity L03. 116    Right hip pain M25.551    Acute cough R05.1    Dementia (HCC) F03.90    Normocytic anemia D64.9    Overactive bladder N32.81       Isolation/Infection:   Isolation            No Isolation          Patient Infection Status       Infection Onset Added Last Indicated Last Indicated By Review Planned Expiration Resolved Resolved By    None active    Resolved    COVID-19 (Rule Out) 23 COVID-19 & Influenza Combo (Ordered)   23 Rule-Out Test Resulted            Nurse Assessment:  Last Vital Signs: BP (!) 141/69   Pulse 98   Temp 98.3 °F (36.8 °C) (Oral)   Resp 18   Wt 157 lb (71.2 kg)   SpO2 93%   BMI 30.66 kg/m²     Last documented pain score (0-10 scale): Pain Level: 3  Last Weight:   Wt Readings from Last 1 Encounters:   23 157 lb (71.2 kg)     Mental Status:  alert, oriented to name and  only    IV Access:  - None    Nursing Mobility/ADLs:  Walking   Assisted with 1 assist, gait belt and walker  Transfer  Assisted  Bathing  Assisted  Dressing  Assisted  Toileting  Assisted  Feeding  Assisted  Med Admin  Assisted  Med Delivery   whole    Wound Care Documentation and Therapy:  N/a Elimination:  Continence: Bowel: Yes  Bladder: Yes  Urinary Catheter: None   Colostomy/Ileostomy/Ileal Conduit: No       Date of Last BM: 1/6/2023    Intake/Output Summary (Last 24 hours) at 1/7/2023 1225  Last data filed at 1/7/2023 0700  Gross per 24 hour   Intake --   Output 1100 ml   Net -1100 ml     I/O last 3 completed shifts: In: 0   Out: 1600 [Urine:1600]    Safety Concerns: At Risk for Falls    Impairments/Disabilities:      Hearing    Nutrition Therapy:  Current Nutrition Therapy:   - Oral Diet:  General    Routes of Feeding: Oral  Liquids: Thin Liquids  Daily Fluid Restriction: no  Last Modified Barium Swallow with Video (Video Swallowing Test): not done    Treatments at the Time of Hospital Discharge:   Respiratory Treatments: encourage patient to use acapella daily  Oxygen Therapy:  is not on home oxygen therapy.   Ventilator:    - No ventilator support    Rehab Therapies: Physical Therapy and Occupational Therapy  Weight Bearing Status/Restrictions: No weight bearing restrictions  Other Medical Equipment (for information only, NOT a DME order):  walker, gait belt  Other Treatments: monitor left lower leg cellulitis and notify physician of any worsening/change    Patient's personal belongings (please select all that are sent with patient):  Dentures upper and lower    RN SIGNATURE:  Electronically signed by Link Rocha RN on 1/7/23 at 12:33 PM EST    CASE MANAGEMENT/SOCIAL WORK SECTION    Inpatient Status Date: ***    Readmission Risk Assessment Score:  Readmission Risk              Risk of Unplanned Readmission:  19           Discharging to Facility/ Agency   Name:   Address:  Phone:  Fax:    Dialysis Facility (if applicable)   Name:  Address:  Dialysis Schedule:  Phone:  Fax:    / signature: {Esignature:[id number]}    PHYSICIAN SECTION    Prognosis: {Prognosis:0947602449}    Condition at Discharge: [facility] Patient Condition:265492786}    Rehab Potential (if transferring to Rehab): {Prognosis:5168311680}    Recommended Labs or Other Treatments After Discharge: ***    Physician Certification: I certify the above information and transfer of Barrett Lombard  is necessary for the continuing treatment of the diagnosis listed and that she requires {Admit to Appropriate Level of Care:46292} for {GREATER/LESS:012980087} 30 days.      Update Admission H&P: {CHP DME Changes in WDISF:735202693}    PHYSICIAN SIGNATURE:  {Esignature:150302818}

## 2023-01-07 NOTE — PLAN OF CARE
Problem: Safety - Adult  Goal: Free from fall injury  1/6/2023 2338 by Sophia Padgett RN  Outcome: Progressing  1/6/2023 1242 by Betzaida Barrios RN  Outcome: Progressing     Problem: ABCDS Injury Assessment  Goal: Absence of physical injury  1/6/2023 2338 by Sophia Padgett RN  Outcome: Progressing  1/6/2023 1242 by Betzaida Barrios RN  Outcome: Progressing     Problem: Skin/Tissue Integrity  Goal: Absence of new skin breakdown  Description: 1. Monitor for areas of redness and/or skin breakdown  2. Assess vascular access sites hourly  3. Every 4-6 hours minimum:  Change oxygen saturation probe site  4. Every 4-6 hours:  If on nasal continuous positive airway pressure, respiratory therapy assess nares and determine need for appliance change or resting period.   1/6/2023 2338 by Sophia Padgett RN  Outcome: Progressing  1/6/2023 1242 by Betzaida Barrios RN  Outcome: Progressing     Problem: Discharge Planning  Goal: Discharge to home or other facility with appropriate resources  1/6/2023 2338 by Sophia Padgett RN  Outcome: Progressing  1/6/2023 1242 by Betzaida Barrois RN  Outcome: Progressing  Flowsheets (Taken 1/6/2023 1007)  Discharge to home or other facility with appropriate resources:   Identify barriers to discharge with patient and caregiver   Arrange for needed discharge resources and transportation as appropriate   Identify discharge learning needs (meds, wound care, etc)     Problem: Pain  Goal: Verbalizes/displays adequate comfort level or baseline comfort level  1/6/2023 2338 by Sophia Padgett RN  Outcome: Progressing  1/6/2023 1242 by Betzaida Barrios RN  Outcome: Progressing  Flowsheets (Taken 1/6/2023 1000)  Verbalizes/displays adequate comfort level or baseline comfort level:   Encourage patient to monitor pain and request assistance   Assess pain using appropriate pain scale   Administer analgesics based on type and severity of pain and evaluate response   Implement non-pharmacological measures as appropriate and evaluate response   Consider cultural and social influences on pain and pain management     Problem: Chronic Conditions and Co-morbidities  Goal: Patient's chronic conditions and co-morbidity symptoms are monitored and maintained or improved  1/6/2023 2338 by Zamzam Alcantara RN  Outcome: Progressing  1/6/2023 1242 by Carolyn Maxwell RN  Outcome: Progressing  Flowsheets (Taken 1/6/2023 1007)  Care Plan - Patient's Chronic Conditions and Co-Morbidity Symptoms are Monitored and Maintained or Improved:   Monitor and assess patient's chronic conditions and comorbid symptoms for stability, deterioration, or improvement   Collaborate with multidisciplinary team to address chronic and comorbid conditions and prevent exacerbation or deterioration   Update acute care plan with appropriate goals if chronic or comorbid symptoms are exacerbated and prevent overall improvement and discharge    Care plan reviewed with patient. Patient verbalizes understanding of the plan of care and contribute to goal setting.

## 2023-01-07 NOTE — PROGRESS NOTES
Spoke with patient's son Greg Man to notify of discharge today. He will be brining clothes up and transporting patient this afternoon.

## 2023-01-07 NOTE — PLAN OF CARE
Problem: Safety - Adult  Goal: Free from fall injury  1/7/2023 1100 by Tommy Kahn RN  Outcome: Progressing  Flowsheets (Taken 1/7/2023 1100)  Free From Fall Injury: Instruct family/caregiver on patient safety     Problem: ABCDS Injury Assessment  Goal: Absence of physical injury  1/7/2023 1100 by Tommy Kahn RN  Outcome: Progressing  Flowsheets (Taken 1/7/2023 1100)  Absence of Physical Injury: Implement safety measures based on patient assessment     Problem: Discharge Planning  Goal: Discharge to home or other facility with appropriate resources  1/7/2023 1100 by Tommy Kahn RN  Outcome: Progressing  Flowsheets (Taken 1/7/2023 1100)  Discharge to home or other facility with appropriate resources:   Identify barriers to discharge with patient and caregiver   Arrange for needed discharge resources and transportation as appropriate   Identify discharge learning needs (meds, wound care, etc)     Problem: Pain  Goal: Verbalizes/displays adequate comfort level or baseline comfort level  1/7/2023 1100 by Tommy Kahn RN  Outcome: Progressing  Flowsheets (Taken 1/7/2023 1100)  Verbalizes/displays adequate comfort level or baseline comfort level:   Encourage patient to monitor pain and request assistance   Assess pain using appropriate pain scale   Administer analgesics based on type and severity of pain and evaluate response   Implement non-pharmacological measures as appropriate and evaluate response    Tylenol prn pain. Ultram prn pain.  Treating left lower leg cellulitis with IV Ancef     Problem: Chronic Conditions and Co-morbidities  Goal: Patient's chronic conditions and co-morbidity symptoms are monitored and maintained or improved  1/7/2023 1100 by Tommy Kahn RN  Outcome: Progressing  Flowsheets (Taken 1/7/2023 1100)  Care Plan - Patient's Chronic Conditions and Co-Morbidity Symptoms are Monitored and Maintained or Improved: Monitor and assess patient's chronic conditions and comorbid symptoms for stability, deterioration, or improvement

## 2023-01-07 NOTE — PLAN OF CARE
Problem: Respiratory - Adult  Goal: Clear lung sounds  Outcome: Progressing  Note: Acapella to clear secretions. Patient mutually agreed on goals.

## 2023-01-07 NOTE — PROGRESS NOTES
Discharge instructions reviewed with patient's son and he is transporting patient to [facility] via private vehicle.

## 2023-01-09 ENCOUNTER — CARE COORDINATION (OUTPATIENT)
Dept: CASE MANAGEMENT | Age: 88
End: 2023-01-09

## 2023-01-09 DIAGNOSIS — L03.116 CELLULITIS OF LEFT LOWER EXTREMITY: Primary | ICD-10-CM

## 2023-01-09 LAB — AEROBIC CULTURE: NORMAL

## 2023-01-09 PROCEDURE — 1111F DSCHRG MED/CURRENT MED MERGE: CPT | Performed by: FAMILY MEDICINE

## 2023-01-09 NOTE — CARE COORDINATION
1/9/23, 7:05 AM EST    Patient goals/plan/ treatment preferences discussed by  and . Patient goals/plan/ treatment preferences reviewed with patient/ family. Patient/ family verbalize understanding of discharge plan and are in agreement with goal/plan/treatment preferences. Understanding was demonstrated using the teach back method. AVS provided by RN at time of discharge, which includes all necessary medical information pertaining to the patients current course of illness, treatment, post-discharge goals of care, and treatment preferences. Services At/After Discharge: Assisted Living [facility]       IMM Letter  IMM Letter given to Patient/Family/Significant other/Guardian/POA/by[de-identified] Pt. Access  IMM Letter date given[de-identified] 01/05/23  IMM Letter time given[de-identified] 1958     Per chart, pt returned to [facility] 1/7 by son transport.

## 2023-01-09 NOTE — CARE COORDINATION
[facility] Care Transitions Initial Follow Up Call    Call within 2 business days of discharge: Yes    Care Transition Nurse contacted the family and caregiver by telephone to perform post hospital discharge assessment. Verified name and  with family and caregiver as identifiers. Provided introduction to self, and explanation of the Care Transition Nurse role. Patient: Juancarlos Morrow Patient : 1930   MRN: [id number]  Reason for Admission: leg pain  Discharge Date: 23 RARS: Readmission Risk Score: 16.1      Last Discharge  Street       Date Complaint Diagnosis Description Type Department Provider    23 Leg Pain Cellulitis of left lower extremity ED to Hosp-Admission (Discharged) (ADMITTED) [facility] Omaira Clay; Demetrice Rhoades MD... Was this an external facility discharge? No Discharge Facility:     Challenges to be reviewed by the provider   Additional needs identified to be addressed with provider: No  none               Method of communication with provider: none. CTN call to Nurse at [address] [facility] today and she says Ludivina Puller is doing better. She says her LLE cellulitis is improved. Meds reviewed and are correct. C/o LE pain-> Tylenol w/ some relief. Denies fever, chills, cough, chest pain, n/v/d. Eating, drinking & sleeping ok. Denies problems w/ urination/bowels. Spoke w/ daughter Iwona Goodman and her brother will be taking Ludivina Puller to PCP walk in clinic this week by [facility] [address]. No other concerns voiced at this time. CTN # givent to [employer]. [facility] Care Transitions [phone]        Care Transition Nurse reviewed discharge instructions, medical action plan, and red flags with family and caregiver who verbalized understanding. The family and caregiver was given an opportunity to ask questions and does not have any further questions or concerns at this time. Were discharge instructions available to patient? Yes.  Reviewed appropriate site of care based on symptoms and resources available to patient including: PCP  When to call 911. The family agrees to contact the PCP office for questions related to their healthcare. Advance Care Planning:   Does patient have an Advance Directive: not on file. Medication reconciliation was performed with caregiver, who verbalizes understanding of administration of home medications. Medications reviewed, 1111F entered: yes    Was patient discharged with a pulse oximeter? no    Non-face-to-face services provided:  Scheduled appointment with PCP-1/12/23  Obtained and reviewed discharge summary and/or continuity of care documents    Offered patient enrollment in the Remote Patient Monitoring (RPM) program for in-home monitoring: Patient is not eligible for RPM program.    Care Transitions 24 Hour Call    Schedule Follow Up Appointment with PCP: Completed  Do you have a copy of your discharge instructions?: Yes  Do you have all of your prescriptions and are they filled?: Yes  Have you been contacted by a [facility] Pharmacist?: No  Have you scheduled your follow up appointment?: Yes  How are you going to get to your appointment?: Car - family or friend to transport  Patient DME: Darylene Sark you have support at home?: Alone  Do you feel like you have everything you need to keep you well at home?: Yes  Are you an active caregiver in your home?: No  Care Transitions Interventions     Transportation Support: Declined            Follow Up  No future appointments. Care Transition Nurse provided contact information. No further follow-up call indicated based on severity of symptoms and risk factors.   Plan for next call:  none    Jared Bumpers, RN

## 2023-01-10 LAB
BLOOD CULTURE, ROUTINE: NORMAL
BLOOD CULTURE, ROUTINE: NORMAL

## 2023-01-10 NOTE — DISCHARGE SUMMARY
Hospitalist Discharge Summary        Patient: Matty Aguilar  YOB: 1930  MRN: [id number]   Acct: [de-identified]    Primary Care Physician: Moises Cochran DO    Admit date  1/5/2023    Discharge date: 1/7/2023  3:30 PM    Chief Complaint on presentation :-  Left ankle pain and right hip pain     Discharge Assessment and Plan:-   Cellulitis, LLE: Left leg has progressed. Very erythematous and edematous, which dramatically improved with ABX. Doppler negative. Recent discharge 12/30 on Augmentin. Pt is on Ancef now, transitioned to [address] Monitor. Afebrile. MRSA negative  Acute metabolic encephalopathy: Patient has baseline dementia. Pt was more confused than baseline per NH. Productive Cough: Flu negative COVID-negative. Chest x-ray shows mild edema. Anemia, chronic, normocytic: Patient's hemoglobin is at baseline. OAB: Myrbertriq. Initial H and P and Hospital course:-  Initial H&P \" Matty Aguilar is a 80 y.o., , female who presented to the emergency room for left ankle pain and right hip pain. 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 Per chart review patient was recently discharged on December 27 for sepsis secondary to UTI and cellulitis on left lower extremity. During last hospital stay patient reported unwitnessed fall at nursing home. Upon visit patient reports left lower extremity pain worsening on her ankle. Associated symptoms include pain with ambulation. In addition she also have right hip pain which has been going on since last month. Denies any fever, nausea, vomiting, pain in any other region of her body. No recent fall history. Patient is awake and alert to her person but not time and place. ED work-up: BP elevated. CBC show no leukocytosis but normocytic normochromic anemia. BMP wnl. (-) DVT bilateral Les. Vancomycin was started. \"     1/6: Patient was admitted to the night. Pt having no new issues. Pt states that her leg is in pain. 1/7: Pt did very well on ABX and states that pain is much more controlled. On the day of discharge, it was explained to the patient that it was very important to follow up with his PCP to have continued care. Appointments were made and information was given. Physical Exam:-  General appearance: No apparent distress, appears stated age and cooperative. HEENT: Pupils equal, round, and reactive to light. Conjunctivae/corneas clear. Neck: Supple, with full range of motion. No jugular venous distention. Trachea midline. Respiratory:  Normal respiratory effort on RA. Clear to auscultation, bilaterally without Rales/Wheezes/Rhonchi. Cardiovascular: Regular rate and rhythm with normal S1/S2 without murmurs, rubs or gallops. Abdomen: Soft, non-tender, non-distended with normal bowel sounds. Musculoskeletal: passive and active ROM x 4 extremities. Skin: LLE erythema, edematous dramatically improved   Neurologic:  Neurovascularly intact without any focal sensory/motor deficits. Cranial nerves: II-XII intact, grossly non-focal.  Psychiatric: Alert , conversationally confused   Capillary Refill: Brisk,< 3 seconds   Peripheral Pulses: +2 palpable, equal bilaterally     Vitals:   No data found.   Weight:   Weight: 157 lb (71.2 kg)   24 hour intake/output:   No intake or output data in the 24 hours ending 01/10/23 1144      Discharge Medications:-      Medication List        START taking these medications      cefdinir 300 MG capsule  Commonly known as: OMNICEF  Take 1 capsule by mouth 2 times daily for 7 days     triamcinolone 0.025 % cream  Commonly known as: KENALOG            CONTINUE taking these medications      acetaminophen 325 MG tablet  Commonly known as: TYLENOL     calcium carbonate-vitamin D 600-400 MG-UNIT Tabs per tab  Commonly known as: Calcium 600 + D  Take 1 tablet by mouth 2 times daily     donepezil 5 MG tablet  Commonly known as: ARICEPT  take 1 tablet by mouth ONCE NIGHTLY. Handicap Placard Misc  by Does not apply route Expires 10/30/25     Handicap Placard Misc  by Does not apply route Expires 4/28/27     METAMUCIL FIBER PO     mirabegron 50 MG Tb24  Commonly known as: MYRBETRIQ  Take 50 mg by mouth daily     T.E.D. Anti-Embolism Stockings Misc  Knee high SAMMY hose. Please measure pt for appropriate size. Wear during daytime, take off at night. therapeutic multivitamin-minerals tablet  Take 1 tablet by mouth daily     traZODone 50 MG tablet  Commonly known as: DESYREL  Take 0.5 tablets by mouth nightly as needed for Sleep     * Walker Misc  Wheeled walker with seat, use as directed     * Wheelchair Misc  Use as directed     * Commode Bedside Misc  Use as directed           * This list has 3 medication(s) that are the same as other medications prescribed for you. Read the directions carefully, and ask your doctor or other care provider to review them with you. STOP taking these medications      amoxicillin-clavulanate 500-125 MG per tablet  Commonly known as: AUGMENTIN     clindamycin 300 MG capsule  Commonly known as: CLEOCIN            ASK your doctor about these medications      traMADol 50 MG tablet  Commonly known as: ULTRAM  Take 1 tablet by mouth every 6 hours as needed for Pain for up to 5 days. Max Daily Amount: 200 mg  Ask about: Should I take this medication? Where to Get Your Medications        These medications were sent to [id number] - [place], [address]  [address] [place] [id number]      Phone: [phone]   cefdinir 300 MG capsule          Labs :-  No results found for this or any previous visit (from the past 72 hour(s)). Microbiology:    Blood culture #1:   Lab Results   Component Value Date/Time    BC No growth 24 hours. No growth 48 hours.  01/05/2023 05:32 PM       Blood culture #2:No results found for: BLOODCULT2    Organism:  No results found for: LABGRAM    MRSA culture only:No results found for: [facility]    Urine culture:   Lab Results   Component Value Date/Time    LABURIN  01/15/2020 11:38 AM     No growth-preliminary Growth of Contaminants. The mixture of organisms present are not a common cause of urinary tract infections and probably represent skin tran or distal urethral tran. Lab Results   Component Value Date/Time    ORG Escherichia coli 12/27/2022 02:30 AM        Respiratory culture: No results found for: CULTRESP    Aerobic and Anaerobic :  Lab Results   Component Value Date/Time    LABAERO No Acinetobacter species isolated. 01/05/2023 10:08 PM     No results found for: LABANAE    Urinalysis:      Lab Results   Component Value Date/Time    NITRU NEGATIVE 01/06/2023 04:05 AM    WBCUA 0-2 01/06/2023 04:05 AM    BACTERIA NONE SEEN 01/06/2023 04:05 AM    RBCUA NONE SEEN 01/06/2023 04:05 AM    BLOODU NEGATIVE 01/06/2023 04:05 AM    SPECGRAV 1.009 01/06/2023 04:05 AM    GLUCOSEU NEGATIVE 12/27/2022 02:30 AM       Radiology:-  XR PELVIS (1-2 VIEWS)    Result Date: 1/5/2023  PROCEDURE: XR PELVIS (1-2 VIEWS) CLINICAL INFORMATION: right hip pain . TECHNIQUE: 2 projections of the pelvis were attempted. COMPARISON: 1227.2 FINDINGS: Examination is extremely limited due to patient condition. In addition a severe osteoporosis the patient was unable to cooperate for the exam. The left hip is well identified and shows severe degenerative change with no evidence of fracture. Pubic rami are intact. Prior right total hip arthroplasty. The sacrum and iliac wings cannot be adequately assessed. Limited evaluation findings discussed above. **This report has been created using voice recognition software. It may contain minor errors which are inherent in voice recognition technology. ** Final report electronically signed by Dr. Ab Jackson on 1/5/2023 8:16 PM    XR CHEST PORTABLE    Result Date: 1/5/2023  Chest X-ray: 1 view. Indication: Shortness of breath. Cough. Comparison: CR/SR - XR CHEST PORTABLE - 12/27/2022 02:41 AM EST Findings: Mild interstitial thickening, more prominent since prior study. No moderate or large pleural effusion. No dense airspace consolidation. The cardiac silhouette is enlarged and stable in size. Aortic atherosclerotic calcifications. Bony thorax is grossly intact. Bony demineralization. Impression: Cardiomegaly, with mild interstitial edema. This document has been electronically signed by: Oumou Triplett MD on 01/05/2023 09:46 PM    VL DUP LOWER EXTREMITY VENOUS BILATERAL    Result Date: 1/5/2023  PROCEDURE: VL DUP LOWER EXTREMITY VENOUS BILATERAL CLINICAL INFORMATION: BL leg pain. COMPARISON: Left lower venous ultrasound 12/27/2022 TECHNIQUE: Venous doppler ultrasound was performed of the bilateral lower extremities using gray scale, color flow and spectral doppler imaging. FINDINGS: There is normal color flow, spectral analysis and compressibility of the common femoral vein,  femoral vein and popliteal vein bilaterally . There is normal color flow and compressibility in the posterior tibial veins, anterior tibial veins and peroneal veins. No incidental abnormality     No evidence of a DVT. **This report has been created using voice recognition software. It may contain minor errors which are inherent in voice recognition technology. ** Final report electronically signed by Dr. Merline Spoon on 1/5/2023 7:34 PM    US EXTREMITY LEFT NON VASC LIMITED    Result Date: 1/5/2023  Soft Tissue Ultrasound. Indication: Concern for abscess. Left lower extremity cellulitis. Technique: Soft tissue ultrasound utilizing grayscale. Comparison: None Findings: A targeted soft tissue ultrasound was performed in the anterior aspect of the left lower extremity in the region of clinical concern. Diffuse subcutaneous edema with hyperemia on color Doppler, most compatible with cellulitis.  No loculated fluid collection. Impression: Cellulitis in the interrogated regions of the left lower extremity. Negative for loculated fluid collection/abscess.  This document has been electronically signed by: Asha Carrasco MD on 01/05/2023 09:44 PM       Follow-up scheduled after discharge :-    in the next few days with Tanna Sicard, DO      Consultations during this hospital stay:-  [] NONE [] Cardiology  [] Nephrology  [] Hemo onco   [] GI   [] ID  [] Endocrine  [] Pulm    [] Neuro    [] Psych   [] Urology  [] ENT   [] G SURGERY   []Ortho    []CV surg    [] Palliative  [] Hospice [] Pain management   []    []TCU   [] PT/OT  OTHERS:-    Disposition: home  Condition at Discharge: Stable    Time Spent:- 40 minutes    Electronically signed by STU Alarcon on 1/10/23 at 11:44 AM EST   Discharging Hospitalist

## 2023-01-12 ENCOUNTER — OFFICE VISIT (OUTPATIENT)
Dept: FAMILY MEDICINE CLINIC | Age: 88
End: 2023-01-12

## 2023-01-12 VITALS
OXYGEN SATURATION: 97 % | HEART RATE: 81 BPM | TEMPERATURE: 97.8 F | DIASTOLIC BLOOD PRESSURE: 78 MMHG | SYSTOLIC BLOOD PRESSURE: 124 MMHG

## 2023-01-12 DIAGNOSIS — L03.116 CELLULITIS OF LEFT LOWER EXTREMITY: ICD-10-CM

## 2023-01-12 DIAGNOSIS — Z09 HOSPITAL DISCHARGE FOLLOW-UP: Primary | ICD-10-CM

## 2023-01-12 RX ORDER — DOXYCYCLINE HYCLATE 100 MG
100 TABLET ORAL 2 TIMES DAILY
Qty: 20 TABLET | Refills: 0 | Status: SHIPPED | OUTPATIENT
Start: 2023-01-12 | End: 2023-01-22

## 2023-01-12 NOTE — PROGRESS NOTES
Transition of Care Visit/Hospital Follow Up:      Barrett Lombard is a 80 y.o. female that presents for Follow-Up from Hospital        Date of Discharge:   1/7/2023  Was patient contacted within 2 business days of discharge (see chart for documentation):  yes       Patient presents for hospital follow up. Patient recently hospitalized at [facility] for treatment of cellulitis. Symptoms prior to admission:  erythematous left leg     Hospital Course per DC Summary:       Admit date  1/5/2023               Discharge date: 1/7/2023  3:30 PM     Chief Complaint on presentation :-  Left ankle pain and right hip pain      Discharge Assessment and Plan:-   Cellulitis, LLE: Left leg has progressed. Very erythematous and edematous, which dramatically improved with ABX. Doppler negative. Recent discharge 12/30 on Augmentin. Pt is on Ancef now, transitioned to [address] Monitor. Afebrile. MRSA negative  Acute metabolic encephalopathy: Patient has baseline dementia. Pt was more confused than baseline per NH. Productive Cough: Flu negative COVID-negative. Chest x-ray shows mild edema. Anemia, chronic, normocytic: Patient's hemoglobin is at baseline. OAB: Myrbertriq. Initial H and P and Hospital course:-  Initial H&P \" Barrett Lombard is a 80 y.o., , female who presented to the emergency room for left ankle pain and right hip pain. 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 Per chart review patient was recently discharged on December 27 for sepsis secondary to UTI and cellulitis on left lower extremity. During last hospital stay patient reported unwitnessed fall at nursing home. Upon visit patient reports left lower extremity pain worsening on her ankle. Associated symptoms include pain with ambulation. In addition she also have right hip pain which has been going on since last month.   Denies any fever, nausea, vomiting, pain in any other region of her body. No recent fall history. Patient is awake and alert to her person but not time and place. ED work-up: BP elevated. CBC show no leukocytosis but normocytic normochromic anemia. BMP wnl. (-) DVT bilateral Les. Vancomycin was started. \"      1/6: Patient was admitted to the night. Pt having no new issues. Pt states that her leg is in pain. 1/7: Pt did very well on ABX and states that pain is much more controlled. On the day of discharge, it was explained to the patient that it was very important to follow up with his PCP to have continued care. Appointments were made and information was given. Medication changes at discharge:  Med list reconciled today    Clinical course since discharge:  here with son. Still with erythema and edema noted on left lower extremity. +tenderness to palpation. No open areas or drainage. Erythema is slowly improving. Still warm to touch. Pedal pulse +1, left foot warm to touch. Pt denies fevers, chills, chest pain or SOB. She is eating ok. Current Outpatient Medications   Medication Sig Dispense Refill    doxycycline hyclate (VIBRA-TABS) 100 MG tablet Take 1 tablet by mouth 2 times daily for 10 days 20 tablet 0    METAMUCIL FIBER PO Take 2 capsules by mouth daily      Misc. Devices (COMMODE BEDSIDE) MISC Use as directed 1 each 0    Misc. Devices [facility]) MISC Use as directed 1 each 0    Handicap Placard MISC by Does not apply route Expires 4/28/27 1 each 0    triamcinolone (KENALOG) 0.025 % cream apply to affected area OF LOWER LIP once daily      Elastic Bandages & Supports (T.E.D. ANTI-EMBOLISM STOCKINGS) MISC Knee high SAMMY hose. Please measure pt for appropriate size. Wear during daytime, take off at night. 1 each 1    Handicap Placard MISC by Does not apply route Expires 10/30/25 1 each 0    Misc.  Devices (WALKER) MISC Wheeled walker with seat, use as directed 1 each 0    acetaminophen (TYLENOL) 325 MG tablet Take 650 mg by mouth every 6 hours as needed for Pain      calcium carbonate-vitamin D (CALCIUM 600 + D) 600-400 MG-UNIT TABS per tab Take 1 tablet by mouth 2 times daily 60 tablet 5    Multiple Vitamins-Minerals (THERAPEUTIC MULTIVITAMIN-MINERALS) tablet Take 1 tablet by mouth daily 30 tablet 11    traZODone (DESYREL) 50 MG tablet Take 0.5 tablets by mouth nightly as needed for Sleep 15 tablet 5    mirabegron (MYRBETRIQ) 50 MG TB24 Take 50 mg by mouth daily 30 tablet 3    donepezil (ARICEPT) 5 MG tablet take 1 tablet by mouth ONCE NIGHTLY. 90 tablet 3     No current facility-administered medications for this visit. Past Medical History:   Diagnosis Date    Arthritis     Cancer ([facility] [address]     skin    Colitis     Colon polyps     Fatty liver     resolved per patient    Hearing loss of both ears     Hyperlipidemia     Varicose vein of leg     Varicosity        Past Surgical History:   Procedure Laterality Date    BLADDER SUSPENSION  1968    FOOT SURGERY Right 1970s    HYSTERECTOMY (CERVIX STATUS UNKNOWN)  1968    JOINT REPLACEMENT Left     left knee    JOINT REPLACEMENT Right     hip    MOHS SURGERY  1/22/14    NOSE    SD OFFICE/OUTPT VISIT,PROCEDURE ONLY N/A 9/19/2018    MOHS DEFECT REPAIR BCC DORSUM OF NOSE performed by Lizet Rueda MD at [address] [place] [address]  2008    small blockage-adhesion       Social History     Tobacco Use    Smoking status: Never    Smokeless tobacco: Never   Substance Use Topics    Alcohol use: Yes     Comment: rare    Drug use: No       Family History   Problem Relation Age of Onset    Arthritis Mother     Arthritis Father     Cancer Father         lung    Stroke Father     Diabetes Sister          I have reviewed the patient's past medical history, past surgical history, allergies, medications, social and family history and I have made updates where appropriate.         PHYSICAL EXAM:  /78   Pulse 81   Temp 97.8 °F (36.6 °C) (Infrared)   SpO2 97%   General Appearance: well developed and well- nourished, in no acute distress  Head: normocephalic and atraumatic  ENT: external ear and ear canal normal bilaterally, nose without deformity, nasal mucosa and turbinates normal without polyps, oropharynx normal, dentition is normal for age, no lip or gum lesions noted  Neck: supple and non-tender without mass  Pulmonary/Chest: clear to auscultation bilaterally- no wheezes, rales or rhonchi, normal air movement, no respiratory distress or retractions  Cardiovascular: normal rate, regular rhythm, normal S1 and S2, no murmurs, rubs, clicks, or gallops, distal pulses intact  Abdomen: soft, non-tender, non-distended, no rebound or guarding, no masses or hernias noted, no hepatospleenomegaly  Extremities: + edema in bilateral lower extremities. Left lower leg with erythema from marking that was placed last office visit down to ankle. Medial ankle tender to touch. + 1 pitting edema. Psych:  Normal affect without evidence of depression or anxiety, insight and judgement are appropriate, memory appears intact  Skin: warm and dry, no rash or erythema      Diagnostic test results reviewed: inpatient labs and chest x-ray    Patient risk of morbidity and mortality: moderate      ASSESSMENT & PLAN  Aurea Springer was seen today for follow-up from hospital.    Diagnoses and all orders for this visit:    Hospital discharge follow-up  -     AK DISCHARGE MEDS RECONCILED W/ CURRENT OUTPATIENT MED LIST    Other orders  -     doxycycline hyclate (VIBRA-TABS) 100 MG tablet; Take 1 tablet by mouth 2 times daily for 10 days      FU beginning of next week for re-eval of left leg. Recommend elevating foot of bed at night, and wearing ace wraps during the day to help with lower extremity swelling, can remove at night. Son wasn't sure that pt could tolerate SAMMY hose.      Level of medical complexity:  moderate    -Overall, patient is improving  -Continue current meds  -Advised to continue to closely monitor her symptoms and if any worsening to seek treatment    All copied or forwarded information in the progress note was verified by me to be accurate at the time of visit  Patient's past medical, surgical, social and family history were reviewed and updated      All patient questions answered. Patient voiced understanding.

## 2023-01-12 NOTE — PATIENT INSTRUCTIONS
-Stop Cefdinir  -Start Doxycyline  -Please elevate legs as much as possible  -ACE wrap bilateral legs from toes to knees during the day, can remove at night  Notify our office for any worsening redness/swelling

## 2023-01-17 ENCOUNTER — OFFICE VISIT (OUTPATIENT)
Dept: FAMILY MEDICINE CLINIC | Age: 88
End: 2023-01-17
Payer: MEDICARE

## 2023-01-17 VITALS
RESPIRATION RATE: 16 BRPM | SYSTOLIC BLOOD PRESSURE: 130 MMHG | HEART RATE: 69 BPM | DIASTOLIC BLOOD PRESSURE: 48 MMHG | TEMPERATURE: 97.1 F | OXYGEN SATURATION: 95 %

## 2023-01-17 DIAGNOSIS — L03.116 CELLULITIS OF LEFT LOWER EXTREMITY: Primary | ICD-10-CM

## 2023-01-17 PROCEDURE — 1111F DSCHRG MED/CURRENT MED MERGE: CPT | Performed by: NURSE PRACTITIONER

## 2023-01-17 PROCEDURE — G8484 FLU IMMUNIZE NO ADMIN: HCPCS | Performed by: NURSE PRACTITIONER

## 2023-01-17 PROCEDURE — 1090F PRES/ABSN URINE INCON ASSESS: CPT | Performed by: NURSE PRACTITIONER

## 2023-01-17 PROCEDURE — G8417 CALC BMI ABV UP PARAM F/U: HCPCS | Performed by: NURSE PRACTITIONER

## 2023-01-17 PROCEDURE — G8427 DOCREV CUR MEDS BY ELIG CLIN: HCPCS | Performed by: NURSE PRACTITIONER

## 2023-01-17 PROCEDURE — 1123F ACP DISCUSS/DSCN MKR DOCD: CPT | Performed by: NURSE PRACTITIONER

## 2023-01-17 PROCEDURE — 99214 OFFICE O/P EST MOD 30 MIN: CPT | Performed by: NURSE PRACTITIONER

## 2023-01-17 PROCEDURE — 1036F TOBACCO NON-USER: CPT | Performed by: NURSE PRACTITIONER

## 2023-01-17 RX ORDER — ACETAMINOPHEN 500 MG
1000 TABLET ORAL 3 TIMES DAILY PRN
Qty: 180 TABLET | Refills: 1 | Status: SHIPPED | OUTPATIENT
Start: 2023-01-17

## 2023-01-17 RX ORDER — AMOXICILLIN AND CLAVULANATE POTASSIUM 875; 125 MG/1; MG/1
1 TABLET, FILM COATED ORAL 2 TIMES DAILY
Qty: 14 TABLET | Refills: 0 | Status: SHIPPED | OUTPATIENT
Start: 2023-01-17 | End: 2023-01-24

## 2023-01-17 ASSESSMENT — ENCOUNTER SYMPTOMS
SHORTNESS OF BREATH: 0
COLOR CHANGE: 1

## 2023-01-17 NOTE — PATIENT INSTRUCTIONS
Augmentin and Doxycyline for cellulitis    Keep ace wraps on during day and off at night. Any worsening swelling or redness, please let Dr Mitzy Lockett office know.

## 2023-01-17 NOTE — PROGRESS NOTES
Ester Rebolledo is a 80 y.o. female thatpresents for Follow-up (legs)      History obtained today from Patient and Son. HPI    Here for follow up of left leg. Still with erythema and warmth of left lower leg. No fevers. Ace wraps are on. Right leg swelling improved. Pt does report some pain of left lower leg with palpation. Was started on doxycycline last week. I have reviewed the patient's past medical history, past surgical history, allergies,medications, social and family history and I have made updates where appropriate. Past Medical History:   Diagnosis Date    Arthritis     Cancer ([facility] [address]     skin    Colitis     Colon polyps     Fatty liver     resolved per patient    Hearing loss of both ears     Hyperlipidemia     Varicose vein of leg     Varicosity        Social History     Tobacco Use    Smoking status: Never    Smokeless tobacco: Never   Substance Use Topics    Alcohol use: Yes     Comment: rare    Drug use: No       Family History   Problem Relation Age of Onset    Arthritis Mother     Arthritis Father     Cancer Father         lung    Stroke Father     Diabetes Sister          Review of Systems   Constitutional:  Negative for fever. Respiratory:  Negative for shortness of breath. Cardiovascular:  Negative for chest pain. Skin:  Positive for color change. PHYSICAL EXAM:  BP (!) 130/48   Pulse 69   Temp 97.1 °F (36.2 °C) (Infrared)   Resp 16   SpO2 95%     Physical Exam  Constitutional:       Appearance: Normal appearance. Pulmonary:      Effort: Pulmonary effort is normal.   Abdominal:      Palpations: Abdomen is soft. Musculoskeletal:      Right lower leg: Swelling present. Left lower leg: Swelling and tenderness present. Legs:       Comments: Erythema noted   Skin:     General: Skin is warm and dry. Findings: Erythema present. Neurological:      Mental Status: She is alert and oriented to person, place, and time.    Psychiatric:         Mood and Affect: Mood normal.         Behavior: Behavior normal.         Thought Content: Thought content normal.         Judgment: Judgment normal.         ASSESSMENT & PLAN  Alex Bliss was seen today for follow-up. Diagnoses and all orders for this visit:    Cellulitis of left lower extremity    Other orders  -     amoxicillin-clavulanate (AUGMENTIN) 875-125 MG per tablet; Take 1 tablet by mouth 2 times daily for 7 days  -     acetaminophen (TYLENOL) 500 MG tablet; Take 2 tablets by mouth 3 times daily as needed for Pain      No follow-ups on file. Start above treatments    FU next Monday to re-eval  Continue compression to lower extremities. And elevation. Discussed with Dr Bria Lennon per perfect serve, recommend to continue current treatment. All copied or forwarded information in the progress note was verified by me to be accurate at the time of visit  Patient's past medical, surgical, social and family history were reviewed and updated     All patient questions answered. Patient voiced understanding.

## 2023-01-23 ENCOUNTER — OFFICE VISIT (OUTPATIENT)
Dept: FAMILY MEDICINE CLINIC | Age: 88
End: 2023-01-23
Payer: MEDICARE

## 2023-01-23 ENCOUNTER — TELEPHONE (OUTPATIENT)
Dept: FAMILY MEDICINE CLINIC | Age: 88
End: 2023-01-23

## 2023-01-23 ENCOUNTER — TELEPHONE (OUTPATIENT)
Dept: WOUND CARE | Age: 88
End: 2023-01-23

## 2023-01-23 VITALS
OXYGEN SATURATION: 96 % | TEMPERATURE: 97.5 F | WEIGHT: 157 LBS | RESPIRATION RATE: 14 BRPM | HEART RATE: 95 BPM | HEIGHT: 60 IN | DIASTOLIC BLOOD PRESSURE: 56 MMHG | BODY MASS INDEX: 30.82 KG/M2 | SYSTOLIC BLOOD PRESSURE: 128 MMHG

## 2023-01-23 DIAGNOSIS — L03.116 CELLULITIS OF LEFT LOWER EXTREMITY: Primary | ICD-10-CM

## 2023-01-23 PROCEDURE — G8417 CALC BMI ABV UP PARAM F/U: HCPCS | Performed by: NURSE PRACTITIONER

## 2023-01-23 PROCEDURE — G8427 DOCREV CUR MEDS BY ELIG CLIN: HCPCS | Performed by: NURSE PRACTITIONER

## 2023-01-23 PROCEDURE — 1111F DSCHRG MED/CURRENT MED MERGE: CPT | Performed by: NURSE PRACTITIONER

## 2023-01-23 PROCEDURE — 99213 OFFICE O/P EST LOW 20 MIN: CPT | Performed by: NURSE PRACTITIONER

## 2023-01-23 PROCEDURE — G8484 FLU IMMUNIZE NO ADMIN: HCPCS | Performed by: NURSE PRACTITIONER

## 2023-01-23 PROCEDURE — 1036F TOBACCO NON-USER: CPT | Performed by: NURSE PRACTITIONER

## 2023-01-23 PROCEDURE — 1090F PRES/ABSN URINE INCON ASSESS: CPT | Performed by: NURSE PRACTITIONER

## 2023-01-23 PROCEDURE — 1123F ACP DISCUSS/DSCN MKR DOCD: CPT | Performed by: NURSE PRACTITIONER

## 2023-01-23 RX ORDER — PREDNISONE 20 MG/1
40 TABLET ORAL DAILY
Qty: 14 TABLET | Refills: 0 | Status: SHIPPED | OUTPATIENT
Start: 2023-01-23 | End: 2023-01-30

## 2023-01-23 ASSESSMENT — ENCOUNTER SYMPTOMS
COLOR CHANGE: 1
SHORTNESS OF BREATH: 0

## 2023-01-23 NOTE — TELEPHONE ENCOUNTER
Called [facility] and scheduled appointment for February 1st at 2:30pm in the wound clinic. Originally offered January 26th but [facility] states there is no bus transport that day.

## 2023-01-23 NOTE — TELEPHONE ENCOUNTER
Are they wrapping legs during the day to help with the swelling? As discussed with the son, I would recommend referral to wound  clinic for further evaluation. She has been through many different rounds of antibiotics without resolution. I will send in course of prednisone to see if this will help with inflammation.

## 2023-01-23 NOTE — TELEPHONE ENCOUNTER
Danielle Agee from [facility] called to say that patient's left leg is still swollen, red, and warm. She has 3 more doses left of the amoxicillin.  Please advise/

## 2023-01-23 NOTE — PROGRESS NOTES
Rg Marc is a 80 y.o. female thatpresents for Follow-up      History obtained today from Patient and Daughter. HPI    Here for follow up for cellulitis. Still painful in left lower leg, erythematous, warm to touch. Edema improved, has ace wrap in place. Done with doxycycline, 3 days of Augmentin left. I have reviewed the patient's past medical history, past surgical history, allergies,medications, social and family history and I have made updates where appropriate. Past Medical History:   Diagnosis Date    Arthritis     Cancer ([facility] [address]     skin    Colitis     Colon polyps     Fatty liver     resolved per patient    Hearing loss of both ears     Hyperlipidemia     Varicose vein of leg     Varicosity        Social History     Tobacco Use    Smoking status: Never    Smokeless tobacco: Never   Substance Use Topics    Alcohol use: Yes     Comment: rare    Drug use: No       Family History   Problem Relation Age of Onset    Arthritis Mother     Arthritis Father     Cancer Father         lung    Stroke Father     Diabetes Sister          Review of Systems   Constitutional:  Negative for appetite change. Respiratory:  Negative for shortness of breath. Cardiovascular:  Negative for chest pain. Skin:  Positive for color change. PHYSICAL EXAM:  BP (!) 128/56   Pulse 95   Temp 97.5 °F (36.4 °C) (Temporal)   Resp 14   Ht 5' (1.524 m)   Wt 157 lb (71.2 kg)   SpO2 96%   BMI 30.66 kg/m²     Physical Exam  Constitutional:       Appearance: Normal appearance. Skin:     Findings: Erythema present. No petechiae. Comments: Left lower extremity erythema improved somewhat from last week. Not worsening. Neurological:      Mental Status: She is alert. ASSESSMENT & PLAN  Alie Vasquez was seen today for follow-up. Diagnoses and all orders for this visit:    Cellulitis of left lower extremity    Referral to wound clinic placed    No follow-ups on file.     No red flag sx, Continue augmentin, has been tolerating well, and Referred to Wound clinic. Recommend ace wraps to lower legs during the day. No change in appetite or fevers. Pain with walking, recommend Tylenol TID. Prednisone was also sent in earlier today to see if this will help with inflammation. All copied or forwarded information in the progress note was verified by me to be accurate at the time of visit  Patient's past medical, surgical, social and family history were reviewed and updated     All patient questions answered. Patient voiced understanding.

## 2023-01-31 ENCOUNTER — TELEPHONE (OUTPATIENT)
Dept: FAMILY MEDICINE CLINIC | Age: 88
End: 2023-01-31

## 2023-01-31 DIAGNOSIS — F03.90 DEMENTIA WITHOUT BEHAVIORAL DISTURBANCE (HCC): Primary | ICD-10-CM

## 2023-01-31 DIAGNOSIS — R27.0 ATAXIA: ICD-10-CM

## 2023-01-31 DIAGNOSIS — Z91.81 AT HIGH RISK FOR FALLS: ICD-10-CM

## 2023-01-31 NOTE — TELEPHONE ENCOUNTER
----- Message from Alyce Miranda sent at 1/31/2023  3:58 PM EST -----  Subject: Message to Provider    QUESTIONS  Information for Provider? Pt's daughter is requesting to get a handicapped   placard for when she takes her mother to [employer]. She said that she   requested one a few months ago. Please advise.  ---------------------------------------------------------------------------  --------------  Slick Mohr [facility]  [phone]; OK to leave message on voicemail  ---------------------------------------------------------------------------  --------------  SCRIPT ANSWERS  Relationship to Patient? Other  Representative Name? Solo Holloway/daughter  Is the Representative on the appropriate HIPAA document in Epic?  Yes

## 2023-02-01 ENCOUNTER — HOSPITAL ENCOUNTER (OUTPATIENT)
Dept: WOUND CARE | Age: 88
Discharge: HOME | End: 2023-02-01
Payer: MEDICARE

## 2023-02-01 VITALS
TEMPERATURE: 96.4 F | RESPIRATION RATE: 16 BRPM | HEART RATE: 75 BPM | OXYGEN SATURATION: 95 % | DIASTOLIC BLOOD PRESSURE: 60 MMHG | SYSTOLIC BLOOD PRESSURE: 132 MMHG

## 2023-02-01 DIAGNOSIS — Z79.2 ANTIBIOTIC LONG-TERM USE: ICD-10-CM

## 2023-02-01 DIAGNOSIS — L03.116 CELLULITIS OF LEFT LEG: ICD-10-CM

## 2023-02-01 DIAGNOSIS — I87.2 CHRONIC VENOUS STASIS DERMATITIS OF BOTH LOWER EXTREMITIES: Primary | ICD-10-CM

## 2023-02-01 PROCEDURE — 99203 OFFICE O/P NEW LOW 30 MIN: CPT

## 2023-02-01 PROCEDURE — 99213 OFFICE O/P EST LOW 20 MIN: CPT | Performed by: NURSE PRACTITIONER

## 2023-02-01 RX ORDER — AMMONIUM LACTATE 12 G/100G
LOTION TOPICAL
Qty: 1 EACH | Refills: 0 | Status: SHIPPED | OUTPATIENT
Start: 2023-02-01

## 2023-02-01 ASSESSMENT — PAIN DESCRIPTION - ORIENTATION: ORIENTATION: RIGHT;LEFT

## 2023-02-01 ASSESSMENT — PAIN DESCRIPTION - LOCATION: LOCATION: LEG

## 2023-02-01 ASSESSMENT — PAIN DESCRIPTION - DESCRIPTORS: DESCRIPTORS: ACHING

## 2023-02-01 ASSESSMENT — PAIN SCALES - GENERAL: PAINLEVEL_OUTOF10: 4

## 2023-02-01 NOTE — DISCHARGE INSTRUCTIONS
Visit Discharge/Physician Orders:  - elevate legs throughout the day to help with swelling   - lab work ordered today, please have drawn prior to next appointment   - try to use a good oil based moisturizer daily  - lac hydrin ordered today for itchy dry skin on legs   - Tubigrip size E used today       Home Care: [facility]     Wound Location: bilateral leg cellulitis     Dressing orders:     1) Gather wound care supplies and arrange on clean table. 2) Wash your hands with soap and water or use alcohol based hand  for 20 seconds (sing \"Happy Birthday\" twice). 3) Cleanse wounds with normal saline or wound cleanser and gauze. Pat dry with clean gauze. 4) Left leg, right leg - tubigrip to legs daily on in the morning off at night. Patient needs some form of compression daily, if unable to obtain tubigrip you may use ace wraps. Start at base of toes and wrap to just below bend of knee. On in the morning off at night. Keep all dressings clean & dry. Follow up visit:   1 Weeks Wednesday February 8th at :    Keep next scheduled appointment. Please give 24 hour notice if unable to keep appointment. [phone]    If you experience any of the following, please call the Wound Care Service during business hours: Monday through Friday 8:00 am - 4:30 pm  ([phone]). *Increase in pain   *Temperature over 101   *Increase in drainage from your wound or a foul odor   *Uncontrolled swelling   *Need for compression bandage changes due to slippage, breakthrough drainage    If you need medical attention outside of business hours, please contact your Primary Care Doctor or go to the nearest emergency room.

## 2023-02-01 NOTE — PROGRESS NOTES
[facility]  Consult and Procedure Note      Tin RECORD NUMBER:  [id number]  AGE: 80 y.o. GENDER: female  : 1930  EPISODE DATE:  2023    SUBJECTIVE:     Chief Complaint   Patient presents with    Wound Check         HISTORY OF PRESENT ILLNESS      Corinne Johnson is a 80 y.o. female who presents today for evaluation of left lower leg cellulitis. This is reported to be an ongoing problem since December, was first noted as present during hospital stay s/p fall 22. She was also found to have UTI at that time, was treated with Rocephin daily during hospital stay and discharged home on Augmentin from 22-23. [place] since that time limited as daughter is unclear of her medications and patient is poor historian secondary to dementia. No paperwork was sent with patient from her facility. Review of prescription history shows Clindamycin 1/3/23-23, Omnicef 23-23, Doxycycilne 23-23 and Augmentin 23-23. Patient also received course of prednisone, 40 mg from -23. Patient's daughter states that erythema to leg appears to be improving. She states that facility has been utilizing ACE wraps for compression but notes that they are frequently observed to be around her lower calf and ankles when she visits. States that she typically observes a band around her legs where the wraps have cut into her leg. She denies any known recent fevers or abnormal vital signs. She states that patient appears to be at her baseline mentation, no confusion or altered behaviors reported. She denies any further needs or concerns identified.     PAST MEDICAL HISTORY             Diagnosis Date    Arthritis     Cancer ([address]     skin    Cellulitis     Colitis     Colon polyps     Fatty liver     resolved per patient    Hearing loss of both ears     Hyperlipidemia     Varicose vein of leg     Varicosity        PAST SURGICAL HISTORY     Past Surgical History:   Procedure Laterality Date    BLADDER SUSPENSION  1968    FOOT SURGERY Right 1970s    HYSTERECTOMY (CERVIX STATUS UNKNOWN)  1968    JOINT REPLACEMENT Left     left knee    JOINT REPLACEMENT Right     hip    MOHS SURGERY  1/22/14    NOSE    UT OFFICE/OUTPT VISIT,PROCEDURE ONLY N/A 9/19/2018    MOHS DEFECT REPAIR BCC DORSUM OF NOSE performed by Christy Khan MD at [facility]  2008    small blockage-adhesion       FAMILY HISTORY     Family History   Problem Relation Age of Onset    Arthritis Mother     Arthritis Father     Cancer Father         lung    Stroke Father     Diabetes Sister        SOCIAL HISTORY     Social History     Tobacco Use    Smoking status: Never    Smokeless tobacco: Never    Tobacco comments:     New patient    Substance Use Topics    Alcohol use: Yes     Comment: rare    Drug use: No       ALLERGIES     Allergies   Allergen Reactions    Sulfa Antibiotics Rash       MEDICATIONS     Current Outpatient Medications on File Prior to Encounter   Medication Sig Dispense Refill    acetaminophen (TYLENOL) 500 MG tablet Take 2 tablets by mouth 3 times daily as needed for Pain 180 tablet 1    METAMUCIL FIBER PO Take 2 capsules by mouth daily      Misc. Devices (COMMODE BEDSIDE) MISC Use as directed 1 each 0    Misc. Devices [facility]) MISC Use as directed 1 each 0    Handicap Placard MISC by Does not apply route Expires 4/28/27 1 each 0    triamcinolone (KENALOG) 0.025 % cream apply to affected area OF LOWER LIP once daily (Patient not taking: Reported on 2/1/2023)      Elastic Bandages & Supports (T.E.D. ANTI-EMBOLISM STOCKINGS) MISC Knee high SAMMY hose. Please measure pt for appropriate size. Wear during daytime, take off at night. 1 each 1    Handicap Placard MISC by Does not apply route Expires 10/30/25 1 each 0    Misc.  Devices (WALKER) MISC Wheeled walker with seat, use as directed 1 each 0    calcium carbonate-vitamin D (CALCIUM 600 + D) 600-400 MG-UNIT TABS per tab Take 1 tablet by mouth 2 times daily 60 tablet 5    Multiple Vitamins-Minerals (THERAPEUTIC MULTIVITAMIN-MINERALS) tablet Take 1 tablet by mouth daily 30 tablet 11    traZODone (DESYREL) 50 MG tablet Take 0.5 tablets by mouth nightly as needed for Sleep (Patient not taking: Reported on 2/1/2023) 15 tablet 5    mirabegron (MYRBETRIQ) 50 MG TB24 Take 50 mg by mouth daily (Patient not taking: Reported on 2/1/2023) 30 tablet 3    donepezil (ARICEPT) 5 MG tablet take 1 tablet by mouth ONCE NIGHTLY. 90 tablet 3     No current facility-administered medications on file prior to encounter. REVIEW OF SYSTEMS:     Comprehensive ROS completed, pertinent items per HPI    PHYSICAL EXAM:     /60   Pulse 75   Temp (!) 96.4 °F (35.8 °C)   Resp 16   SpO2 95%   Wt Readings from Last 3 Encounters:   01/23/23 157 lb (71.2 kg)   01/05/23 157 lb (71.2 kg)   12/29/22 157 lb 6 oz (71.4 kg)       General:  Awake, alert, no apparent distress. Appears stated age  [de-identified]: conjuctivae are clear without exudate or hemorrhage, anicteric sclera, moist oral mucosa. Chest:  Respirations regular, non-labored. Chest rise and fall equal bilaterally. Abdomen:  Soft, non tender to palpation. Neurological: Awake, alert, oriented to self  Psychiatric:  Appropriate mood and affect  Extremities: non-traumatic in appearance. Left leg hyperkeratosis, hemosiderin staining, telangiectasis and varicose veins noted today. Mild erythema noted to left lower leg in comparison to right. No open wounds or drainage noted. Pedal and posterior tibial pulses +1 bilaterally. Capillary refill greater than 3 seconds bilaterally. Feet cool to the touch bilaterally. 1+ pitting edema noted bilaterally. Skin:  Warm and dry    Bilateral lower extremities:           LABS/IMAGING     Micro:   Lab Results   Component Value Date/Time    [facility]  01/05/2023 05:32 PM     No growth 24 hours. No growth 48 hours.  No growth at 5 days IMAGING:  Soft Tissue Ultrasound. Indication: Concern for abscess. Left lower extremity cellulitis. Technique: Soft tissue ultrasound utilizing grayscale. Comparison: None       Findings:   A targeted soft tissue ultrasound was performed in the anterior aspect of    the left lower extremity in the region of clinical concern. Diffuse    subcutaneous edema with hyperemia on color Doppler, most compatible with    cellulitis. No loculated fluid collection. Impression   Impression:   Cellulitis in the interrogated regions of the left lower extremity. Negative for loculated fluid collection/abscess. This document has been electronically signed by: Murray Bullard MD on    01/05/2023 09:44 PM         PROCEDURE: VL DUP LOWER EXTREMITY VENOUS BILATERAL       CLINICAL INFORMATION: BL leg pain. COMPARISON: Left lower venous ultrasound 12/27/2022       TECHNIQUE: Venous doppler ultrasound was performed of the bilateral lower extremities using gray scale, color flow and spectral doppler imaging. FINDINGS:           There is normal color flow, spectral analysis and compressibility of the common femoral vein,  femoral vein and popliteal vein bilaterally . There is normal color flow and compressibility in the posterior tibial veins, anterior tibial veins and peroneal veins. No incidental abnormality                   Impression   No evidence of a DVT. **This report has been created using voice recognition software. It may contain minor errors which are inherent in voice recognition technology. **       Final report electronically signed by Dr. Hina Biggs on 1/5/2023 7:34 PM       ASSESSMENT     -Chronic venous stasis dermatitis   -History cellulitis, left lower leg     Patient Active Problem List   Diagnosis Code    Osteoarthritis of hip M16.9    Hyperlipidemia E78.5    Fatty liver K76.0    Colitis K52.9    Varicosities I83.90    [place] (hard of hearing) H91.90    Bradycardia R00.1    Left arm pain M79.602    Dyspnea R06.00    Status post left knee replacement Z96.652    Palpitations R00.2    Memory loss R41.3    Late onset Alzheimer's disease without behavioral disturbance ([facility]) G30.1, F02.80    Fall at home, initial encounter W19. XXXA, Y92.009    Septic shock ([facility]) A41.9, R65.21    Cellulitis of left lower extremity L03. 116    Right hip pain M25.551    Acute cough R05.1    Dementia ([facility]) F03.90    Normocytic anemia D64.9    Overactive bladder N32.81     PLAN     Patient examined and evaluated. All available lab work, radiology studies, and progress notes pertaining to Reymundo Pruitt reviewed prior to or during patient visit today.    -History cellulitis, left lower leg,- Mild discoloration continues to left lower leg, improved per patient daughter's report. Patient has been on multiple antibiotics since December. She is unsure if she is currently on any antibiotics, no MAR available for review from facility. Will obtain blood work today for safety purposes prior to any further antibiotics, orders for CBC, CMP, CRP, ESR placed today. Patient appears non-toxic today, daughter indicates no recent signs of infectious process. No indications for need for antibiotic therapy today prior to obtaining blood work. Signs and symptoms of infection reviewed with patient and her daughter. They were advised to call clinic or seek emergency care should these occur. Chronic venous stasis dermatitis- Hyperkeratosis and edema of legs noted today. Patient states that her legs itch throughout the day. Prescription sent for LacHydrin, apply to intact skin of bilateral lower legs daily as needed for dry, flaky, pruritic skin. ACE wraps are reported to frequently fall down leading to pressure around her mid-calf and ankle regions. Tubigrips applied today as alternative compression garments, apply in the morning and remove prior to bed.   Discussed concern for fall hazard with ACE wraps that are falling down or loose. Recommend against use of ACE wraps if this continues. Elevate legs throughout the day as able. Follow up 1 week. Call clinic with any needs or concerns prior to scheduled visit. All questions and concerns addressed prior to discharge from today's visit. Please see attached Discharge Instructions    Written patient dismissal instructions given to patient and signed by patient or POA. Treatment:   Orders Placed This Encounter   Procedures    CBC     Standing Status:   Future     Standing Expiration Date:   2/1/2024    Comprehensive Metabolic Panel     Standing Status:   Future     Standing Expiration Date:   2/1/2024    C-Reactive Protein     Standing Status:   Future     Standing Expiration Date:   2/1/2024    Sedimentation Rate     Standing Status:   Future     Standing Expiration Date:   2/1/2024     I spent a total of 25 minutes face to face with Arminda Bishop  on 2/1/2023. Time spent includes review of previous progress notes, reviewing and discussing test results, education on plan of care for presenting diagnosis, answering questions, providing instructions on treatment and/or medications ordered, reviewing importance of compliance with outline treatment plan and any identifiable barriers to compliance and coordination of care based on plan and assessment as noted. Discharge Instructions     Discharge Instructions         Visit Discharge/Physician Orders:  - elevate legs throughout the day to help with swelling   - lab work ordered today, please have drawn prior to next appointment   - try to use a good oil based moisturizer daily  - lac hydrin ordered today for itchy dry skin on legs   - Tubigrip size E used today       Home Care: [facility]     Wound Location: bilateral leg cellulitis     Dressing orders:     1) Gather wound care supplies and arrange on clean table.      2) Wash your hands with soap and water or use alcohol based hand  for 20 seconds (sing \"Happy Birthday\" twice).    3) Cleanse wounds with normal saline or wound cleanser and gauze. Pat dry with clean gauze.    4) Left leg, right leg - tubigrip to legs daily on in the morning off at night. Patient needs some form of compression daily, if unable to obtain tubigrip you may use ace wraps. Start at base of toes and wrap to just below bend of knee.  On in the morning off at night.         Keep all dressings clean & dry.    Follow up visit:   1 Weeks Wednesday February 8th at 3pm    Supplies:    Keep next scheduled appointment. Please give 24 hour notice if unable to keep appointment. [phone]    If you experience any of the following, please call the Wound Care Service during business hours: Monday through Friday 8:00 am - 4:30 pm  ([phone]).   *Increase in pain   *Temperature over 101   *Increase in drainage from your wound or a foul odor   *Uncontrolled swelling   *Need for compression bandage changes due to slippage, breakthrough drainage    If you need medical attention outside of business hours, please contact your Primary Care Doctor or go to the nearest emergency room.                Electronically signed by DHEERAJ Guzman CNP on 2/1/2023 at 3:43 PM

## 2023-02-01 NOTE — PLAN OF CARE
Problem: Wound:  Goal: Will show signs of wound healing; wound closure and no evidence of infection  Description: Will show signs of wound healing; wound closure and no evidence of infection  Outcome: Progressing  Note: Patient seen for bilateral leg redness. Wound shows signs of proper closure and healing. Lab work ordered. Tubigrips applied. Follow up in 1 weeks. See AVS for order changes. Care plan reviewed with patient and daughter. Patient and daughter verbalize understanding of the plan of care and contribute to goal setting.

## 2023-02-03 ENCOUNTER — TELEPHONE (OUTPATIENT)
Dept: WOUND CARE | Age: 88
End: 2023-02-03

## 2023-02-03 NOTE — TELEPHONE ENCOUNTER
[facility] called to get orders from last visit.  Faxed to [phone]

## 2023-02-08 ENCOUNTER — HOSPITAL ENCOUNTER (OUTPATIENT)
Dept: WOUND CARE | Age: 88
Discharge: HOME | End: 2023-02-08
Payer: MEDICARE

## 2023-02-08 ENCOUNTER — NURSE ONLY (OUTPATIENT)
Dept: LAB | Age: 88
End: 2023-02-08

## 2023-02-08 VITALS
HEART RATE: 69 BPM | OXYGEN SATURATION: 97 % | TEMPERATURE: 97.6 F | SYSTOLIC BLOOD PRESSURE: 153 MMHG | RESPIRATION RATE: 18 BRPM | DIASTOLIC BLOOD PRESSURE: 68 MMHG

## 2023-02-08 DIAGNOSIS — I87.2 CHRONIC VENOUS STASIS DERMATITIS OF BOTH LOWER EXTREMITIES: ICD-10-CM

## 2023-02-08 DIAGNOSIS — Z79.2 ANTIBIOTIC LONG-TERM USE: ICD-10-CM

## 2023-02-08 DIAGNOSIS — L03.116 CELLULITIS OF LEFT LEG: ICD-10-CM

## 2023-02-08 LAB
ALBUMIN SERPL BCG-MCNC: 4 G/DL (ref 3.5–5.1)
ALP SERPL-CCNC: 148 U/L (ref 38–126)
ALT SERPL W/O P-5'-P-CCNC: 15 U/L (ref 11–66)
ANION GAP SERPL CALC-SCNC: 11 MEQ/L (ref 8–16)
AST SERPL-CCNC: 21 U/L (ref 5–40)
BILIRUB SERPL-MCNC: 0.2 MG/DL (ref 0.3–1.2)
BUN SERPL-MCNC: 13 MG/DL (ref 7–22)
CALCIUM SERPL-MCNC: 9.4 MG/DL (ref 8.5–10.5)
CHLORIDE SERPL-SCNC: 101 MEQ/L (ref 98–111)
CO2 SERPL-SCNC: 26 MEQ/L (ref 23–33)
CREAT SERPL-MCNC: 0.8 MG/DL (ref 0.4–1.2)
CRP SERPL-MCNC: 0.39 MG/DL (ref 0–1)
DEPRECATED RDW RBC AUTO: 55.7 FL (ref 35–45)
ERYTHROCYTE [DISTWIDTH] IN BLOOD BY AUTOMATED COUNT: 15.6 % (ref 11.5–14.5)
ERYTHROCYTE [SEDIMENTATION RATE] IN BLOOD BY WESTERGREN METHOD: 60 MM/HR (ref 0–20)
GFR SERPL CREATININE-BSD FRML MDRD: > 60 ML/MIN/1.73M2
GLUCOSE SERPL-MCNC: 73 MG/DL (ref 70–108)
HCT VFR BLD AUTO: 38.6 % (ref 37–47)
HGB BLD-MCNC: 12.3 GM/DL (ref 12–16)
MCH RBC QN AUTO: 30.6 PG (ref 26–33)
MCHC RBC AUTO-ENTMCNC: 31.9 GM/DL (ref 32.2–35.5)
MCV RBC AUTO: 96 FL (ref 81–99)
PLATELET # BLD AUTO: 237 THOU/MM3 (ref 130–400)
PMV BLD AUTO: 10.5 FL (ref 9.4–12.4)
POTASSIUM SERPL-SCNC: 4.5 MEQ/L (ref 3.5–5.2)
PROT SERPL-MCNC: 6.8 G/DL (ref 6.1–8)
RBC # BLD AUTO: 4.02 MILL/MM3 (ref 4.2–5.4)
SODIUM SERPL-SCNC: 138 MEQ/L (ref 135–145)
WBC # BLD AUTO: 6.4 THOU/MM3 (ref 4.8–10.8)

## 2023-02-08 PROCEDURE — 99213 OFFICE O/P EST LOW 20 MIN: CPT

## 2023-02-08 ASSESSMENT — PAIN DESCRIPTION - ORIENTATION: ORIENTATION: RIGHT;LEFT

## 2023-02-08 ASSESSMENT — PAIN DESCRIPTION - DESCRIPTORS: DESCRIPTORS: ACHING

## 2023-02-08 ASSESSMENT — PAIN DESCRIPTION - LOCATION: LOCATION: LEG

## 2023-02-08 ASSESSMENT — PAIN SCALES - GENERAL: PAINLEVEL_OUTOF10: 3

## 2023-02-08 NOTE — PROGRESS NOTES
Called facility to ask about labs that were ordered last week. Spoke to General Griffith at facility she states she was unaware the patient had an appointment today and that she did not see any orders from the last visit. Informed [facility] that labs were ordered and tubigrips to apply to the patients legs. If labs need drawn the facility only does them on Tuesdays. Discussed with provider. Provider ordered labs to be done today. Patients son to take patient to out patient lab. Also gave orders for lac hydrin lotion and tubigrips to the son and a copy for the facility. Provider to call facility and son after lab results are received.

## 2023-02-08 NOTE — PROGRESS NOTES
Patients son called back after visit stating that it was the sisters fault that the orders did not get to the facility after the last visit.

## 2023-02-08 NOTE — PROGRESS NOTES
[facility]  Consult and Procedure Note      Tin RECORD NUMBER:  [id number]  AGE: 80 y.o. GENDER: female  : 1930  EPISODE DATE:  2023    SUBJECTIVE:     Chief Complaint   Patient presents with    Wound Check     Bilateral legs           HISTORY OF PRESENT ILLNESS      Ree Most is a 80 y.o. female who presents today for evaluation of left lower leg cellulitis. This is reported to be an ongoing problem since December, was first noted as present during hospital stay s/p fall 22. She was also found to have UTI at that time, was treated with Rocephin daily during hospital stay and discharged home on Augmentin from 22-23. Andreafski since that time limited as daughter is unclear of her medications and patient is poor historian secondary to dementia. No paperwork was sent with patient from her facility. Review of prescription history shows Clindamycin 1/3/23-23, Omnicef 23-23, Doxycycline 23-23 and Augmentin 23-23. Patient also received course of prednisone, 40 mg from -23. At last visit patient's daughter stated that erythema to leg appears to be improving. Patient appeared non-toxic at that time and orders were written for blood work to be completed to better evaluate for any infectious process as well as to check renal and liver function for safety monitoring due to multiple antibiotics over a short course of time. Orders were also written for use of lacHydrin for her dry, flaky, pruritic skin and daily use of tubigrips. Patient came to visit with her son today, no packet of information was sent with patient. Clinic nurse spoke to [address] [place] [address] who states that they did not draw blood work that was ordered at last visit. Patient's son states that he has not observed any lotion being placed on her legs nor tubigrips.  He is unsure if patient has had any fevers or chills but states she appears to be at her baseline mentation. They deny any further needs or concerns identified. PAST MEDICAL HISTORY             Diagnosis Date    Arthritis     Cancer ([address]     skin    Cellulitis     Colitis     Colon polyps     Fatty liver     resolved per patient    Hearing loss of both ears     Hyperlipidemia     Varicose vein of leg     Varicosity        PAST SURGICAL HISTORY     Past Surgical History:   Procedure Laterality Date    BLADDER SUSPENSION  1968    FOOT SURGERY Right 1970s    HYSTERECTOMY (CERVIX STATUS UNKNOWN)  1968    JOINT REPLACEMENT Left     left knee    JOINT REPLACEMENT Right     hip    MOHS SURGERY  1/22/14    NOSE    AK OFFICE/OUTPT VISIT,PROCEDURE ONLY N/A 9/19/2018    MOHS DEFECT REPAIR BCC DORSUM OF NOSE performed by Luis Carlos Mitchell MD at [address] [place] [address]  2008    small blockage-adhesion       FAMILY HISTORY     Family History   Problem Relation Age of Onset    Arthritis Mother     Arthritis Father     Cancer Father         lung    Stroke Father     Diabetes Sister        SOCIAL HISTORY     Social History     Tobacco Use    Smoking status: Never     Passive exposure: Never    Smokeless tobacco: Never    Tobacco comments:     New patient    Vaping Use    Vaping Use: Never used   Substance Use Topics    Alcohol use: Yes     Comment: rare    Drug use: No       ALLERGIES     Allergies   Allergen Reactions    Sulfa Antibiotics Rash       MEDICATIONS     Current Outpatient Medications on File Prior to Encounter   Medication Sig Dispense Refill    ammonium lactate (LAC-HYDRIN) 12 % lotion Apply topically daily as needed to intact skin on both legs. 1 each 0    acetaminophen (TYLENOL) 500 MG tablet Take 2 tablets by mouth 3 times daily as needed for Pain 180 tablet 1    METAMUCIL FIBER PO Take 2 capsules by mouth daily      Misc. Devices (COMMODE BEDSIDE) MISC Use as directed 1 each 0    Misc.  Devices [facility]) MISC Use as directed 1 each 0    Handicap Placard MISC by Does not apply route Expires 4/28/27 1 each 0    triamcinolone (KENALOG) 0.025 % cream apply to affected area OF LOWER LIP once daily (Patient not taking: No sig reported)      Elastic Bandages & Supports (T.E.D. ANTI-EMBOLISM STOCKINGS) MISC Knee high SAMMY hose. Please measure pt for appropriate size. Wear during daytime, take off at night. 1 each 1    Handicap Placard MISC by Does not apply route Expires 10/30/25 1 each 0    Misc. Devices (WALKER) MISC Wheeled walker with seat, use as directed 1 each 0    calcium carbonate-vitamin D (CALCIUM 600 + D) 600-400 MG-UNIT TABS per tab Take 1 tablet by mouth 2 times daily 60 tablet 5    Multiple Vitamins-Minerals (THERAPEUTIC MULTIVITAMIN-MINERALS) tablet Take 1 tablet by mouth daily 30 tablet 11    traZODone (DESYREL) 50 MG tablet Take 0.5 tablets by mouth nightly as needed for Sleep (Patient not taking: No sig reported) 15 tablet 5    mirabegron (MYRBETRIQ) 50 MG TB24 Take 50 mg by mouth daily (Patient not taking: No sig reported) 30 tablet 3    donepezil (ARICEPT) 5 MG tablet take 1 tablet by mouth ONCE NIGHTLY. 90 tablet 3     No current facility-administered medications on file prior to encounter. REVIEW OF SYSTEMS:     Comprehensive ROS completed, pertinent items per HPI    PHYSICAL EXAM:     BP (!) 153/68   Pulse 69   Temp 97.6 °F (36.4 °C) (Infrared)   Resp 18   SpO2 97%   Wt Readings from Last 3 Encounters:   01/23/23 157 lb (71.2 kg)   01/05/23 157 lb (71.2 kg)   12/29/22 157 lb 6 oz (71.4 kg)       General:  Awake, alert, no apparent distress. Appears stated age  [de-identified]: conjuctivae are clear without exudate or hemorrhage, anicteric sclera, moist oral mucosa. Chest:  Respirations regular, non-labored. Chest rise and fall equal bilaterally. Abdomen:  Soft, non tender to palpation. Neurological: Awake, alert, oriented to self  Psychiatric:  Appropriate mood and affect  Extremities: non-traumatic in appearance.   Left leg hyperkeratosis, hemosiderin staining, telangiectasis and varicose veins noted today. Erythema noted to left lower leg with mild warmth to the touch-appears worsened as compared to last week. No open wounds or drainage noted. Pedal and posterior tibial pulses +1 bilaterally. Capillary refill greater than 3 seconds bilaterally. Feet cool to the touch bilaterally. 1+ pitting edema noted bilaterally. Skin:  Warm and dry    Bilateral lower extremities:             LABS/IMAGING     Micro:   Lab Results   Component Value Date/Time    [facility]  01/05/2023 05:32 PM     No growth 24 hours. No growth 48 hours. No growth at 5 days       IMAGING:  Soft Tissue Ultrasound. Indication: Concern for abscess. Left lower extremity cellulitis. Technique: Soft tissue ultrasound utilizing grayscale. Comparison: None       Findings:   A targeted soft tissue ultrasound was performed in the anterior aspect of    the left lower extremity in the region of clinical concern. Diffuse    subcutaneous edema with hyperemia on color Doppler, most compatible with    cellulitis. No loculated fluid collection. Impression   Impression:   Cellulitis in the interrogated regions of the left lower extremity. Negative for loculated fluid collection/abscess. This document has been electronically signed by: Omero Smith MD on    01/05/2023 09:44 PM         PROCEDURE: VL DUP LOWER EXTREMITY VENOUS BILATERAL       CLINICAL INFORMATION: BL leg pain. COMPARISON: Left lower venous ultrasound 12/27/2022       TECHNIQUE: Venous doppler ultrasound was performed of the bilateral lower extremities using gray scale, color flow and spectral doppler imaging. FINDINGS:           There is normal color flow, spectral analysis and compressibility of the common femoral vein,  femoral vein and popliteal vein bilaterally . There is normal color flow and compressibility in the posterior tibial veins, anterior tibial veins and peroneal veins. No incidental abnormality                   Impression   No evidence of a DVT. **This report has been created using voice recognition software. It may contain minor errors which are inherent in voice recognition technology. **       Final report electronically signed by Dr. 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 XXXA, Y92.009    Septic shock (HCC) A41.9, R65.21    Cellulitis of left lower extremity L03. 116    Right hip pain M25.551    Acute cough R05.1    Dementia ([facility]) F03.90    Normocytic anemia D64.9    Overactive bladder N32.81     PLAN     Patient examined and evaluated. All available lab work, radiology studies, and progress notes pertaining to Lora Michaud reviewed prior to or during patient visit today.    -History cellulitis, left lower leg- Erythema and warmth to the touch appears worsened at today's visit. Patient reports pain to leg on multiple occasions during visit. Unfortunately, blood work did not get drawn as ordered. Patient son states that he will take her directly to lab from visit today to have drawn as facility nurse states that blood work is only drawn on Tuesday's at their facility. Patient appears to have reoccurrence of cellulitis. Will call facility with antibiotic orders once lab work is reviewed.   It is unclear if she is currently on any antibiotics, no MAR available for review from facility, patient son does not know her current medications. Patient appears non-toxic today, son denies any alterations from baseline mentation. No indications for need for hospitalization. Signs and symptoms of infection reviewed with patient and her daughter. They were advised to call clinic or seek emergency care should these occur. Chronic venous stasis dermatitis- Hyperkeratosis and edema of legs continues, appears slightly worsened today. Recommend use of LacHydrin as prescribed at last visit, apply to intact skin of bilateral lower legs daily for dry, flaky, pruritic skin. Continue daily use of Tubigrips for compression, apply in the morning, remove prior to bed. Follow up 1 week. Call clinic with any needs or concerns prior to scheduled visit. All questions and concerns addressed prior to discharge from today's visit. Please see attached Discharge Instructions    Written patient dismissal instructions given to patient and signed by patient or POA. I spent a total of 28 minutes face to face with Hodan Shi  on 2/8/2023. Time spent includes review of previous progress notes, reviewing and discussing test results, education on plan of care for presenting diagnosis, answering questions, providing instructions on treatment and/or medications ordered, reviewing importance of compliance with outline treatment plan and any identifiable barriers to compliance and coordination of care based on plan and assessment as noted.        Discharge Instructions     Discharge Instructions         Visit Discharge/Physician Orders:  - elevate legs throughout the day to help with swelling   - lab work ordered to be done today  - try to use a good oil based moisturizer daily  - lac hydrin ordered today for itchy dry skin on legs   - Tubigrip size E used today   - we will call rashaadrose with antibiotic orders after we get the lab results        Home Care: [facility]      Wound Location: bilateral leg cellulitis      Dressing orders:      1) Gather wound care supplies and arrange on clean table. 2) Wash your hands with soap and water or use alcohol based hand  for 20 seconds (sing \"Happy Birthday\" twice). 3) Cleanse wounds with normal saline or wound cleanser and gauze. Pat dry with clean gauze. 4) Left leg, right leg - tubigrip to legs daily on in the morning off at night. Patient needs some form of compression daily, if unable to obtain tubigrip you may use ace wraps. Start at base of toes and wrap to just below bend of knee. On in the morning off at night. Keep all dressings clean & dry. Follow up visit:   1 Week Wednesday February 15th at 3pm        Keep next scheduled appointment. Please give 24 hour notice if unable to keep appointment. [phone]     If you experience any of the following, please call the Wound Care Service during business hours: Monday through Friday 8:00 am - 4:30 pm  ([phone]). *Increase in pain              *Temperature over 101              *Increase in drainage from your wound or a foul odor              *Uncontrolled swelling              *Need for compression bandage changes due to slippage, breakthrough drainage     If you need medical attention outside of business hours, please contact your Primary Care Doctor or go to the nearest emergency room.           Electronically signed by DHEERAJ Arnold CNP on 2/8/2023 at 4:37 PM

## 2023-02-08 NOTE — PLAN OF CARE
Problem: Wound:  Goal: Will show signs of wound healing; wound closure and no evidence of infection  Description: Will show signs of wound healing; wound closure and no evidence of infection  Outcome: Not Progressing   Pt. Seen today for bilateral leg edema and redness see AVS for orders. Follow up in 1 week. Care plan reviewed with patient and son. Patient and son verbalize understanding of the plan of care and contribute to goal setting.

## 2023-02-08 NOTE — DISCHARGE INSTRUCTIONS
Visit Discharge/Physician Orders:  - elevate legs throughout the day to help with swelling   - lab work ordered to be done today  - try to use a good oil based moisturizer daily  - lac hydrin ordered today for itchy dry skin on legs   - Tubigrip size E used today   - we will call prim[facility] with antibiotic orders after we get the lab results        Home Care: [facility]      Wound Location: bilateral leg cellulitis      Dressing orders:      1) Gather wound care supplies and arrange on clean table. 2) Wash your hands with soap and water or use alcohol based hand  for 20 seconds (sing \"Happy Birthday\" twice). 3) Cleanse wounds with normal saline or wound cleanser and gauze. Pat dry with clean gauze. 4) Left leg, right leg - tubigrip to legs daily on in the morning off at night. Patient needs some form of compression daily, if unable to obtain tubigrip you may use ace wraps. Start at base of toes and wrap to just below bend of knee. On in the morning off at night. Keep all dressings clean & dry. Follow up visit:   1 Week Wednesday February 15th at 3pm        Keep next scheduled appointment. Please give 24 hour notice if unable to keep appointment. [phone]     If you experience any of the following, please call the Wound Care Service during business hours: Monday through Friday 8:00 am - 4:30 pm  ([phone]). *Increase in pain              *Temperature over 101              *Increase in drainage from your wound or a foul odor              *Uncontrolled swelling              *Need for compression bandage changes due to slippage, breakthrough drainage     If you need medical attention outside of business hours, please contact your Primary Care Doctor or go to the nearest emergency room.

## 2023-02-09 ENCOUNTER — TELEPHONE (OUTPATIENT)
Dept: INFECTIOUS DISEASES | Age: 88
End: 2023-02-09

## 2023-02-09 ENCOUNTER — TELEPHONE (OUTPATIENT)
Dept: WOUND CARE | Age: 88
End: 2023-02-09

## 2023-02-09 DIAGNOSIS — L03.116 CELLULITIS OF LEFT LOWER EXTREMITY: Primary | ICD-10-CM

## 2023-02-09 RX ORDER — CEFDINIR 300 MG/1
300 CAPSULE ORAL 2 TIMES DAILY
Qty: 28 CAPSULE | Refills: 0 | Status: SHIPPED | OUTPATIENT
Start: 2023-02-21 | End: 2023-03-07

## 2023-02-09 NOTE — TELEPHONE ENCOUNTER
----- Message from DHEERAJ Harkins CNP sent at 2/9/2023 10:47 AM EST -----  Regarding: appointment  Please change appointment to 2/22/23 at 3 PM.      Thank you,     Pawan Otoole

## 2023-02-22 ENCOUNTER — HOSPITAL ENCOUNTER (OUTPATIENT)
Dept: WOUND CARE | Age: 88
Discharge: HOME | End: 2023-02-22
Payer: MEDICARE

## 2023-02-22 VITALS
HEART RATE: 77 BPM | SYSTOLIC BLOOD PRESSURE: 159 MMHG | RESPIRATION RATE: 18 BRPM | DIASTOLIC BLOOD PRESSURE: 72 MMHG | TEMPERATURE: 97.1 F | OXYGEN SATURATION: 95 %

## 2023-02-22 DIAGNOSIS — I87.2 CHRONIC VENOUS STASIS DERMATITIS OF BOTH LOWER EXTREMITIES: ICD-10-CM

## 2023-02-22 PROBLEM — A41.9 SEPTIC SHOCK (HCC): Status: RESOLVED | Noted: 2022-12-31 | Resolved: 2023-02-22

## 2023-02-22 PROBLEM — L03.116 CELLULITIS OF LEFT LOWER EXTREMITY: Status: RESOLVED | Noted: 2023-01-05 | Resolved: 2023-02-22

## 2023-02-22 PROBLEM — R65.21 SEPTIC SHOCK (HCC): Status: RESOLVED | Noted: 2022-12-31 | Resolved: 2023-02-22

## 2023-02-22 PROCEDURE — 99213 OFFICE O/P EST LOW 20 MIN: CPT

## 2023-02-22 NOTE — PLAN OF CARE
Problem: Wound:  Goal: Will show signs of wound healing; wound closure and no evidence of infection  Description: Will show signs of wound healing; wound closure and no evidence of infection  Outcome: Progressing     Patient presents to wound clinic for bilateral legs. See avs for discharge instructions. Follow up visit: As needed. Call with any concerns or symptoms infection. Care plan reviewed with patient. Patient verbalize understanding of the plan of care and contribute to goal setting.

## 2023-02-22 NOTE — PROGRESS NOTES
[facility]  Consult and Procedure Note      Tin RECORD NUMBER:  [id number]  AGE: 80 y.o. GENDER: female  : 1930  EPISODE DATE:  2023    SUBJECTIVE:     Chief Complaint   Patient presents with    Wound Check     Bilateral legs            HISTORY OF PRESENT ILLNESS      Ceci Llamas is a 80 y.o. female who presents today for evaluation of left lower leg cellulitis. This is reported to be an ongoing problem since December, was first noted as present during hospital stay s/p fall 22. She was also found to have UTI at that time, was treated with Rocephin daily during hospital stay and discharged home on Augmentin from 22-23. [place] since that time limited as daughter is unclear of her medications and patient is poor historian secondary to dementia. No paperwork was sent with patient from her facility. Review of prescription history shows Clindamycin 1/3/23-23, Omnicef 23-23, Doxycycline 23-23 and Augmentin 23-23. Patient also received course of prednisone, 40 mg from -23. At last visit orders were written for blood work, lacHydrin for her dry, flaky, pruritic skin and daily use of compression socks. Lab work showed slight elevation of her markers of inflammation for which she was prescribed Omincef x14 days. Today, patient's son reports Benita Posey has not yet been obtained as it was unavailable at the pharmacy. He is unsure if patient has had any fevers or chills but states she appears to be at her baseline mentation. They deny any further needs or concerns identified.     PAST MEDICAL HISTORY             Diagnosis Date    Arthritis     Cancer ([address]     skin    Cellulitis     Colitis     Colon polyps     Fatty liver     resolved per patient    Hearing loss of both ears     Hyperlipidemia     Varicose vein of leg     Varicosity        PAST SURGICAL HISTORY     Past Surgical History:   Procedure Laterality Date    BLADDER SUSPENSION  1968    FOOT SURGERY Right 1970s    HYSTERECTOMY (CERVIX STATUS UNKNOWN)  1968    JOINT REPLACEMENT Left     left knee    JOINT REPLACEMENT Right     hip    MOHS SURGERY  1/22/14    NOSE    [facility] OFFICE/OUTPT VISIT,PROCEDURE ONLY N/A 9/19/2018    MOHS DEFECT REPAIR BCC DORSUM OF NOSE performed by Yanni Miller MD at [address] [place] [address]  2008    small blockage-adhesion       FAMILY HISTORY     Family History   Problem Relation Age of Onset    Arthritis Mother     Arthritis Father     Cancer Father         lung    Stroke Father     Diabetes Sister        SOCIAL HISTORY     Social History     Tobacco Use    Smoking status: Never     Passive exposure: Never    Smokeless tobacco: Never   Vaping Use    Vaping Use: Never used   Substance Use Topics    Alcohol use: Yes     Comment: rare    Drug use: No       ALLERGIES     Allergies   Allergen Reactions    Sulfa Antibiotics Rash       MEDICATIONS     Current Outpatient Medications on File Prior to Encounter   Medication Sig Dispense Refill    cefdinir (OMNICEF) 300 MG capsule Take 1 capsule by mouth 2 times daily for 14 days 28 capsule 0    ammonium lactate (LAC-HYDRIN) 12 % lotion Apply topically daily as needed to intact skin on both legs. 1 each 0    acetaminophen (TYLENOL) 500 MG tablet Take 2 tablets by mouth 3 times daily as needed for Pain 180 tablet 1    METAMUCIL FIBER PO Take 2 capsules by mouth daily      Misc. Devices (COMMODE BEDSIDE) MISC Use as directed 1 each 0    Misc. Devices [facility]) MISC Use as directed 1 each 0    Handicap Placard MISC by Does not apply route Expires 4/28/27 1 each 0    triamcinolone (KENALOG) 0.025 % cream apply to affected area OF LOWER LIP once daily (Patient not taking: No sig reported)      Elastic Bandages & Supports (T.E.D. ANTI-EMBOLISM STOCKINGS) MISC Knee high SAMMY hose. Please measure pt for appropriate size. Wear during daytime, take off at night.  1 each 1    Handicap Placard MISC by Does not apply route Expires 10/30/25 1 each 0    Misc. Devices (WALKER) MISC Wheeled walker with seat, use as directed 1 each 0    calcium carbonate-vitamin D (CALCIUM 600 + D) 600-400 MG-UNIT TABS per tab Take 1 tablet by mouth 2 times daily 60 tablet 5    Multiple Vitamins-Minerals (THERAPEUTIC MULTIVITAMIN-MINERALS) tablet Take 1 tablet by mouth daily 30 tablet 11    traZODone (DESYREL) 50 MG tablet Take 0.5 tablets by mouth nightly as needed for Sleep (Patient not taking: No sig reported) 15 tablet 5    mirabegron (MYRBETRIQ) 50 MG TB24 Take 50 mg by mouth daily (Patient not taking: No sig reported) 30 tablet 3    donepezil (ARICEPT) 5 MG tablet take 1 tablet by mouth ONCE NIGHTLY. 90 tablet 3     No current facility-administered medications on file prior to encounter. REVIEW OF SYSTEMS:     Comprehensive ROS completed, pertinent items per HPI    PHYSICAL EXAM:     BP (!) 159/72   Pulse 77   Temp 97.1 °F (36.2 °C) (Infrared)   Resp 18   SpO2 95%   Wt Readings from Last 3 Encounters:   01/23/23 157 lb (71.2 kg)   01/05/23 157 lb (71.2 kg)   12/29/22 157 lb 6 oz (71.4 kg)       General:  Awake, alert, no apparent distress. Appears stated age  [de-identified]: conjuctivae are clear without exudate or hemorrhage, anicteric sclera, moist oral mucosa. Chest:  Respirations regular, non-labored. Chest rise and fall equal bilaterally. Abdomen:  Soft, non tender to palpation. Neurological: Awake, alert, oriented to self  Psychiatric:  Appropriate mood and affect  Extremities: non-traumatic in appearance. Bilateral lower extremity hyperkeratosis, hemosiderin staining, telangiectasis and varicose veins noted today. No open wounds or drainage noted. No warmth to the touch noted. Pedal and posterior tibial pulses +1 bilaterally. Capillary refill greater than 3 seconds bilaterally. Feet cool to the touch bilaterally. 2+ pitting edema noted bilaterally.    Skin:  Warm and dry    Left lower extremities:           LABS/IMAGING     Micro:   Lab Results   Component Value Date/Time    [facility]  01/05/2023 05:32 PM     No growth 24 hours. No growth 48 hours. No growth at 5 days       IMAGING:  Soft Tissue Ultrasound. Indication: Concern for abscess. Left lower extremity cellulitis. Technique: Soft tissue ultrasound utilizing grayscale. Comparison: None       Findings:   A targeted soft tissue ultrasound was performed in the anterior aspect of    the left lower extremity in the region of clinical concern. Diffuse    subcutaneous edema with hyperemia on color Doppler, most compatible with    cellulitis. No loculated fluid collection. Impression   Impression:   Cellulitis in the interrogated regions of the left lower extremity. Negative for loculated fluid collection/abscess. This document has been electronically signed by: Yazan Hudson MD on    01/05/2023 09:44 PM         PROCEDURE: VL DUP LOWER EXTREMITY VENOUS BILATERAL       CLINICAL INFORMATION: BL leg pain. COMPARISON: Left lower venous ultrasound 12/27/2022       TECHNIQUE: Venous doppler ultrasound was performed of the bilateral lower extremities using gray scale, color flow and spectral doppler imaging. FINDINGS:           There is normal color flow, spectral analysis and compressibility of the common femoral vein,  femoral vein and popliteal vein bilaterally . There is normal color flow and compressibility in the posterior tibial veins, anterior tibial veins and peroneal veins. No incidental abnormality                   Impression   No evidence of a DVT. **This report has been created using voice recognition software. It may contain minor errors which are inherent in voice recognition technology. **       Final report electronically signed by Dr. Sally Jett on 1/5/2023 7:34 PM       ASSESSMENT     -Chronic venous stasis dermatitis   -History cellulitis, left lower leg     Patient Active Problem List   Diagnosis Code    Osteoarthritis of hip M16.9    Hyperlipidemia E78.5    Fatty liver K76.0    Colitis K52.9    Varicosities I83.90    [place] (hard of hearing) H91.90    Bradycardia R00.1    Left arm pain M79.602    Dyspnea R06.00    Status post left knee replacement Z96.652    Palpitations R00.2    Memory loss R41.3    Late onset Alzheimer's disease without behavioral disturbance ([facility] [address] G30.1, F02.80    Fall at home, initial encounter [address] XXXA, Y92.009    Septic shock (HCC) A41.9, R65.21    Cellulitis of left lower extremity L03. 116    Right hip pain M25.551    Acute cough R05.1    Dementia (HCC) F03.90    Normocytic anemia D64.9    Overactive bladder N32.81     PLAN     Patient examined and evaluated. All available lab work, radiology studies, and progress notes pertaining to Jn Celaya reviewed prior to or during patient visit today.    -History cellulitis, left lower leg- Most recent blood work reviewed, shows WBC within normal limits. It does not appear as though CRP or ESR were completed. Clinically, patient's leg appears improved. Hemosiderin staining continues but no erythema or warmth to the touch noted. Case reviewed with Dr. Nora Colon who advises against any further antibiotic therapy at this time. He recommends ongoing use of compression garments for better control of edema. This was relayed to patient and her son, present for visit. Tubigrips on arrival today were very loose leading to 2+ pitting edema of her legs. Education provided on the importance of good control of edema through use of compression socks daily in prevention of wounds and recurrent infection. Signs and symptoms of infection reviewed with patient and her daughter. They were advised to call clinic or seek emergency care should these occur. Chronic venous stasis dermatitis- Hyperkeratosis and edema of legs continues, appears slightly worsened today.   Recommend use of LacHydrin as prescribed at initial visit, apply to intact skin of bilateral lower legs daily for dry, flaky, pruritic skin. Continue daily use of  compression socks, apply in the morning, remove prior to bed. Follow up as needed. Call clinic with any needs or concerns prior to scheduled visit. All questions and concerns addressed prior to discharge from today's visit. Please see attached Discharge Instructions    Written patient dismissal instructions given to patient and signed by patient or POA. I spent a total of 20 minutes face to face with Sheba Blanca  on 2/22/2023. Time spent includes review of previous progress notes, reviewing and discussing test results, education on plan of care for presenting diagnosis, answering questions, providing instructions on treatment and/or medications ordered, reviewing importance of compliance with outline treatment plan and any identifiable barriers to compliance and coordination of care based on plan and assessment as noted. Discharge Instructions     Discharge Instructions           Discharge Instructions      Visit Discharge/Physician Orders:  - Okay to use lac hydrin in the evening after you remove patients compression.   - Finish Omnicef antibiotic as prescribed. - Elevate legs throughout the day to help with swelling , also while patient is in her recliner.   - Tubigrip size E used today     Home Care: [facility]      Wound Location: bilateral legs     Dressing orders:      1) Gather wound care supplies and arrange on clean table. 2) Wash your hands with soap and water or use alcohol based hand  for 20 seconds (sing \"Happy Birthday\" twice). 3) Cleanse wounds with normal saline or wound cleanser and gauze. Pat dry with clean gauze. 4) Left leg, right leg- Continue Lac- Hydrin daily to legs. Tubigrip to legs daily on in the morning off at night. Patient needs to wear compression every day.      Keep all dressings clean & dry.      Follow up visit: As needed. Call with any concerns or symptoms infection.      Keep next scheduled appointment. Please give 24 hour notice if unable to keep appointment. [phone]     If you experience any of the following, please call the Wound Care Service during business hours: Monday through Friday 8:00 am - 4:30 pm  ([phone]).              *Increase in pain              *Temperature over 101              *Increase in drainage from your wound or a foul odor              *Uncontrolled swelling              *Need for compression bandage changes due to slippage, breakthrough drainage     If you need medical attention outside of business hours, please contact your Primary Care Doctor or go to the nearest emergency room.          Electronically signed by DHEERAJ Guzman CNP on 2/22/2023 at 4:30 PM

## 2023-02-22 NOTE — DISCHARGE INSTRUCTIONS
Discharge Instructions      Visit Discharge/Physician Orders:  - Okay to use lac hydrin in the evening after you remove patients compression.   - Finish Omnicef antibiotic as prescribed. - Elevate legs throughout the day to help with swelling , also while patient is in her recliner.   - Tubigrip size E used today     Home Care: [facility]      Wound Location: bilateral legs     Dressing orders:      1) Gather wound care supplies and arrange on clean table. 2) Wash your hands with soap and water or use alcohol based hand  for 20 seconds (sing \"Happy Birthday\" twice). 3) Cleanse wounds with normal saline or wound cleanser and gauze. Pat dry with clean gauze. 4) Left leg, right leg- Continue Lac- Hydrin daily to legs. Tubigrip to legs daily on in the morning off at night. Patient needs to wear compression every day. Keep all dressings clean & dry. Follow up visit: As needed. Call with any concerns or symptoms infection. Keep next scheduled appointment. Please give 24 hour notice if unable to keep appointment. [phone]     If you experience any of the following, please call the Wound Care Service during business hours: Monday through Friday 8:00 am - 4:30 pm  ([phone]). *Increase in pain              *Temperature over 101              *Increase in drainage from your wound or a foul odor              *Uncontrolled swelling              *Need for compression bandage changes due to slippage, breakthrough drainage     If you need medical attention outside of business hours, please contact your Primary Care Doctor or go to the nearest emergency room.

## 2023-03-06 ENCOUNTER — TELEPHONE (OUTPATIENT)
Dept: FAMILY MEDICINE CLINIC | Age: 88
End: 2023-03-06

## 2023-03-06 NOTE — TELEPHONE ENCOUNTER
Daughter states pt has been on abx for quite awhile for cellulitis and needs a probiotic ordered    [facility] requires it to be on her med list to be given  [facility]

## 2023-03-07 RX ORDER — L. ACIDOPHILUS/L.BULGARICUS 1MM CELL
1 TABLET ORAL 2 TIMES DAILY
Qty: 60 TABLET | Refills: 11 | Status: SHIPPED | OUTPATIENT
Start: 2023-03-07

## 2023-04-04 ENCOUNTER — TELEPHONE (OUTPATIENT)
Dept: FAMILY MEDICINE CLINIC | Age: 88
End: 2023-04-04

## 2023-04-04 DIAGNOSIS — F03.90 DEMENTIA WITHOUT BEHAVIORAL DISTURBANCE (HCC): Primary | ICD-10-CM

## 2023-04-04 DIAGNOSIS — G30.1 LATE ONSET ALZHEIMER'S DISEASE WITHOUT BEHAVIORAL DISTURBANCE (HCC): Chronic | ICD-10-CM

## 2023-04-04 DIAGNOSIS — F02.80 LATE ONSET ALZHEIMER'S DISEASE WITHOUT BEHAVIORAL DISTURBANCE (HCC): Chronic | ICD-10-CM

## 2023-04-04 DIAGNOSIS — M16.0 OSTEOARTHRITIS OF BOTH HIPS, UNSPECIFIED OSTEOARTHRITIS TYPE: ICD-10-CM

## 2023-04-04 NOTE — TELEPHONE ENCOUNTER
Daughter requesting an order for a Rollator Walker with Seat be faxed over to [address] [place] [address]

## 2023-08-10 ENCOUNTER — OFFICE VISIT (OUTPATIENT)
Dept: FAMILY MEDICINE CLINIC | Age: 88
End: 2023-08-10
Payer: MEDICARE

## 2023-08-10 VITALS
HEART RATE: 64 BPM | WEIGHT: 150 LBS | RESPIRATION RATE: 14 BRPM | SYSTOLIC BLOOD PRESSURE: 106 MMHG | DIASTOLIC BLOOD PRESSURE: 60 MMHG | TEMPERATURE: 98.6 F | OXYGEN SATURATION: 97 % | BODY MASS INDEX: 29.45 KG/M2 | HEIGHT: 60 IN

## 2023-08-10 DIAGNOSIS — G47.09 OTHER INSOMNIA: ICD-10-CM

## 2023-08-10 DIAGNOSIS — Z78.0 POST-MENOPAUSAL: Primary | ICD-10-CM

## 2023-08-10 DIAGNOSIS — R19.5 DARK STOOLS: ICD-10-CM

## 2023-08-10 DIAGNOSIS — F03.90 DEMENTIA WITHOUT BEHAVIORAL DISTURBANCE (HCC): ICD-10-CM

## 2023-08-10 DIAGNOSIS — M25.561 ANTERIOR KNEE PAIN, RIGHT: ICD-10-CM

## 2023-08-10 DIAGNOSIS — I87.2 CHRONIC VENOUS STASIS DERMATITIS OF BOTH LOWER EXTREMITIES: ICD-10-CM

## 2023-08-10 PROBLEM — M19.011 ARTHRITIS OF RIGHT SHOULDER REGION: Status: ACTIVE | Noted: 2023-08-10

## 2023-08-10 PROCEDURE — 99214 OFFICE O/P EST MOD 30 MIN: CPT | Performed by: NURSE PRACTITIONER

## 2023-08-10 PROCEDURE — 1090F PRES/ABSN URINE INCON ASSESS: CPT | Performed by: NURSE PRACTITIONER

## 2023-08-10 PROCEDURE — 1123F ACP DISCUSS/DSCN MKR DOCD: CPT | Performed by: NURSE PRACTITIONER

## 2023-08-10 PROCEDURE — G8417 CALC BMI ABV UP PARAM F/U: HCPCS | Performed by: NURSE PRACTITIONER

## 2023-08-10 PROCEDURE — 1036F TOBACCO NON-USER: CPT | Performed by: NURSE PRACTITIONER

## 2023-08-10 PROCEDURE — G8427 DOCREV CUR MEDS BY ELIG CLIN: HCPCS | Performed by: NURSE PRACTITIONER

## 2023-08-10 RX ORDER — TRAMADOL HYDROCHLORIDE 50 MG/1
25 TABLET ORAL 2 TIMES DAILY
Qty: 30 TABLET | Refills: 3 | Status: SHIPPED | OUTPATIENT
Start: 2023-08-10 | End: 2023-12-08

## 2023-08-10 RX ORDER — TRAZODONE HYDROCHLORIDE 50 MG/1
25 TABLET ORAL NIGHTLY PRN
Qty: 15 TABLET | Refills: 11 | Status: SHIPPED | OUTPATIENT
Start: 2023-08-10

## 2023-08-10 RX ORDER — TRAMADOL HYDROCHLORIDE 50 MG/1
25 TABLET ORAL 2 TIMES DAILY
Qty: 30 TABLET | Refills: 3 | Status: CANCELLED | OUTPATIENT
Start: 2023-08-10 | End: 2023-12-08

## 2023-08-10 ASSESSMENT — ENCOUNTER SYMPTOMS
COUGH: 0
CHEST TIGHTNESS: 0
SHORTNESS OF BREATH: 0

## 2023-08-10 NOTE — PROGRESS NOTES
Jacky Mcqueen is a 80 y.o. female thatpresents for Shoulder Pain and Knee Pain      History obtained today from Patient and Daughter. HPI    Right knee and Shoulder pain    Had recent steroid injection on shoulder yesterday. Not providing much relief. Has seen ortho in the past for knee pain, not much they can do per daughter. Taking Tylenol for the pain, daughter asking for something stronger for pain  No recent falls  Dementia    On Aricept 5 mg nightly since 2019. Memory getting worse, especially short term per daughter  Kallie Regalado if medication can be stopped or increased. Per Dr Bere Godinez note in past, Aricept may have been causing insomnia  Noted to be on Trazodone in the past for insomnia but per patient's daughter she is not taking it currently at assisted living. Pt can't answer if she is sleeping ok or not. Venous stasis  Treated for cellulitis this pas winter  Seen by [facility]  Daughter asking about compression stockings that aren't as tight, she is wearing them in am and taking off at night. Patient told daughter that stools were darker than normal. No blood in stools  No change in appetite    I have reviewed the patient's past medical history, past surgical history, allergies,medications, social and family history and I have made updates where appropriate.     Past Medical History:   Diagnosis Date    Arthritis     Cancer ([address])     skin    Cellulitis     Colitis     Colon polyps     Fatty liver     resolved per patient    Hearing loss of both ears     Hyperlipidemia     Varicose vein of leg     Varicosity        Social History     Tobacco Use    Smoking status: Never     Passive exposure: Never    Smokeless tobacco: Never   Vaping Use    Vaping Use: Never used   Substance Use Topics    Alcohol use: Yes     Comment: rare    Drug use: No       Family History   Problem Relation Age of Onset    Arthritis Mother     Arthritis Father     Cancer Father         lung    Stroke Father     Diabetes

## 2023-08-31 ENCOUNTER — HOSPITAL ENCOUNTER (OUTPATIENT)
Dept: WOMENS IMAGING | Age: 88
Discharge: HOME | End: 2023-08-31
Payer: MEDICARE

## 2023-08-31 DIAGNOSIS — Z78.0 POST-MENOPAUSAL: ICD-10-CM

## 2023-08-31 PROCEDURE — 77080 DXA BONE DENSITY AXIAL: CPT

## 2023-09-05 ENCOUNTER — TELEPHONE (OUTPATIENT)
Dept: FAMILY MEDICINE CLINIC | Age: 88
End: 2023-09-05

## 2023-09-05 RX ORDER — CALCIUM CARBONATE/VITAMIN D3 600 MG-10
2 TABLET ORAL DAILY
Qty: 60 TABLET | Refills: 11 | Status: SHIPPED | OUTPATIENT
Start: 2023-09-05 | End: 2023-09-07

## 2023-09-05 NOTE — TELEPHONE ENCOUNTER
They will need a script and faxed over to [facility] for patient. Patient informed and verbalized understanding.

## 2023-09-05 NOTE — TELEPHONE ENCOUNTER
----- Message from DHEERAJ Griffiths CNP sent at 9/2/2023  2:46 PM EDT -----  DEXA scan shows osteopenia  Medium risk for fractures  Can start calcium and vitamin d supplement if not already on one.

## 2023-09-07 RX ORDER — CALCIUM CARBONATE/VITAMIN D3 600 MG-10
2 TABLET ORAL DAILY
Qty: 60 TABLET | Refills: 11 | Status: SHIPPED | OUTPATIENT
Start: 2023-09-07

## 2023-11-14 ENCOUNTER — TELEPHONE (OUTPATIENT)
Dept: FAMILY MEDICINE CLINIC | Age: 88
End: 2023-11-14

## 2023-11-14 NOTE — TELEPHONE ENCOUNTER
Notified that pt was + covid  Let assisted living know to   Monitor symptoms     Will send in Paxlovid and she needs to hold TRAZODONE while on Paxlovid

## 2023-11-15 ENCOUNTER — APPOINTMENT (OUTPATIENT)
Dept: GENERAL RADIOLOGY | Age: 88
DRG: 564 | End: 2023-11-15
Payer: MEDICARE

## 2023-11-15 ENCOUNTER — HOSPITAL ENCOUNTER (INPATIENT)
Age: 88
LOS: 1 days | Discharge: OTHER INSTITUTION | DRG: 564 | End: 2023-11-17
Attending: EMERGENCY MEDICINE
Payer: MEDICARE

## 2023-11-15 ENCOUNTER — APPOINTMENT (OUTPATIENT)
Dept: CT IMAGING | Age: 88
DRG: 564 | End: 2023-11-15
Payer: MEDICARE

## 2023-11-15 DIAGNOSIS — M62.82 NON-TRAUMATIC RHABDOMYOLYSIS: Primary | ICD-10-CM

## 2023-11-15 PROBLEM — R29.6 FREQUENT FALLS: Status: ACTIVE | Noted: 2023-11-15

## 2023-11-15 LAB
ALBUMIN SERPL BCG-MCNC: 3.8 G/DL (ref 3.5–5.1)
ALP SERPL-CCNC: 128 U/L (ref 38–126)
ALT SERPL W/O P-5'-P-CCNC: 33 U/L (ref 11–66)
ANION GAP SERPL CALC-SCNC: 15 MEQ/L (ref 8–16)
AST SERPL-CCNC: 42 U/L (ref 5–40)
BASOPHILS ABSOLUTE: 0 THOU/MM3 (ref 0–0.1)
BASOPHILS NFR BLD AUTO: 0.4 %
BILIRUB SERPL-MCNC: 0.3 MG/DL (ref 0.3–1.2)
BUN SERPL-MCNC: 27 MG/DL (ref 7–22)
CALCIUM SERPL-MCNC: 9.6 MG/DL (ref 8.5–10.5)
CHLORIDE SERPL-SCNC: 100 MEQ/L (ref 98–111)
CK SERPL-CCNC: 958 U/L (ref 30–135)
CO2 SERPL-SCNC: 23 MEQ/L (ref 23–33)
CREAT SERPL-MCNC: 1.1 MG/DL (ref 0.4–1.2)
DEPRECATED RDW RBC AUTO: 53.5 FL (ref 35–45)
EOSINOPHIL NFR BLD AUTO: 0 %
EOSINOPHILS ABSOLUTE: 0 THOU/MM3 (ref 0–0.4)
ERYTHROCYTE [DISTWIDTH] IN BLOOD BY AUTOMATED COUNT: 15 % (ref 11.5–14.5)
FLUAV RNA RESP QL NAA+PROBE: NOT DETECTED
FLUBV RNA RESP QL NAA+PROBE: NOT DETECTED
GFR SERPL CREATININE-BSD FRML MDRD: 47 ML/MIN/1.73M2
GLUCOSE SERPL-MCNC: 109 MG/DL (ref 70–108)
HCT VFR BLD AUTO: 38 % (ref 37–47)
HGB BLD-MCNC: 12.3 GM/DL (ref 12–16)
IMM GRANULOCYTES # BLD AUTO: 0.02 THOU/MM3 (ref 0–0.07)
IMM GRANULOCYTES NFR BLD AUTO: 0.3 %
LYMPHOCYTES ABSOLUTE: 1.1 THOU/MM3 (ref 1–4.8)
LYMPHOCYTES NFR BLD AUTO: 15.8 %
MCH RBC QN AUTO: 31.2 PG (ref 26–33)
MCHC RBC AUTO-ENTMCNC: 32.4 GM/DL (ref 32.2–35.5)
MCV RBC AUTO: 96.4 FL (ref 81–99)
MONOCYTES ABSOLUTE: 1.4 THOU/MM3 (ref 0.4–1.3)
MONOCYTES NFR BLD AUTO: 20.4 %
NEUTROPHILS NFR BLD AUTO: 63.1 %
NRBC BLD AUTO-RTO: 0 /100 WBC
OSMOLALITY SERPL CALC.SUM OF ELEC: 281.4 MOSMOL/KG (ref 275–300)
PLATELET # BLD AUTO: 235 THOU/MM3 (ref 130–400)
PMV BLD AUTO: 10.2 FL (ref 9.4–12.4)
POTASSIUM SERPL-SCNC: 4 MEQ/L (ref 3.5–5.2)
PROT SERPL-MCNC: 6.9 G/DL (ref 6.1–8)
RBC # BLD AUTO: 3.94 MILL/MM3 (ref 4.2–5.4)
SARS-COV-2 RNA RESP QL NAA+PROBE: DETECTED
SEGMENTED NEUTROPHILS ABSOLUTE COUNT: 4.3 THOU/MM3 (ref 1.8–7.7)
SODIUM SERPL-SCNC: 138 MEQ/L (ref 135–145)
WBC # BLD AUTO: 6.8 THOU/MM3 (ref 4.8–10.8)

## 2023-11-15 PROCEDURE — 99223 1ST HOSP IP/OBS HIGH 75: CPT

## 2023-11-15 PROCEDURE — 73562 X-RAY EXAM OF KNEE 3: CPT

## 2023-11-15 PROCEDURE — 6370000000 HC RX 637 (ALT 250 FOR IP)

## 2023-11-15 PROCEDURE — 82550 ASSAY OF CK (CPK): CPT

## 2023-11-15 PROCEDURE — 73552 X-RAY EXAM OF FEMUR 2/>: CPT

## 2023-11-15 PROCEDURE — 96372 THER/PROPH/DIAG INJ SC/IM: CPT

## 2023-11-15 PROCEDURE — G0378 HOSPITAL OBSERVATION PER HR: HCPCS

## 2023-11-15 PROCEDURE — 73590 X-RAY EXAM OF LOWER LEG: CPT

## 2023-11-15 PROCEDURE — 93010 ELECTROCARDIOGRAM REPORT: CPT | Performed by: NUCLEAR MEDICINE

## 2023-11-15 PROCEDURE — 36415 COLL VENOUS BLD VENIPUNCTURE: CPT

## 2023-11-15 PROCEDURE — XW0DXF5 INTRODUCTION OF OTHER NEW TECHNOLOGY THERAPEUTIC SUBSTANCE INTO MOUTH AND PHARYNX, EXTERNAL APPROACH, NEW TECHNOLOGY GROUP 5: ICD-10-PCS | Performed by: HOSPITALIST

## 2023-11-15 PROCEDURE — 2580000003 HC RX 258

## 2023-11-15 PROCEDURE — 96361 HYDRATE IV INFUSION ADD-ON: CPT

## 2023-11-15 PROCEDURE — 6360000002 HC RX W HCPCS

## 2023-11-15 PROCEDURE — 80053 COMPREHEN METABOLIC PANEL: CPT

## 2023-11-15 PROCEDURE — 93005 ELECTROCARDIOGRAM TRACING: CPT | Performed by: EMERGENCY MEDICINE

## 2023-11-15 PROCEDURE — 73630 X-RAY EXAM OF FOOT: CPT

## 2023-11-15 PROCEDURE — 99285 EMERGENCY DEPT VISIT HI MDM: CPT

## 2023-11-15 PROCEDURE — 87636 SARSCOV2 & INF A&B AMP PRB: CPT

## 2023-11-15 PROCEDURE — 70450 CT HEAD/BRAIN W/O DYE: CPT

## 2023-11-15 PROCEDURE — 85025 COMPLETE CBC W/AUTO DIFF WBC: CPT

## 2023-11-15 PROCEDURE — 73523 X-RAY EXAM HIPS BI 5/> VIEWS: CPT

## 2023-11-15 RX ORDER — SODIUM CHLORIDE 0.9 % (FLUSH) 0.9 %
10 SYRINGE (ML) INJECTION PRN
Status: DISCONTINUED | OUTPATIENT
Start: 2023-11-15 | End: 2023-11-17 | Stop reason: HOSPADM

## 2023-11-15 RX ORDER — ONDANSETRON 2 MG/ML
4 INJECTION INTRAMUSCULAR; INTRAVENOUS EVERY 6 HOURS PRN
Status: DISCONTINUED | OUTPATIENT
Start: 2023-11-15 | End: 2023-11-17 | Stop reason: HOSPADM

## 2023-11-15 RX ORDER — POLYETHYLENE GLYCOL 3350 17 G/17G
17 POWDER, FOR SOLUTION ORAL DAILY PRN
Status: DISCONTINUED | OUTPATIENT
Start: 2023-11-15 | End: 2023-11-17 | Stop reason: HOSPADM

## 2023-11-15 RX ORDER — ACETAMINOPHEN 650 MG/1
650 SUPPOSITORY RECTAL EVERY 6 HOURS PRN
Status: DISCONTINUED | OUTPATIENT
Start: 2023-11-15 | End: 2023-11-17 | Stop reason: HOSPADM

## 2023-11-15 RX ORDER — TRAMADOL HYDROCHLORIDE 50 MG/1
25 TABLET ORAL 2 TIMES DAILY
Status: DISCONTINUED | OUTPATIENT
Start: 2023-11-15 | End: 2023-11-17 | Stop reason: HOSPADM

## 2023-11-15 RX ORDER — SODIUM CHLORIDE, SODIUM LACTATE, POTASSIUM CHLORIDE, AND CALCIUM CHLORIDE .6; .31; .03; .02 G/100ML; G/100ML; G/100ML; G/100ML
500 INJECTION, SOLUTION INTRAVENOUS ONCE
Status: COMPLETED | OUTPATIENT
Start: 2023-11-15 | End: 2023-11-15

## 2023-11-15 RX ORDER — CALCIUM POLYCARBOPHIL 625 MG 625 MG/1
625 TABLET ORAL DAILY
Status: DISCONTINUED | OUTPATIENT
Start: 2023-11-15 | End: 2023-11-17 | Stop reason: HOSPADM

## 2023-11-15 RX ORDER — MULTIVITAMIN WITH IRON
1 TABLET ORAL DAILY
Status: DISCONTINUED | OUTPATIENT
Start: 2023-11-15 | End: 2023-11-17 | Stop reason: HOSPADM

## 2023-11-15 RX ORDER — ENOXAPARIN SODIUM 100 MG/ML
40 INJECTION SUBCUTANEOUS EVERY 24 HOURS
Status: DISCONTINUED | OUTPATIENT
Start: 2023-11-15 | End: 2023-11-17 | Stop reason: HOSPADM

## 2023-11-15 RX ORDER — SODIUM CHLORIDE 0.9 % (FLUSH) 0.9 %
10 SYRINGE (ML) INJECTION EVERY 12 HOURS SCHEDULED
Status: DISCONTINUED | OUTPATIENT
Start: 2023-11-15 | End: 2023-11-17 | Stop reason: HOSPADM

## 2023-11-15 RX ORDER — ACETAMINOPHEN 325 MG/1
650 TABLET ORAL EVERY 6 HOURS PRN
Status: DISCONTINUED | OUTPATIENT
Start: 2023-11-15 | End: 2023-11-17 | Stop reason: HOSPADM

## 2023-11-15 RX ORDER — SODIUM CHLORIDE 9 MG/ML
INJECTION, SOLUTION INTRAVENOUS PRN
Status: DISCONTINUED | OUTPATIENT
Start: 2023-11-15 | End: 2023-11-17 | Stop reason: HOSPADM

## 2023-11-15 RX ORDER — DONEPEZIL HYDROCHLORIDE 10 MG/1
5 TABLET, FILM COATED ORAL NIGHTLY
Status: DISCONTINUED | OUTPATIENT
Start: 2023-11-15 | End: 2023-11-17 | Stop reason: HOSPADM

## 2023-11-15 RX ORDER — ONDANSETRON 4 MG/1
4 TABLET, ORALLY DISINTEGRATING ORAL EVERY 8 HOURS PRN
Status: DISCONTINUED | OUTPATIENT
Start: 2023-11-15 | End: 2023-11-17 | Stop reason: HOSPADM

## 2023-11-15 RX ORDER — TROSPIUM CHLORIDE 20 MG/1
20 TABLET, FILM COATED ORAL NIGHTLY
Status: DISCONTINUED | OUTPATIENT
Start: 2023-11-15 | End: 2023-11-17 | Stop reason: HOSPADM

## 2023-11-15 RX ADMIN — DICLOFENAC SODIUM 4 G: 10 GEL TOPICAL at 22:26

## 2023-11-15 RX ADMIN — Medication 2 TABLET: at 18:14

## 2023-11-15 RX ADMIN — TRAMADOL HYDROCHLORIDE 25 MG: 50 TABLET, COATED ORAL at 22:12

## 2023-11-15 RX ADMIN — NIRMATRELVIR AND RITONAVIR 2 TABLET: KIT at 19:31

## 2023-11-15 RX ADMIN — Medication 1 TABLET: at 18:15

## 2023-11-15 RX ADMIN — DONEPEZIL HYDROCHLORIDE 5 MG: 10 TABLET, FILM COATED ORAL at 22:12

## 2023-11-15 RX ADMIN — SODIUM CHLORIDE, POTASSIUM CHLORIDE, SODIUM LACTATE AND CALCIUM CHLORIDE 500 ML: 600; 310; 30; 20 INJECTION, SOLUTION INTRAVENOUS at 18:13

## 2023-11-15 RX ADMIN — ENOXAPARIN SODIUM 40 MG: 100 INJECTION SUBCUTANEOUS at 19:31

## 2023-11-15 RX ADMIN — CALCIUM POLYCARBOPHIL 625 MG: 625 TABLET, FILM COATED ORAL at 19:31

## 2023-11-15 RX ADMIN — TROSPIUM CHLORIDE 20 MG: 20 TABLET, FILM COATED ORAL at 22:12

## 2023-11-15 ASSESSMENT — PAIN - FUNCTIONAL ASSESSMENT
PAIN_FUNCTIONAL_ASSESSMENT: NONE - DENIES PAIN

## 2023-11-15 NOTE — H&P
Hospitalist - History & Physical      Patient: Alta Lanza    Unit/Bed:41/041A  YOB: 1930  MRN: [id number]   Acct: [de-identified]   PCP: DHEERAJ Larkin - CNP    Date of Service: Pt seen/examined on 11/15/23  and Admitted to Observation with expected LOS less than two midnights due to medical therapy. Chief Complaint: Frequent falls    Assessment and Plan:  Elevated CK with mild RUPALI, concern for rhabdomyolysis:   Patient found down 3x at assisted living facility in the last 24 hours. Suspect secondary to acute COVID-19 infection and poor p.o. intake. Falls were unwitnessed.  today. Creatinine 1.1.  UA, urine myoglbin ordered. Start gentle IVF, repeat CK and BMP in the a.m. Telemetry. Frequent falls:   Patient reportedly fell 3 times in the last 24 hours at assisted living. Suspect secondary to COVID-19 infection and poor p.o. intake. No acute fractures noted on imaging today. Will start gentle IV fluids. Obtain orthostatic vitals every shift. Fall precautions. COVID-19 infection:   Symptom onset 11/13/2023, tested positive yesterday 11/14/2023. Patient endorsing upper respiratory symptoms, however afebrile, no acute leukocytosis noted, at baseline on room air today. Started on Paxlovid outpatient. We will continue course upon admission. Dementia:   Noted per chart review and on exam.  Continue donepezil. History Of Present Illness:    Wilfrido Brown is a 28-year-old  female who presents to [facility] ED today for the evaluation of frequent falls. Patient is alert and oriented to self, however has a significant history of dementia for which she is not unable to provide a history at this time. Daughter at bedside, Brielle Finley, reports in the last 24 hours she has fallen 3 times. Patient lives at a assisted living facility, she was recently diagnosed with COVID and started on Paxlovid outpatient but has been falling frequently alone in her room.   Falls are FINDINGS: Total right hip arthroplasty has been performed. No significant periprosthetic abnormality. Moderate to marked degenerative changes of the right knee. Marginal osteophytes are seen. Small right knee joint effusion. The bones are demineralized. Vascular calcifications are seen. Moderate soft tissue edema is seen about the right ankle. No acute fracture or dislocation. 1. No acute bony abnormality **This report has been created using voice recognition software. It may contain minor errors which are inherent in voice recognition technology. ** Final report electronically signed by Dr Maddie Solorio on 11/15/2023 12:39 PM    XR KNEE RIGHT (3 VIEWS)    Result Date: 11/15/2023  PROCEDURE: XR KNEE RIGHT (3 VIEWS), XR FEMUR RIGHT (MIN 2 VIEWS), XR TIBIA FIBULA RIGHT (2 VIEWS) CLINICAL INFORMATION: unwitnessed fall COMPARISON: None TECHNIQUE: 1. 4 views of the right knee. 2. 2 views of the right femur 3. 2 views of the right tibia fibula FINDINGS: Total right hip arthroplasty has been performed. No significant periprosthetic abnormality. Moderate to marked degenerative changes of the right knee. Marginal osteophytes are seen. Small right knee joint effusion. The bones are demineralized. Vascular calcifications are seen. Moderate soft tissue edema is seen about the right ankle. No acute fracture or dislocation. 1. No acute bony abnormality **This report has been created using voice recognition software. It may contain minor errors which are inherent in voice recognition technology. ** Final report electronically signed by Dr Maddie Solorio on 11/15/2023 12:39 PM    XR TIBIA FIBULA RIGHT (2 VIEWS)    Result Date: 11/15/2023  PROCEDURE: XR KNEE RIGHT (3 VIEWS), XR FEMUR RIGHT (MIN 2 VIEWS), XR TIBIA FIBULA RIGHT (2 VIEWS) CLINICAL INFORMATION: unwitnessed fall COMPARISON: None TECHNIQUE: 1. 4 views of the right knee.  2. 2 views of the right femur 3. 2 views of the right tibia fibula FINDINGS: Total right hip

## 2023-11-15 NOTE — ED PROVIDER NOTES
SARS-CoV-2 RNA, RT PCR DETECTED (*)     All other components within normal limits   CBC WITH AUTO DIFFERENTIAL - Abnormal; Notable for the following components:    RBC 3.94 (*)     RDW-CV 15.0 (*)     RDW-SD 53.5 (*)     Monocytes Absolute 1.4 (*)     All other components within normal limits   COMPREHENSIVE METABOLIC PANEL W/ REFLEX TO MG FOR LOW K - Abnormal; Notable for the following components:    Glucose 109 (*)     BUN 27 (*)     AST 42 (*)     Alkaline Phosphatase 128 (*)     All other components within normal limits   GLOMERULAR FILTRATION RATE, ESTIMATED - Abnormal; Notable for the following components:    Est, Glom Filt Rate 47 (*)     All other components within normal limits   CK - Abnormal; Notable for the following components: Total  (*)     All other components within normal limits   ANION GAP   OSMOLALITY     All laboratory results are individually reviewed and interpreted by me. See ED course below for results interpretation if applicable. (Any cultures that may have been sent were not resulted at the time of this patient ED visit)    Radiologic studies results available at the moment of this note (None if blank):  CT HEAD WO CONTRAST   Final Result   1. No acute intracranial abnormality. 2. Mild cerebral and cerebellar volume loss with ex vacuo dilatation of ventricles. 3. Sequela of chronic microvascular changes. **This report has been created using voice recognition software. It may contain minor errors which are inherent in voice recognition technology. **      Final report electronically signed by Dr Quentin Oliveira on 11/15/2023 1:04 PM      XR FOOT RIGHT (MIN 3 VIEWS)   Final Result   1. No acute bony abnormality            **This report has been created using voice recognition software. It may contain minor errors which are inherent in voice recognition technology. **      Final report electronically signed by Dr Quentin Oliveira on 11/15/2023 12:46 PM      XR FOOT were discussed with the patient/family present. Strict return precautions and follow up instructions were discussed with the patient prior to discharge, with which the patient agrees. FINAL DIAGNOSES:  Final diagnoses:   None       Condition: condition: good  Dispo: Admit to med/surg floor  DISPOSITION        This transcription was electronically signed. It was dictated by use of voice recognition software and electronically transcribed. The transcription may contain errors not detected in proofreading.        Simon Eckert MD  Resident  11/15/23 [id number]

## 2023-11-15 NOTE — ED PROVIDER NOTES
[address] [facility]    Pt Name: Johnathan Cuellar  MRN: [id number]  [address] [facility] 5/25/1930  Date of evaluation: 9/12/20      I personally saw and examined the patient. I have reviewed and agree with the Resident findings, including all diagnostic interpretations and treatment plans as written. I was present for the key portion of any procedures performed and the inclusive time noted in any critical care statement. History:       79-year-old female with underlying history of dementia. Has been having falls, can increasing confusion. Poor historian with all of these falls was concerned for the possibility of rhabdomyolysis at her total CPK is just below 1000 so that is pretty close. Patient is noted to have COVID. CT of the head has come back negative, x-rays of the right foot interpreted by the radiologist as negative, left foot interpreted by the radiologist as negative, right tib-fib interpreted by the radiologist as negative, left tib-fib interpreted by radiologist as negative, right knee and left knee both interpreted by the radiologist as negative, no evidence of any hip fracture.     Admitted to the hospitalist service    Diagnosis: Rhabdomyolysis, multiple falls  admitted           [facility], [address] [facility]  11/15/23 1611

## 2023-11-15 NOTE — ED NOTES
In for hourly rounding. Pt resting on cot in position of comfort. Pt remains A&O at baseline, resps easy and unlabored. IV shows no s/s of infection or infiltration. Pt continues to deny pain at this time. Monitor remains in place. Dr Delbert Huerta at bedside to update pt and family at bedside on POC. Will monitor.      Lenetta Bamberger, RN  11/15/23 1400
In for hourly rounding. Pt resting on cot in position of comfort. Pt remains A&O at baseline, resps easy and unlabored. IV shows no s/s of infection or infiltration. Pt continues to deny pain at this time. Monitor remains in place. Updated pt and family at bedside on POC. Pt provided with warm blankets at this time. Will monitor.      Luigi Adamson RN  11/15/23 2362
Pt presents to ED via [facility] for c/o positive covid with falls x3 in 24 hours. It is reported that pt has had falls x3 in 24 hours, denies hitting head, not on blood thinners. Upon initial assessment, pt has history of Alzheimer's disease, alert to name and  only. EMS reports ECF stating finding pt on the floor this morning, when attempting to  pt, pt yelled out in pain in the R knee. Family at bedside reports pt has chronic R knee issues, causing lower leg to be \"bowed\" outward from knee down. Pt resps easy and unlabored. EKG completed upon arrival. IV established with blood drawn and sent to lab. Family requesting repeat Covid test completed. Pt swabbed for Covid/flu and sent to lab. During assessment, pt denies any pain at this time. Monitor applied, VS as noted. Protocol orders placed. Awaiting provider assessment and further orders. Will monitor.      Yamilex Mack RN  11/15/23 3188
Pt transported to 6K18 by cart in stable condition. Called 6K and informed Malia that the patient was on their way to the unit.       Pieter Davis, JAYMEN  22/75/63 7824
Risk of Suicide: No Risk    Sepsis Screening:  Sepsis Risk Score: 0.62    Palmer Fall Risk:  Palmer 1 Fall Risk  Presents to emergency department  because of falls (Syncope, seizure, or loss of consciousness): Yes  Age > 79: Yes  Altered Mental Status, Intoxication with alcohol or substance confusion (Disorientation, impaired judgment, poor safety awaremess, or inability to follow instructions): No  Impaired Mobility: Ambulates or transfers with assistive devices or assistance;  Unable to ambulate or transer.: Yes  Nursing Judgement: Yes    Swallow Screening        Preferred Language:   English      ALLERGIES     Sulfa antibiotics    SURGICAL HISTORY       Past Surgical History:   Procedure Laterality Date    BLADDER SUSPENSION  1968    FOOT SURGERY Right 1970s    HYSTERECTOMY (CERVIX STATUS UNKNOWN)  1968    JOINT REPLACEMENT Left     left knee    JOINT REPLACEMENT Right     hip    MOHS SURGERY  1/22/14    NOSE    CO OFFICE/OUTPT VISIT,PROCEDURE ONLY N/A 9/19/2018    MOHS DEFECT REPAIR BCC DORSUM OF NOSE performed by Sylwia Smyth MD at [address]  2008    small blockage-adhesion       PAST MEDICAL HISTORY       Past Medical History:   Diagnosis Date    Arthritis     Cancer ([address])     skin    Cellulitis     Colitis     Colon polyps     Fatty liver     resolved per patient    Hearing loss of both ears     Hyperlipidemia     Varicose vein of leg     Varicosity            Electronically signed by Karel Carter RN on 11/15/2023 at 3:35 PM       Karel Carter RN  11/15/23 6096

## 2023-11-15 NOTE — TELEPHONE ENCOUNTER
Patient is currently admitted in house do you still want us to call the assistant [facility] facility

## 2023-11-15 NOTE — TELEPHONE ENCOUNTER
Constance Hairston nurse, from [facility] called and asked for  the script  for paxlovid to be sent to [facility] in [place], if patient still needs to get it due to her being currently admitted and they don't know when she'll get out

## 2023-11-15 NOTE — FLOWSHEET NOTE
11/15/23 1701   Safe Environment   Safety Measures Call light within reach; Family at bedside;Nurse at bedside;Side rails X 2;Standard Safety Measures  (VN admission)     This VN completed admission questions with assistance of daughter nayan at bedside. RN at bedside. Pending home med list that [facility] will fax over.

## 2023-11-16 ENCOUNTER — TELEPHONE (OUTPATIENT)
Dept: FAMILY MEDICINE CLINIC | Age: 88
End: 2023-11-16

## 2023-11-16 LAB
ANION GAP SERPL CALC-SCNC: 13 MEQ/L (ref 8–16)
BACTERIA: ABNORMAL
BASOPHILS ABSOLUTE: 0 THOU/MM3 (ref 0–0.1)
BASOPHILS NFR BLD AUTO: 1 %
BILIRUB UR QL STRIP: NEGATIVE
BUN SERPL-MCNC: 22 MG/DL (ref 7–22)
CALCIUM SERPL-MCNC: 9.1 MG/DL (ref 8.5–10.5)
CASTS #/AREA URNS LPF: ABNORMAL /LPF
CASTS #/AREA URNS LPF: ABNORMAL /LPF
CHARACTER UR: ABNORMAL
CHARCOAL URNS QL MICRO: ABNORMAL
CHLORIDE SERPL-SCNC: 102 MEQ/L (ref 98–111)
CK SERPL-CCNC: 692 U/L (ref 30–135)
CO2 SERPL-SCNC: 22 MEQ/L (ref 23–33)
COLOR UR: YELLOW
CREAT SERPL-MCNC: 0.6 MG/DL (ref 0.4–1.2)
CRYSTALS URNS QL MICRO: ABNORMAL
DEPRECATED RDW RBC AUTO: 54.3 FL (ref 35–45)
EOSINOPHIL NFR BLD AUTO: 2.1 %
EOSINOPHILS ABSOLUTE: 0.1 THOU/MM3 (ref 0–0.4)
EPITHELIAL CELLS, UA: ABNORMAL /HPF
ERYTHROCYTE [DISTWIDTH] IN BLOOD BY AUTOMATED COUNT: 14.9 % (ref 11.5–14.5)
GFR SERPL CREATININE-BSD FRML MDRD: > 60 ML/MIN/1.73M2
GLUCOSE SERPL-MCNC: 90 MG/DL (ref 70–108)
GLUCOSE UR QL STRIP.AUTO: NEGATIVE MG/DL
HCT VFR BLD AUTO: 33.8 % (ref 37–47)
HGB BLD-MCNC: 10.8 GM/DL (ref 12–16)
HGB UR QL STRIP.AUTO: ABNORMAL
IMM GRANULOCYTES # BLD AUTO: 0.01 THOU/MM3 (ref 0–0.07)
IMM GRANULOCYTES NFR BLD AUTO: 0.2 %
KETONES UR QL STRIP.AUTO: ABNORMAL
LEUKOCYTE ESTERASE UR QL STRIP.AUTO: ABNORMAL
LYMPHOCYTES ABSOLUTE: 1.5 THOU/MM3 (ref 1–4.8)
LYMPHOCYTES NFR BLD AUTO: 29.9 %
MCH RBC QN AUTO: 31.6 PG (ref 26–33)
MCHC RBC AUTO-ENTMCNC: 32 GM/DL (ref 32.2–35.5)
MCV RBC AUTO: 98.8 FL (ref 81–99)
MONOCYTES ABSOLUTE: 0.9 THOU/MM3 (ref 0.4–1.3)
MONOCYTES NFR BLD AUTO: 18.1 %
NEUTROPHILS NFR BLD AUTO: 48.7 %
NITRITE UR QL STRIP.AUTO: POSITIVE
NRBC BLD AUTO-RTO: 0 /100 WBC
PH UR STRIP.AUTO: 6 [PH] (ref 5–9)
PLATELET # BLD AUTO: 195 THOU/MM3 (ref 130–400)
PMV BLD AUTO: 10.3 FL (ref 9.4–12.4)
POTASSIUM SERPL-SCNC: 3.6 MEQ/L (ref 3.5–5.2)
PROT UR STRIP.AUTO-MCNC: ABNORMAL MG/DL
RBC # BLD AUTO: 3.42 MILL/MM3 (ref 4.2–5.4)
RBC #/AREA URNS HPF: ABNORMAL /HPF
RENAL EPI CELLS #/AREA URNS HPF: ABNORMAL /[HPF]
SEGMENTED NEUTROPHILS ABSOLUTE COUNT: 2.4 THOU/MM3 (ref 1.8–7.7)
SODIUM SERPL-SCNC: 137 MEQ/L (ref 135–145)
SPECIFIC GRAVITY UA: 1.01 (ref 1–1.03)
UROBILINOGEN, URINE: 0.2 EU/DL (ref 0–1)
WBC # BLD AUTO: 4.9 THOU/MM3 (ref 4.8–10.8)
WBC #/AREA URNS HPF: > 100 /HPF
YEAST LIKE FUNGI URNS QL MICRO: ABNORMAL

## 2023-11-16 PROCEDURE — 80048 BASIC METABOLIC PNL TOTAL CA: CPT

## 2023-11-16 PROCEDURE — 97166 OT EVAL MOD COMPLEX 45 MIN: CPT

## 2023-11-16 PROCEDURE — 2580000003 HC RX 258

## 2023-11-16 PROCEDURE — 82550 ASSAY OF CK (CPK): CPT

## 2023-11-16 PROCEDURE — 6370000000 HC RX 637 (ALT 250 FOR IP)

## 2023-11-16 PROCEDURE — 99233 SBSQ HOSP IP/OBS HIGH 50: CPT | Performed by: PHYSICIAN ASSISTANT

## 2023-11-16 PROCEDURE — 96365 THER/PROPH/DIAG IV INF INIT: CPT

## 2023-11-16 PROCEDURE — 85025 COMPLETE CBC W/AUTO DIFF WBC: CPT

## 2023-11-16 PROCEDURE — 36415 COLL VENOUS BLD VENIPUNCTURE: CPT

## 2023-11-16 PROCEDURE — 97535 SELF CARE MNGMENT TRAINING: CPT

## 2023-11-16 PROCEDURE — 96361 HYDRATE IV INFUSION ADD-ON: CPT

## 2023-11-16 PROCEDURE — G0378 HOSPITAL OBSERVATION PER HR: HCPCS

## 2023-11-16 PROCEDURE — 81001 URINALYSIS AUTO W/SCOPE: CPT

## 2023-11-16 PROCEDURE — 6360000002 HC RX W HCPCS: Performed by: PHYSICIAN ASSISTANT

## 2023-11-16 PROCEDURE — 2580000003 HC RX 258: Performed by: PHYSICIAN ASSISTANT

## 2023-11-16 PROCEDURE — 6360000002 HC RX W HCPCS

## 2023-11-16 PROCEDURE — 96372 THER/PROPH/DIAG INJ SC/IM: CPT

## 2023-11-16 RX ORDER — SODIUM CHLORIDE 9 MG/ML
INJECTION, SOLUTION INTRAVENOUS CONTINUOUS
Status: DISCONTINUED | OUTPATIENT
Start: 2023-11-16 | End: 2023-11-17 | Stop reason: HOSPADM

## 2023-11-16 RX ADMIN — TROSPIUM CHLORIDE 20 MG: 20 TABLET, FILM COATED ORAL at 20:45

## 2023-11-16 RX ADMIN — Medication 1 TABLET: at 09:18

## 2023-11-16 RX ADMIN — Medication 2 TABLET: at 09:18

## 2023-11-16 RX ADMIN — TRAMADOL HYDROCHLORIDE 25 MG: 50 TABLET, COATED ORAL at 20:58

## 2023-11-16 RX ADMIN — TRAMADOL HYDROCHLORIDE 25 MG: 50 TABLET, COATED ORAL at 09:19

## 2023-11-16 RX ADMIN — DICLOFENAC SODIUM 4 G: 10 GEL TOPICAL at 20:45

## 2023-11-16 RX ADMIN — CALCIUM POLYCARBOPHIL 625 MG: 625 TABLET, FILM COATED ORAL at 09:18

## 2023-11-16 RX ADMIN — SODIUM CHLORIDE, PRESERVATIVE FREE 10 ML: 5 INJECTION INTRAVENOUS at 09:20

## 2023-11-16 RX ADMIN — SODIUM CHLORIDE: 9 INJECTION, SOLUTION INTRAVENOUS at 16:31

## 2023-11-16 RX ADMIN — NIRMATRELVIR AND RITONAVIR 2 TABLET: KIT at 20:44

## 2023-11-16 RX ADMIN — CEFTRIAXONE SODIUM 1000 MG: 1 INJECTION, POWDER, FOR SOLUTION INTRAMUSCULAR; INTRAVENOUS at 11:29

## 2023-11-16 RX ADMIN — ENOXAPARIN SODIUM 40 MG: 100 INJECTION SUBCUTANEOUS at 20:45

## 2023-11-16 RX ADMIN — SODIUM CHLORIDE, PRESERVATIVE FREE 10 ML: 5 INJECTION INTRAVENOUS at 20:44

## 2023-11-16 RX ADMIN — DONEPEZIL HYDROCHLORIDE 5 MG: 10 TABLET, FILM COATED ORAL at 20:45

## 2023-11-16 RX ADMIN — DICLOFENAC SODIUM 4 G: 10 GEL TOPICAL at 09:18

## 2023-11-16 RX ADMIN — NIRMATRELVIR AND RITONAVIR 2 TABLET: KIT at 11:23

## 2023-11-16 ASSESSMENT — PAIN DESCRIPTION - ORIENTATION
ORIENTATION: RIGHT;LEFT
ORIENTATION: OTHER (COMMENT)

## 2023-11-16 ASSESSMENT — PAIN DESCRIPTION - LOCATION
LOCATION: KNEE
LOCATION: GENERALIZED

## 2023-11-16 ASSESSMENT — PAIN SCALES - GENERAL
PAINLEVEL_OUTOF10: 6
PAINLEVEL_OUTOF10: 2

## 2023-11-16 ASSESSMENT — PAIN DESCRIPTION - FREQUENCY: FREQUENCY: INTERMITTENT

## 2023-11-16 ASSESSMENT — PAIN - FUNCTIONAL ASSESSMENT
PAIN_FUNCTIONAL_ASSESSMENT: PREVENTS OR INTERFERES SOME ACTIVE ACTIVITIES AND ADLS
PAIN_FUNCTIONAL_ASSESSMENT: ACTIVITIES ARE NOT PREVENTED

## 2023-11-16 ASSESSMENT — PAIN SCALES - WONG BAKER: WONGBAKER_NUMERICALRESPONSE: 2

## 2023-11-16 ASSESSMENT — PAIN DESCRIPTION - PAIN TYPE: TYPE: CHRONIC PAIN

## 2023-11-16 ASSESSMENT — PAIN DESCRIPTION - DESCRIPTORS
DESCRIPTORS: ACHING
DESCRIPTORS: ACHING;DULL

## 2023-11-16 NOTE — FLOWSHEET NOTE
11/16/23 1107   Safe Environment   Safety Measures Call light within reach; Family at bedside  (Virtual Nurse Safety Round Complete)     Call placed to patients room, patient did not respond to audio, video turned on for safety purposes. Family at bedside heard VN and gave permission to turn on camera. Patient is resting in bedside chair, call light within reach, no signs or symtpoms of distress noted.

## 2023-11-16 NOTE — PROGRESS NOTES
Hospitalist Progress Note    Patient:  Jacky Mcqueen      Unit/Bed:6K-18/018-A    YOB: 1930    MRN: [id number]       Acct: [de-identified]     PCP: DHEERAJ Ray CNP    Date of Admission: 11/15/2023    Assessment/Plan:    Elevated CK with mild RUPALI, concern for rhabdomyolysis:              Patient found down 3x at assisted living facility in the last 24 hours. -CK improved on repeat to 692  -Continue IVF  -Urine Myoglobin pending      UTI:  Urinalysis concerning for UTI, culture pending  Rocephin initiated     Frequent falls with elevated CK:              -Patient reportedly fell 3 times in the last 24 hours at assisted living. -PT/OT eval and treat   -Continue IVF hydration      COVID-19 infection:              Symptom onset 11/13/2023, tested positive yesterday 11/14/2023. Patient endorsing upper respiratory symptoms, however afebrile, no acute leukocytosis noted, at baseline on room air today. Started on Paxlovid outpatient. We will continue course upon admission. Dementia:              Noted per chart review and on exam.  Continue donepezil. Chief Complaint: Frequent falls    Subjective/24 hour events: 80 y.o. female admitted to the hospitalist service following frequent falls. Patient denies chest pain. UA with evidence of UTI, rocephin ordered.     Medications:    Infusion Medications    sodium chloride       Scheduled Medications    cefTRIAXone (ROCEPHIN) IV  1,000 mg IntraVENous Q24H    oyster shell calcium w/D  2 tablet Oral Daily    diclofenac sodium  4 g Topical BID    donepezil  5 mg Oral Nightly    trospium  20 mg Oral Nightly    multivitamin  1 tablet Oral Daily    traMADol  25 mg Oral BID    sodium chloride flush  10 mL IntraVENous 2 times per day    enoxaparin  40 mg SubCUTAneous Q24H    nirmatrelvir/ritonavir  2 tablet Oral Q12H    polycarbophil  625 mg Oral Daily     PRN Meds: sodium chloride flush, sodium chloride, ondansetron **OR** ondansetron, signed by Dr Linh Monge on 11/15/2023 1:04 PM      XR FOOT RIGHT (MIN 3 VIEWS)   Final Result   1. No acute bony abnormality            **This report has been created using voice recognition software. It may contain minor errors which are inherent in voice recognition technology. **      Final report electronically signed by Dr Linh Monge on 11/15/2023 12:46 PM      XR FOOT LEFT (MIN 3 VIEWS)   Final Result   1. No acute bony abnormality            **This report has been created using voice recognition software. It may contain minor errors which are inherent in voice recognition technology. **      Final report electronically signed by Dr Linh Monge on 11/15/2023 12:46 PM      XR TIBIA FIBULA RIGHT (2 VIEWS)   Final Result   1. No acute bony abnormality            **This report has been created using voice recognition software. It may contain minor errors which are inherent in voice recognition technology. **      Final report electronically signed by Dr Linh Monge on 11/15/2023 12:39 PM      XR TIBIA FIBULA LEFT (2 VIEWS)   Final Result   1. No acute bony abnormality            **This report has been created using voice recognition software. It may contain minor errors which are inherent in voice recognition technology. **      Final report electronically signed by Dr Linh Monge on 11/15/2023 12:35 PM      XR KNEE RIGHT (3 VIEWS)   Final Result   1. No acute bony abnormality            **This report has been created using voice recognition software. It may contain minor errors which are inherent in voice recognition technology. **      Final report electronically signed by Dr Linh Monge on 11/15/2023 12:39 PM      XR KNEE LEFT (3 VIEWS)   Final Result   1. No acute bony abnormality            **This report has been created using voice recognition software. It may contain minor errors which are inherent in voice recognition technology. **      Final report electronically signed by

## 2023-11-16 NOTE — TELEPHONE ENCOUNTER
----- Message from Aj Luis Armando sent at 11/16/2023  9:33 AM EST -----  Subject: Message to Provider    QUESTIONS  Information for Provider? Patient is wanting to inform Kristin Delgado that she   has been admitted to the hospital.   ---------------------------------------------------------------------------  --------------  [address]  [phone]; OK to leave message on voicemail  ---------------------------------------------------------------------------  --------------  SCRIPT ANSWERS  Relationship to Patient? Covered Entity  Covered Entity Type?  Hospital?  Representative Name? Filipe Schulz

## 2023-11-16 NOTE — PROGRESS NOTES
Virtual nurse safety check completed. Patient lying in bed, eyes closed, no apparent evidence of pain/discomfort noted at this time. Patient did not respond to name/verbal stimuli from this virtual nurse.

## 2023-11-16 NOTE — FLOWSHEET NOTE
11/16/23 6057   Safe Environment   Safety Measures Call light within reach; Side rails X 2;Bed in low position  (Virtual Nurse Safety Round Complete)     Call placed to patients room, patient did not respond to audio, video turned on for safety purposes. Patient is resting in bed, call light within reach, no signs or symtpoms of distress noted.

## 2023-11-16 NOTE — PROGRESS NOTES
[facility] OCCUPATIONAL THERAPY  [facility] RENAL TELEMETRY 6K  EVALUATION    Time:   Time In: 2990  Time Out: 4832  Timed Code Treatment Minutes: 22 Minutes  Minutes: 32          Date: 2023  Patient Name: Uche Sommers,   Gender: female      MRN: [id number]  : 1930  (80 y.o.)  Referring Practitioner: Paulette Meza PA-C  Diagnosis: Frequent falls  Additional Pertinent Hx: Raul Bennett is a 80-year-old  female who presents to [facility] ED today for the evaluation of frequent falls. Patient is alert and oriented to self, however has a significant history of dementia for which she is not unable to provide a history at this time. Daughter at bedside, Minus Player, reports in the last 24 hours she has fallen 3 times. Patient lives at a assisted living facility, she was recently diagnosed with COVID and started on Paxlovid outpatient but has been falling frequently alone in her room. Falls are unwitnessed, unclear if patient has lost consciousness or not. Daughter reports she can tell patient has significant sinus congestion. She states that her symptoms started on Monday. Daughter reports patient has had a history of left knee replacement, and states that she has been needing her right knee replaced as patient has significant pain in her right knee. Minus Player expresses concern for safety to return back to current residence at the assisted living. She questions whether she may need rehab facility for a time or higher level of care indefinitely. Restrictions/Precautions:  Restrictions/Precautions: Fall Risk  Position Activity Restriction  Other position/activity restrictions: dementia    Subjective  Chart Reviewed: Yes, History and Physical, Imaging, Orders, Progress Notes  Patient assessed for rehabilitation services?: Yes  Family / Caregiver Present: No    Subjective: Patient resting in bed, pleasant but confused. alert to self only.     Pain: denied pain/10: no nonverbal signs FUNCTIONAL MOBILITY:  Assistive Device: Walker  Assist Level:  Minimal Assistance, X 1, with set-up, with verbal cues , and with increased time for completion. Distance: To and from bathroom  Assist for walker placement and to keep walker with her at all times. AM-PAC Inpatient Daily Activity Raw Score: 13  AM-PAC Inpatient ADL T-Scale Score : 32.03  ADL Inpatient CMS 0-100% Score: 63.03    Activity Tolerance:  Patient tolerance of  treatment: Fair treatment tolerance, Reduced activity pace, and cognition       Assessment:  Assessment: Patient demonstrates decreased independence and safety in ADLs, would benfit from continued OT services to address above deficits to maximize independence in adls. Performance deficits / Impairments: Decreased functional mobility , Decreased ADL status, Decreased ROM, Decreased strength, Decreased safe awareness, Decreased cognition, Decreased endurance, Decreased high-level IADLs, Decreased coordination  REQUIRES OT FOLLOW-UP: Yes  Decision Making: Medium Complexity    Treatment Initiated: Treatment and education initiated within context of evaluation. Evaluation time included review of current medical information, gathering information related to past medical, social and functional history, completion of standardized testing, formal and informal observation of tasks, assessment of data and development of plan of care and goals. Treatment time included skilled education and facilitation of tasks to increase safety and independence with ADL's for improved functional independence and quality of life.     Discharge Recommendations:  Subacute/Skilled Nursing Facility    Patient Education:     Patient Education  Education Given To: Patient  Education Provided: Role of Therapy, Plan of Care  Education Method: Demonstration, Verbal  Barriers to Learning: Cognition  Education Outcome: Verbalized understanding, Continued education needed    Equipment Recommendations:  Equipment

## 2023-11-16 NOTE — CARE COORDINATION
11/16/23, 2:35 PM EST  Discharge Planning Evaluation  Social work consult received, patient from [facility]. Patient/Family preference is to return to [place]. Would patient be willing to go to a skilled facility if needed: yes- does not meet inpatient criteria for Medicare. Is there skilled care available at current facility: no.  Barriers to return to current living situation: none noted  Spoke with Benjamin Strickland at the facility. Patient bed hold: yes  Anticipated transport plan: daughter [address]: Legal Next of [address] [facility]  SW met with pts daughterMikie. Explained that pt does not qualify for ECF due to observation status under Medicare. RN indicated that pt is being treated for UTI and will discharge tomorrow. Daughter is agreeable to home health. SW spoke with Benjamin Strickland at [facility], they can accept pt back and set up [facility] services, just need [facility] orders sent with pt. Readmission Risk Low 0-14, Mod 15-19), High > 20: Readmission Risk Score: 16.1    Current PCP: Odilia Tejada APRN - CNP  PCP verified by CM? Yes    Patient Orientation: Person    Patient Cognition: Dementia / Early Alzheimer's  History Provided by: Child/Family    Advance Directives:      Code Status: Full Code   Patient's Primary Decision Maker is: Legal Next of Kin    Primary Decision Maker: Salina Ambrose - Child - [phone]     Discharge Planning:    Patient lives with: Other (Comment) Type of Home: Assisted living [facility])  Primary Care Giver:  Other (Comment) ([facility])  Patient Support Systems include: Children, Other (Comment)   Current Financial resources: Medicare  Current community resources: Assisted Living ([facility])  Current services prior to admission: Other (Comment)            Current DME:              Type of Home Care services:  Nursing Services    ADLS  Prior functional level: Assistance with the following:, Bathing, Dressing, Cooking, Housework, Shopping,

## 2023-11-17 VITALS
DIASTOLIC BLOOD PRESSURE: 65 MMHG | BODY MASS INDEX: 29.06 KG/M2 | WEIGHT: 170.19 LBS | HEIGHT: 64 IN | TEMPERATURE: 98 F | RESPIRATION RATE: 18 BRPM | OXYGEN SATURATION: 91 % | SYSTOLIC BLOOD PRESSURE: 103 MMHG | HEART RATE: 91 BPM

## 2023-11-17 PROBLEM — N39.0 UTI (URINARY TRACT INFECTION): Status: ACTIVE | Noted: 2023-11-17

## 2023-11-17 LAB
ANION GAP SERPL CALC-SCNC: 11 MEQ/L (ref 8–16)
BASOPHILS ABSOLUTE: 0 THOU/MM3 (ref 0–0.1)
BASOPHILS NFR BLD AUTO: 0.5 %
BUN SERPL-MCNC: 14 MG/DL (ref 7–22)
CALCIUM SERPL-MCNC: 8.5 MG/DL (ref 8.5–10.5)
CHLORIDE SERPL-SCNC: 101 MEQ/L (ref 98–111)
CK SERPL-CCNC: 391 U/L (ref 30–135)
CO2 SERPL-SCNC: 23 MEQ/L (ref 23–33)
CREAT SERPL-MCNC: 0.5 MG/DL (ref 0.4–1.2)
DEPRECATED RDW RBC AUTO: 53.1 FL (ref 35–45)
EOSINOPHIL NFR BLD AUTO: 2.5 %
EOSINOPHILS ABSOLUTE: 0.2 THOU/MM3 (ref 0–0.4)
ERYTHROCYTE [DISTWIDTH] IN BLOOD BY AUTOMATED COUNT: 14.8 % (ref 11.5–14.5)
GFR SERPL CREATININE-BSD FRML MDRD: > 60 ML/MIN/1.73M2
GLUCOSE SERPL-MCNC: 93 MG/DL (ref 70–108)
HCT VFR BLD AUTO: 34.8 % (ref 37–47)
HGB BLD-MCNC: 11.2 GM/DL (ref 12–16)
HGB UR QL STRIP.AUTO: POSITIVE
IMM GRANULOCYTES # BLD AUTO: 0.02 THOU/MM3 (ref 0–0.07)
IMM GRANULOCYTES NFR BLD AUTO: 0.3 %
LYMPHOCYTES ABSOLUTE: 1.3 THOU/MM3 (ref 1–4.8)
LYMPHOCYTES NFR BLD AUTO: 22.1 %
MAGNESIUM SERPL-MCNC: 1.9 MG/DL (ref 1.6–2.4)
MCH RBC QN AUTO: 31.3 PG (ref 26–33)
MCHC RBC AUTO-ENTMCNC: 32.2 GM/DL (ref 32.2–35.5)
MCV RBC AUTO: 97.2 FL (ref 81–99)
MONOCYTES ABSOLUTE: 1.2 THOU/MM3 (ref 0.4–1.3)
MONOCYTES NFR BLD AUTO: 19.6 %
NEUTROPHILS NFR BLD AUTO: 55 %
NRBC BLD AUTO-RTO: 0 /100 WBC
PLATELET # BLD AUTO: 207 THOU/MM3 (ref 130–400)
PMV BLD AUTO: 9.9 FL (ref 9.4–12.4)
POTASSIUM SERPL-SCNC: 3.4 MEQ/L (ref 3.5–5.2)
RBC # BLD AUTO: 3.58 MILL/MM3 (ref 4.2–5.4)
SEGMENTED NEUTROPHILS ABSOLUTE COUNT: 3.3 THOU/MM3 (ref 1.8–7.7)
SODIUM SERPL-SCNC: 135 MEQ/L (ref 135–145)
WBC # BLD AUTO: 6 THOU/MM3 (ref 4.8–10.8)

## 2023-11-17 PROCEDURE — 83874 ASSAY OF MYOGLOBIN: CPT

## 2023-11-17 PROCEDURE — 6360000002 HC RX W HCPCS

## 2023-11-17 PROCEDURE — 96361 HYDRATE IV INFUSION ADD-ON: CPT

## 2023-11-17 PROCEDURE — 1200000000 HC SEMI PRIVATE

## 2023-11-17 PROCEDURE — 80048 BASIC METABOLIC PNL TOTAL CA: CPT

## 2023-11-17 PROCEDURE — 96375 TX/PRO/DX INJ NEW DRUG ADDON: CPT

## 2023-11-17 PROCEDURE — 83735 ASSAY OF MAGNESIUM: CPT

## 2023-11-17 PROCEDURE — 96374 THER/PROPH/DIAG INJ IV PUSH: CPT

## 2023-11-17 PROCEDURE — 85025 COMPLETE CBC W/AUTO DIFF WBC: CPT

## 2023-11-17 PROCEDURE — 82550 ASSAY OF CK (CPK): CPT

## 2023-11-17 PROCEDURE — 36415 COLL VENOUS BLD VENIPUNCTURE: CPT

## 2023-11-17 PROCEDURE — 6370000000 HC RX 637 (ALT 250 FOR IP)

## 2023-11-17 PROCEDURE — 6360000002 HC RX W HCPCS: Performed by: PHYSICIAN ASSISTANT

## 2023-11-17 PROCEDURE — 2580000003 HC RX 258

## 2023-11-17 PROCEDURE — 2580000003 HC RX 258: Performed by: PHYSICIAN ASSISTANT

## 2023-11-17 PROCEDURE — G0378 HOSPITAL OBSERVATION PER HR: HCPCS

## 2023-11-17 RX ORDER — POTASSIUM CHLORIDE 7.45 MG/ML
10 INJECTION INTRAVENOUS PRN
Status: DISCONTINUED | OUTPATIENT
Start: 2023-11-17 | End: 2023-11-17 | Stop reason: HOSPADM

## 2023-11-17 RX ORDER — CEPHALEXIN 500 MG/1
500 CAPSULE ORAL 4 TIMES DAILY
Qty: 28 CAPSULE | Refills: 0 | Status: SHIPPED | OUTPATIENT
Start: 2023-11-17

## 2023-11-17 RX ORDER — POTASSIUM CHLORIDE 20 MEQ/1
40 TABLET, EXTENDED RELEASE ORAL PRN
Status: DISCONTINUED | OUTPATIENT
Start: 2023-11-17 | End: 2023-11-17 | Stop reason: HOSPADM

## 2023-11-17 RX ORDER — HYDRALAZINE HYDROCHLORIDE 20 MG/ML
5 INJECTION INTRAMUSCULAR; INTRAVENOUS EVERY 6 HOURS PRN
Status: DISCONTINUED | OUTPATIENT
Start: 2023-11-17 | End: 2023-11-17 | Stop reason: HOSPADM

## 2023-11-17 RX ADMIN — TRAMADOL HYDROCHLORIDE 25 MG: 50 TABLET, COATED ORAL at 09:01

## 2023-11-17 RX ADMIN — SODIUM CHLORIDE, PRESERVATIVE FREE 10 ML: 5 INJECTION INTRAVENOUS at 09:02

## 2023-11-17 RX ADMIN — SODIUM CHLORIDE: 9 INJECTION, SOLUTION INTRAVENOUS at 05:09

## 2023-11-17 RX ADMIN — DICLOFENAC SODIUM 4 G: 10 GEL TOPICAL at 11:23

## 2023-11-17 RX ADMIN — Medication 1 TABLET: at 11:24

## 2023-11-17 RX ADMIN — NIRMATRELVIR AND RITONAVIR 2 TABLET: KIT at 05:11

## 2023-11-17 RX ADMIN — Medication 2 TABLET: at 09:02

## 2023-11-17 RX ADMIN — CALCIUM POLYCARBOPHIL 625 MG: 625 TABLET, FILM COATED ORAL at 09:02

## 2023-11-17 RX ADMIN — CEFTRIAXONE SODIUM 1000 MG: 1 INJECTION, POWDER, FOR SOLUTION INTRAMUSCULAR; INTRAVENOUS at 11:21

## 2023-11-17 RX ADMIN — HYDRALAZINE HYDROCHLORIDE 5 MG: 20 INJECTION, SOLUTION INTRAMUSCULAR; INTRAVENOUS at 05:09

## 2023-11-17 RX ADMIN — ACETAMINOPHEN 650 MG: 325 TABLET ORAL at 04:00

## 2023-11-17 ASSESSMENT — PAIN - FUNCTIONAL ASSESSMENT: PAIN_FUNCTIONAL_ASSESSMENT: PREVENTS OR INTERFERES SOME ACTIVE ACTIVITIES AND ADLS

## 2023-11-17 ASSESSMENT — PAIN DESCRIPTION - DESCRIPTORS: DESCRIPTORS: ACHING;DULL

## 2023-11-17 ASSESSMENT — PAIN DESCRIPTION - ORIENTATION: ORIENTATION: RIGHT;LEFT

## 2023-11-17 ASSESSMENT — PAIN DESCRIPTION - FREQUENCY: FREQUENCY: INTERMITTENT

## 2023-11-17 ASSESSMENT — PAIN SCALES - GENERAL
PAINLEVEL_OUTOF10: 5
PAINLEVEL_OUTOF10: 6

## 2023-11-17 ASSESSMENT — PAIN DESCRIPTION - LOCATION
LOCATION: GENERALIZED
LOCATION: KNEE

## 2023-11-17 ASSESSMENT — PAIN DESCRIPTION - PAIN TYPE: TYPE: CHRONIC PAIN

## 2023-11-17 NOTE — CARE COORDINATION
11/17/23, 11:05 AM EST    Patient goals/plan/ treatment preferences discussed by  and . Patient goals/plan/ treatment preferences reviewed with patient/ family. Patient/ family verbalize understanding of discharge plan and are in agreement with goal/plan/treatment preferences. Understanding was demonstrated using the teach back method. AVS provided by RN at time of discharge, which includes all necessary medical information pertaining to the patients current course of illness, treatment, post-discharge goals of care, and treatment preferences. Services At/After Discharge: Assisted Living, Home Health, Nursing service, OT, and PT       IMM Letter  Observation Status Letter date given[de-identified] 11/15/23  Observation Status Letter time given[de-identified] 6080  Observation Status Letter given to Patient/Family/Significant other/Guardian/POA/by[de-identified] pt access     Pt is being discharged today back to [facility]. JERMAINE notified Safia Berg, director at [facility]. JERMAINE faxed AVS.  Daughter, Renetta Yoon is present and will transport pt. Pt will also have new HH order for PT/OT (Assisted Living will set up). EDWINA Brewster is aware.

## 2023-11-17 NOTE — FLOWSHEET NOTE
11/17/23 1046   Safe Environment   Safety Measures Call light within reach;Standard Safety Measures  (virtual safety round completed)     Virtual nurse completed safety round with patient safe in bed and was confused when patient saw VN on screen and did not respond verbally just stared. Unable to complete home medications due to patient's confusion. Thank you.

## 2023-11-17 NOTE — PLAN OF CARE
Problem: Discharge Planning  Goal: Discharge to home or other facility with appropriate resources  Outcome: Adequate for Discharge     Problem: ABCDS Injury Assessment  Goal: Absence of physical injury  Outcome: Adequate for Discharge     Problem: Safety - Adult  Goal: Free from fall injury  Outcome: Adequate for Discharge     Problem: Skin/Tissue Integrity  Goal: Absence of new skin breakdown  Description: 1. Monitor for areas of redness and/or skin breakdown  2. Assess vascular access sites hourly  3. Every 4-6 hours minimum:  Change oxygen saturation probe site  4. Every 4-6 hours:  If on nasal continuous positive airway pressure, respiratory therapy assess nares and determine need for appliance change or resting period. Outcome: Adequate for Discharge     Problem: Confusion  Goal: Confusion, delirium, dementia, or psychosis is improved or at baseline  Description: INTERVENTIONS:  1. Assess for possible contributors to thought disturbance, including medications, impaired vision or hearing, underlying metabolic abnormalities, dehydration, psychiatric diagnoses, and notify attending LIP  2. [place] high risk fall precautions, as indicated  3. Provide frequent short contacts to provide reality reorientation, refocusing and direction  4. Decrease environmental stimuli, including noise as appropriate  5. Monitor and intervene to maintain adequate nutrition, hydration, elimination, sleep and activity  6. If unable to ensure safety without constant attention obtain sitter and review sitter guidelines with assigned personnel  7.  Initiate Psychosocial CNS and Spiritual Care consult, as indicated  Outcome: Adequate for Discharge

## 2023-11-17 NOTE — PROGRESS NOTES
Hospitalist Progress Note    Patient:  Angie Combs      Unit/Bed:6K-18/018-A    YOB: 1930    MRN: [id number]       Acct: [de-identified]     PCP: DHEERAJ Sifuentes CNP    Date of Admission: 11/15/2023    Assessment/Plan:    Elevated CK with RUPALI and Traumatic Rhabdomyolysis:              Patient found down 3x at assisted living facility in the last 24 hours. -CK improved on repeat  -Continue IVF  -Urine Myoglobin positive     UTI:  Urinalysis concerning for UTI, culture pending  Rocephin initiated     Frequent falls with elevated CK:              -Patient reportedly fell 3 times in the last 24 hours at assisted living. -PT/OT eval and treat   -Continue IVF hydration      COVID-19 infection:              Symptom onset 11/13/2023, tested positive yesterday 11/14/2023. Patient endorsing upper respiratory symptoms, however afebrile, no acute leukocytosis noted, at baseline on room air today. Started on Paxlovid outpatient. We will continue course upon admission. Dementia:              Noted per chart review and on exam.  Continue donepezil. Chief Complaint: Frequent falls    Subjective/24 hour events: 80 y.o. female admitted to the hospitalist service following frequent falls. Patient denies chest pain. UA with evidence of UTI, rocephin ordered.     Medications:    Infusion Medications    sodium chloride 75 mL/hr at 11/17/23 0529    sodium chloride       Scheduled Medications    cefTRIAXone (ROCEPHIN) IV  1,000 mg IntraVENous Q24H    oyster shell calcium w/D  2 tablet Oral Daily    diclofenac sodium  4 g Topical BID    donepezil  5 mg Oral Nightly    trospium  20 mg Oral Nightly    multivitamin  1 tablet Oral Daily    traMADol  25 mg Oral BID    sodium chloride flush  10 mL IntraVENous 2 times per day    enoxaparin  40 mg SubCUTAneous Q24H    nirmatrelvir/ritonavir  2 tablet Oral Q12H    polycarbophil  625 mg Oral Daily     PRN Meds: hydrALAZINE, sodium chloride flush, sodium may contain minor errors which are inherent in voice recognition technology. **      Final report electronically signed by Dr Audra Pereira on 11/15/2023 12:35 PM      XR FEMUR RIGHT (MIN 2 VIEWS)   Final Result   1. No acute bony abnormality            **This report has been created using voice recognition software. It may contain minor errors which are inherent in voice recognition technology. **      Final report electronically signed by Dr Audra Pereira on 11/15/2023 12:39 PM      XR FEMUR LEFT (MIN 2 VIEWS)   Final Result   1. No acute bony abnormality            **This report has been created using voice recognition software. It may contain minor errors which are inherent in voice recognition technology. **      Final report electronically signed by Dr Audra Pereira on 11/15/2023 12:35 PM      XR HIP W PELVIS MIN 5 VWS BILATERAL   Final Result   1. No acute bony abnormality            **This report has been created using voice recognition software. It may contain minor errors which are inherent in voice recognition technology. **      Final report electronically signed by Dr Audra Pereira on 11/15/2023 12:42 PM          Diet: ADULT DIET;  Regular    DVT prophylaxis: [] Lovenox                                 [] SCDs                                 [] SQ Heparin                                 [] Encourage ambulation           [] Already on Anticoagulation     Disposition:    [] Home       [] TCU       [] Rehab       [] Psych       [x] SNF       [] [address]       [] Other-    Code Status: Full Code    PT/OT Eval:        Electronically signed by Sis Parry PA-C on 11/17/2023 at 9:42 AM

## 2023-11-17 NOTE — DISCHARGE INSTR - COC
Continuity of Care Form    Patient Name: Tina Acuna   :  1930  MRN:  [id number]    Admit date:  11/15/2023  Discharge date:  2023    Code Status Order: Full Code   Advance Directives:     Admitting Physician:  No admitting provider for patient encounter. PCP: DHEERAJ Grier CNP    Discharging Nurse: [facility] Unit/Room#: 6K-18/018-A  Discharging Unit Phone Number: [phone]    Emergency Contact:   Extended Emergency Contact Information  Primary Emergency Contact: Salina Ambrose  Address: [facility] [phone]           [place] [facility] [address] [address] [place] Phone: [phone]  Mobile Phone: [phone]  Relation: Child  Hearing or visual needs: None  Other needs: None  Preferred [facility]: [facility]   needed? No  Secondary Emergency Contact: Coats   Address: [place]  [phone]           [place] [facility] [place] [address] [address] [place] Phone: [phone]  Work Phone: [phone]  Mobile Phone: [phone]  Relation: Child   needed?  No    Past Surgical History:  Past Surgical History:   Procedure Laterality Date    BLADDER SUSPENSION  1968    FOOT SURGERY Right 1970s    HYSTERECTOMY (CERVIX STATUS UNKNOWN)  1968    JOINT REPLACEMENT Left     left knee    JOINT REPLACEMENT Right     hip    MOHS SURGERY  14    NOSE    CO OFFICE/OUTPT VISIT,PROCEDURE ONLY N/A 2018    MOHS DEFECT REPAIR BCC DORSUM OF NOSE performed by Jaxon Yuan MD at [address]      small blockage-adhesion       Immunization History:   Immunization History   Administered Date(s) Administered    COVID-19, PFIZER Bivalent, DO NOT Dilute, (age 12y+), IM, 30 mcg/0.3 mL 11/10/2022    COVID-19, PFIZER GRAY top, DO NOT Dilute, (age 15 y+), IM, 30 mcg/0.3 mL 2022    COVID-19, PFIZER PURPLE top, DILUTE for use, (age 15 y+), 30mcg/0.3mL 2021, 2021, 2021    Hep B, ENGERIX-B, RECOMBIVAX-HB, (age Birth -

## 2023-11-17 NOTE — CARE COORDINATION
Late documentation: Changed to IP status. Pt verbalized understanding and gave permission for possible discharge within 4 hours of receiving IMM.

## 2023-11-17 NOTE — PROGRESS NOTES
Physician Progress Note      PATIENT:               Ken Rodriguez  CSN #:                  [id number]  :                       1930  ADMIT DATE:       11/15/2023 9:39 AM  DISCH DATE:  Paul Peña  PROVIDER #:        Sapphire Soto PA-C          QUERY TEXT:    Pt admitted post falls . Pt noted to have rhabdomyolysis. If possible,   please document in progress notes and discharge summary if you are evaluating   and/or treating any of the following: The medical record reflects the following:  Risk Factors: falls  Clinical Indicators: presented post falls with mild RUPALI and rhabdomyolysis;   falls were unwitnessed; total CK elevated at 692 with trace blood in urine and   color yellow  Treatment: Labs, imaging, monitoring, IVF    Per [email or web address]  Traumatic rhabdomyolysis cause examples: crush syndrome, prolonged   immobilization  Nontraumatic rhabdomyolysis cause examples:  marked exertion, hyperthermia,   metabolic myopathy, drugs or toxins, infections, electrolyte disorders. Options provided:  -- Traumatic rhabdomyolysis  -- Nontraumatic rhabdomyolysis  -- Other - I will add my own diagnosis  -- Disagree - Not applicable / Not valid  -- Disagree - Clinically unable to determine / Unknown  -- Refer to Clinical Documentation Reviewer    PROVIDER RESPONSE TEXT:    This patient has traumatic rhabdomyolysis.     Query created by: Khoa Aldrich on 2023 10:46 AM      Electronically signed by:  Sapphire Soto PA-C 2023 10:54 AM

## 2023-11-19 LAB
EKG ATRIAL RATE: 73 BPM
EKG P AXIS: 79 DEGREES
EKG P-R INTERVAL: 192 MS
EKG Q-T INTERVAL: 440 MS
EKG QRS DURATION: 74 MS
EKG QTC CALCULATION (BAZETT): 484 MS
EKG R AXIS: 17 DEGREES
EKG T AXIS: 88 DEGREES
EKG VENTRICULAR RATE: 73 BPM
MYOGLOBIN UR-MCNC: < 1 MG/L (ref 0–1)

## 2023-11-20 ENCOUNTER — CARE COORDINATION (OUTPATIENT)
Dept: CASE MANAGEMENT | Age: 88
End: 2023-11-20
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
## 2023-11-20 PROCEDURE — 1111F DSCHRG MED/CURRENT MED MERGE: CPT | Performed by: NURSE PRACTITIONER

## 2023-11-20 NOTE — CARE COORDINATION
Care Transitions Initial Follow Up Call    Call within 2 business days of discharge: Yes    Patient Current Location:  Home: [facility]    Care Transition Nurse contacted the  [facility], [address]  by telephone to perform post hospital discharge assessment. Verified name and  with  AL nurse  as identifiers. Provided introduction to self, and explanation of the Care Transition Nurse role. Patient: Jacky Mcqueen Patient : 1930   MRN: <[id number]>  Reason for Admission: COVID, Fall, Knee pain, UTI  Discharge Date: 23 RARS: Readmission Risk Score: 16.3      Last Discharge Facility       Date Complaint Diagnosis Description Type Department Provider    11/15/23 Positive For Covid-19; Fall; Knee Pain Non-traumatic rhabdomyolysis ED to Hosp-Admission (Discharged) (ADMITTED) [facility] Erinn Caraballo PA-C; Nora Aguilar. .. Was this an external facility discharge? No Discharge Facility: [address] [place] [address] to be reviewed by the provider   Additional needs identified to be addressed with provider: No  none               Method of communication with provider: none. Contacted pt for initial care transition follow up. Pt resides at [address] [place] [address]. Spoke with pt's nurse Francis. She states that Ceasar Dial is doing great since returning to the facility. No further falls since returning. She continues to use her WC to get around. Francis confirmed she has orders for PT. She denies Cami having any s/s of COVID. Also she is not aware of any c/o s/s of UTI. Pt currently on Keflex. Denies having any new or worsening symptoms. Reviewed medication list.  She confirmed she has started taking the Keflex. Francis also reports that pt is taking a Probiotic BID. Pt has a follow up appt with PCP on 23. Nurse reports that either her daughter or son will take Ceasar Dial to the appt. Francis does not have any questions, concerns or needs at this time. No further outreach.   Facility provides

## 2023-11-28 ENCOUNTER — OFFICE VISIT (OUTPATIENT)
Dept: FAMILY MEDICINE CLINIC | Age: 88
End: 2023-11-28

## 2023-11-28 VITALS
TEMPERATURE: 97.7 F | DIASTOLIC BLOOD PRESSURE: 54 MMHG | OXYGEN SATURATION: 98 % | BODY MASS INDEX: 29.21 KG/M2 | SYSTOLIC BLOOD PRESSURE: 116 MMHG | HEART RATE: 72 BPM | RESPIRATION RATE: 14 BRPM | HEIGHT: 64 IN

## 2023-11-28 DIAGNOSIS — U07.1 COVID: ICD-10-CM

## 2023-11-28 DIAGNOSIS — Z23 NEED FOR VACCINATION: Primary | ICD-10-CM

## 2023-11-28 DIAGNOSIS — Z09 HOSPITAL DISCHARGE FOLLOW-UP: ICD-10-CM

## 2023-12-17 PROBLEM — N39.0 UTI (URINARY TRACT INFECTION): Status: RESOLVED | Noted: 2023-11-17 | Resolved: 2023-12-17

## 2024-01-05 DIAGNOSIS — M25.561 ANTERIOR KNEE PAIN, RIGHT: Primary | ICD-10-CM

## 2024-01-05 RX ORDER — TRAMADOL HYDROCHLORIDE 50 MG/1
25 TABLET ORAL 2 TIMES DAILY PRN
Qty: 30 TABLET | Refills: 2 | Status: SHIPPED | OUTPATIENT
Start: 2024-01-05 | End: 2024-02-02

## 2024-02-17 ENCOUNTER — APPOINTMENT (OUTPATIENT)
Dept: GENERAL RADIOLOGY | Age: 89
End: 2024-02-17
Payer: MEDICARE

## 2024-02-17 ENCOUNTER — HOSPITAL ENCOUNTER (EMERGENCY)
Age: 89
Discharge: HOME | End: 2024-02-17
Attending: STUDENT IN AN ORGANIZED HEALTH CARE EDUCATION/TRAINING PROGRAM
Payer: MEDICARE

## 2024-02-17 ENCOUNTER — APPOINTMENT (OUTPATIENT)
Dept: CT IMAGING | Age: 89
End: 2024-02-17
Payer: MEDICARE

## 2024-02-17 VITALS
DIASTOLIC BLOOD PRESSURE: 57 MMHG | OXYGEN SATURATION: 96 % | TEMPERATURE: 97.7 F | BODY MASS INDEX: 29.02 KG/M2 | HEIGHT: 64 IN | HEART RATE: 54 BPM | WEIGHT: 170 LBS | RESPIRATION RATE: 18 BRPM | SYSTOLIC BLOOD PRESSURE: 181 MMHG
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
## 2024-02-17 LAB
ALBUMIN SERPL BCG-MCNC: 3.9 G/DL (ref 3.5–5.1)
ALP SERPL-CCNC: 135 U/L (ref 38–126)
ALT SERPL W/O P-5'-P-CCNC: 23 U/L (ref 11–66)
ANION GAP SERPL CALC-SCNC: 14 MEQ/L (ref 8–16)
AST SERPL-CCNC: 25 U/L (ref 5–40)
BACTERIA URNS QL MICRO: ABNORMAL /HPF
BASOPHILS ABSOLUTE: 0.1 THOU/MM3 (ref 0–0.1)
BASOPHILS NFR BLD AUTO: 1.6 %
BILIRUB SERPL-MCNC: 0.3 MG/DL (ref 0.3–1.2)
BILIRUB UR QL STRIP.AUTO: NEGATIVE
BUN SERPL-MCNC: 16 MG/DL (ref 7–22)
CALCIUM SERPL-MCNC: 9.3 MG/DL (ref 8.5–10.5)
CASTS #/AREA URNS LPF: ABNORMAL /LPF
CASTS 2: ABNORMAL /LPF
CHARACTER UR: CLEAR
CHLORIDE SERPL-SCNC: 103 MEQ/L (ref 98–111)
CO2 SERPL-SCNC: 23 MEQ/L (ref 23–33)
COLOR: YELLOW
CREAT SERPL-MCNC: 0.6 MG/DL (ref 0.4–1.2)
CRYSTALS URNS MICRO: ABNORMAL
DEPRECATED RDW RBC AUTO: 52.2 FL (ref 35–45)
EKG ATRIAL RATE: 55 BPM
EKG P AXIS: 89 DEGREES
EKG P-R INTERVAL: 228 MS
EKG Q-T INTERVAL: 452 MS
EKG QRS DURATION: 78 MS
EKG QTC CALCULATION (BAZETT): 432 MS
EKG T AXIS: 87 DEGREES
EKG VENTRICULAR RATE: 55 BPM
EOSINOPHIL NFR BLD AUTO: 3.8 %
EOSINOPHILS ABSOLUTE: 0.2 THOU/MM3 (ref 0–0.4)
EPITHELIAL CELLS, UA: ABNORMAL /HPF
ERYTHROCYTE [DISTWIDTH] IN BLOOD BY AUTOMATED COUNT: 15.2 % (ref 11.5–14.5)
FLUAV RNA RESP QL NAA+PROBE: NOT DETECTED
FLUBV RNA RESP QL NAA+PROBE: NOT DETECTED
GFR SERPL CREATININE-BSD FRML MDRD: > 60 ML/MIN/1.73M2
GLUCOSE SERPL-MCNC: 94 MG/DL (ref 70–108)
GLUCOSE UR QL STRIP.AUTO: NEGATIVE MG/DL
HCT VFR BLD AUTO: 38.5 % (ref 37–47)
HGB BLD-MCNC: 12.6 GM/DL (ref 12–16)
HGB UR QL STRIP.AUTO: NEGATIVE
IMM GRANULOCYTES # BLD AUTO: 0.01 THOU/MM3 (ref 0–0.07)
IMM GRANULOCYTES NFR BLD AUTO: 0.2 %
KETONES UR QL STRIP.AUTO: NEGATIVE
LYMPHOCYTES ABSOLUTE: 1.7 THOU/MM3 (ref 1–4.8)
LYMPHOCYTES NFR BLD AUTO: 36.7 %
MAGNESIUM SERPL-MCNC: 2.1 MG/DL (ref 1.6–2.4)
MCH RBC QN AUTO: 30.7 PG (ref 26–33)
MCHC RBC AUTO-ENTMCNC: 32.7 GM/DL (ref 32.2–35.5)
MCV RBC AUTO: 93.7 FL (ref 81–99)
MISCELLANEOUS 2: ABNORMAL
MONOCYTES ABSOLUTE: 0.6 THOU/MM3 (ref 0.4–1.3)
MONOCYTES NFR BLD AUTO: 13.1 %
NEUTROPHILS NFR BLD AUTO: 44.6 %
NITRITE UR QL STRIP: NEGATIVE
NRBC BLD AUTO-RTO: 0 /100 WBC
OSMOLALITY SERPL CALC.SUM OF ELEC: 280.3 MOSMOL/KG (ref 275–300)
PH UR STRIP.AUTO: 7 [PH] (ref 5–9)
PLATELET # BLD AUTO: 202 THOU/MM3 (ref 130–400)
PMV BLD AUTO: 10.3 FL (ref 9.4–12.4)
POTASSIUM SERPL-SCNC: 4.3 MEQ/L (ref 3.5–5.2)
PROT SERPL-MCNC: 7 G/DL (ref 6.1–8)
PROT UR STRIP.AUTO-MCNC: NEGATIVE MG/DL
RBC # BLD AUTO: 4.11 MILL/MM3 (ref 4.2–5.4)
RBC URINE: ABNORMAL /HPF
RENAL EPI CELLS #/AREA URNS HPF: ABNORMAL /[HPF]
SARS-COV-2 RNA RESP QL NAA+PROBE: NOT DETECTED
SEGMENTED NEUTROPHILS ABSOLUTE COUNT: 2 THOU/MM3 (ref 1.8–7.7)
SODIUM SERPL-SCNC: 140 MEQ/L (ref 135–145)
SP GR UR REFRACT.AUTO: 1.01 (ref 1–1.03)
TROPONIN, HIGH SENSITIVITY: 17 NG/L (ref 0–12)
TROPONIN, HIGH SENSITIVITY: 18 NG/L (ref 0–12)
UROBILINOGEN, URINE: 0.2 EU/DL (ref 0–1)
WBC # BLD AUTO: 4.5 THOU/MM3 (ref 4.8–10.8)
WBC #/AREA URNS HPF: ABNORMAL /HPF
WBC #/AREA URNS HPF: ABNORMAL /[HPF]
YEAST LIKE FUNGI URNS QL MICRO: ABNORMAL

## 2024-02-17 PROCEDURE — 81001 URINALYSIS AUTO W/SCOPE: CPT

## 2024-02-17 PROCEDURE — 2580000003 HC RX 258: Performed by: STUDENT IN AN ORGANIZED HEALTH CARE EDUCATION/TRAINING PROGRAM

## 2024-02-17 PROCEDURE — 72125 CT NECK SPINE W/O DYE: CPT

## 2024-02-17 PROCEDURE — 72170 X-RAY EXAM OF PELVIS: CPT

## 2024-02-17 PROCEDURE — 6370000000 HC RX 637 (ALT 250 FOR IP): Performed by: STUDENT IN AN ORGANIZED HEALTH CARE EDUCATION/TRAINING PROGRAM

## 2024-02-17 PROCEDURE — 93005 ELECTROCARDIOGRAM TRACING: CPT | Performed by: STUDENT IN AN ORGANIZED HEALTH CARE EDUCATION/TRAINING PROGRAM

## 2024-02-17 PROCEDURE — 71046 X-RAY EXAM CHEST 2 VIEWS: CPT

## 2024-02-17 PROCEDURE — 83735 ASSAY OF MAGNESIUM: CPT

## 2024-02-17 PROCEDURE — 84484 ASSAY OF TROPONIN QUANT: CPT

## 2024-02-17 PROCEDURE — 36415 COLL VENOUS BLD VENIPUNCTURE: CPT

## 2024-02-17 PROCEDURE — 93010 ELECTROCARDIOGRAM REPORT: CPT | Performed by: INTERNAL MEDICINE

## 2024-02-17 PROCEDURE — 99285 EMERGENCY DEPT VISIT HI MDM: CPT

## 2024-02-17 PROCEDURE — 70450 CT HEAD/BRAIN W/O DYE: CPT

## 2024-02-17 PROCEDURE — 80053 COMPREHEN METABOLIC PANEL: CPT

## 2024-02-17 PROCEDURE — 87086 URINE CULTURE/COLONY COUNT: CPT

## 2024-02-17 PROCEDURE — 87636 SARSCOV2 & INF A&B AMP PRB: CPT

## 2024-02-17 PROCEDURE — 85025 COMPLETE CBC W/AUTO DIFF WBC: CPT

## 2024-02-17 RX ORDER — ACETAMINOPHEN 500 MG
1000 TABLET ORAL ONCE
Status: COMPLETED | OUTPATIENT
Start: 2024-02-17 | End: 2024-02-17

## 2024-02-17 RX ORDER — SODIUM CHLORIDE, SODIUM LACTATE, POTASSIUM CHLORIDE, AND CALCIUM CHLORIDE .6; .31; .03; .02 G/100ML; G/100ML; G/100ML; G/100ML
500 INJECTION, SOLUTION INTRAVENOUS ONCE
Status: COMPLETED | OUTPATIENT
Start: 2024-02-17 | End: 2024-02-17

## 2024-02-17 RX ADMIN — ACETAMINOPHEN 1000 MG: 500 TABLET ORAL at 11:02

## 2024-02-17 RX ADMIN — SODIUM CHLORIDE, POTASSIUM CHLORIDE, SODIUM LACTATE AND CALCIUM CHLORIDE 500 ML: 600; 310; 30; 20 INJECTION, SOLUTION INTRAVENOUS at 09:01

## 2024-02-17 ASSESSMENT — PAIN SCALES - GENERAL
PAINLEVEL_OUTOF10: 7
PAINLEVEL_OUTOF10: 7

## 2024-02-17 ASSESSMENT — PAIN DESCRIPTION - DESCRIPTORS
DESCRIPTORS: ACHING
DESCRIPTORS: SORE

## 2024-02-17 ASSESSMENT — PAIN DESCRIPTION - PAIN TYPE
TYPE: ACUTE PAIN
TYPE: CHRONIC PAIN

## 2024-02-17 ASSESSMENT — PAIN - FUNCTIONAL ASSESSMENT
PAIN_FUNCTIONAL_ASSESSMENT: NONE - DENIES PAIN
PAIN_FUNCTIONAL_ASSESSMENT: NONE - DENIES PAIN
PAIN_FUNCTIONAL_ASSESSMENT: 0-10
PAIN_FUNCTIONAL_ASSESSMENT: NONE - DENIES PAIN
PAIN_FUNCTIONAL_ASSESSMENT: 0-10
PAIN_FUNCTIONAL_ASSESSMENT: NONE - DENIES PAIN

## 2024-02-17 ASSESSMENT — PAIN DESCRIPTION - LOCATION
LOCATION: RIB CAGE
LOCATION: KNEE
LOCATION: KNEE

## 2024-02-17 ASSESSMENT — PAIN DESCRIPTION - ORIENTATION
ORIENTATION: RIGHT

## 2024-02-17 ASSESSMENT — PAIN DESCRIPTION - FREQUENCY: FREQUENCY: CONTINUOUS

## 2024-02-17 NOTE — ED NOTES
Discharge instructions discussed and explained with [facility] staff. Pt departing with [facility] transport in stable condition at this time

## 2024-02-17 NOTE — ED NOTES
Pt resting in bed. Pt updated about plan of care and treatment. Pt denies having pain at this time. Pt has no concerns right now. Vital signs stable and unlabored breathing. Call light within reach. Posey sitter in place.

## 2024-02-17 NOTE — ED NOTES
Pt laying in bed resting. Checked and changed. Pt denies having pain at this moment. Stable vital signs and unlabored breathing. Updated about plan of care. Call light within reach. Posey sign placed.

## 2024-02-17 NOTE — ED NOTES
Pt laying in bed with eyes closed resting. Complaining of 7/10 pain. Pt updated about medication given recently and waiting for the pill to kick in. Pt has no concerns at this time. Vital signs stable and unlabored breathing. Call light within reach. Posey sitter placed.

## 2024-02-17 NOTE — ED NOTES
Pt. Resting in bed with even and unlabored respirations. Pt. States she is having rt rib pain. Pt. Updated about plan of care and treatment. Pt. Has no further concerns, questions or needs at this time. Call light within reach.

## 2024-02-17 NOTE — ED NOTES
Pt arrives to the ED for an unwitnessed fall at [facility]. Staff found her on the floor by her wheelchair. Pt has hx of dementia and is oriented to self only. Not on thinners. Has a skin tear to her right elbow. No trauma noted to pt head. Pt denies any pain at this time. Pt respirations are unlabored. VSS.

## 2024-02-17 NOTE — ED NOTES
Pt resting in bed. Updated about plan of care and treatment. Pt denies having any pain at this time. Vital signs stable and unlabored breathing. Pt has no concerns at this time. Call light within reach. Posey sitter in place.

## 2024-02-17 NOTE — ED PROVIDER NOTES
[facility]  EMERGENCY DEPARTMENT ENCOUNTER      Patient Name: Cami Bai  MRN: [id number]  YOB: 1930  Date of Evaluation: 2/17/2024  Emergency Physician: Matt Shell MD    CHIEF COMPLAINT       Chief Complaint   Patient presents with    Fall       HISTORY OF PRESENT ILLNESS    HPI    History obtained from chart review and unobtainable from patient due to mental status.    Cami Bai is a 89 y.o. female who presents to the emergency department from nursing home brought in by EMS for evaluation of unwitnessed fall.  Comes from nursing home today apparently had an unwitnessed fall.  She usually does transfers from her wheelchair with minimal assistance.  Patient denies any issues, however she does have a history of Alzheimer's disease and is at her mental baseline which is alert and oriented to herself but not to events or surroundings.  She denies any injuries from the fall.  Nursing home reports that she has a skin tear although we are unable to find this.    Pertinent previous and/or external records reviewed:  Prior ED visits reviewed.    REVIEW OF SYSTEMS   Review of Systems  Negative unless documented in HPI    PAST MEDICAL AND SURGICAL HISTORY     Past Medical History:   Diagnosis Date    Arthritis     Cancer (HCC)     skin    Cellulitis     Colitis     Colon polyps     Fatty liver     resolved per patient    Hearing loss of both ears     Hyperlipidemia     Varicose vein of leg     Varicosity        Past Surgical History:   Procedure Laterality Date    BLADDER SUSPENSION  1968    FOOT SURGERY Right 1970s    HYSTERECTOMY (CERVIX STATUS UNKNOWN)  1968    JOINT REPLACEMENT Left     left knee    JOINT REPLACEMENT Right     hip    MOHS SURGERY  1/22/14    NOSE    UT OFFICE/OUTPT VISIT,PROCEDURE ONLY N/A 9/19/2018    MOHS DEFECT REPAIR BCC DORSUM OF NOSE performed by Moses Pitts MD at [facility] [place] [facility]    SMALL INTESTINE SURGERY  2008    small  2/17/2024 9:43 AM      XR CHEST (2 VW)   Final Result   1. Background fibrosis.   2. No acute findings.               **This report has been created using voice recognition software. It may contain minor errors which are inherent in voice recognition technology.**      Final report electronically signed by Dr. Donna Arteaga on 2/17/2024 9:41 AM          LABS: (none if blank)  Labs Reviewed   CBC WITH AUTO DIFFERENTIAL - Abnormal; Notable for the following components:       Result Value    WBC 4.5 (*)     RBC 4.11 (*)     RDW-CV 15.2 (*)     RDW-SD 52.2 (*)     All other components within normal limits   COMPREHENSIVE METABOLIC PANEL - Abnormal; Notable for the following components:    Alkaline Phosphatase 135 (*)     All other components within normal limits   TROPONIN - Abnormal; Notable for the following components:    Troponin, High Sensitivity 17 (*)     All other components within normal limits   URINE WITH REFLEXED MICRO - Abnormal; Notable for the following components:    Leukocyte Esterase, Urine TRACE (*)     All other components within normal limits   TROPONIN - Abnormal; Notable for the following components:    Troponin, High Sensitivity 18 (*)     All other components within normal limits   COVID-19 & INFLUENZA COMBO   CULTURE, REFLEXED, URINE    Narrative:     Source: cath urine       Site:           Current Antibiotics: none   MAGNESIUM   ANION GAP   GLOMERULAR FILTRATION RATE, ESTIMATED   OSMOLALITY     (Any cultures that may have been sent were not resulted at the time of this patient visit. A negative COVID-19 test should be interpreted as COVID no longer suspected unless otherwise noted in this encounter documentation note)    MEDICAL DECISION MAKING   Initial Differential: Includes but is not limited to unwitnessed fall, cardiogenic syncope, electrolyte disturbance, dehydration, urinary tract infection, COVID-19, influenza    Discrepancies:  None    Summary: This is a pleasantly confused

## 2024-02-17 NOTE — ED NOTES
Pt laying in bed. Pt denies being in pain a this time. Vital signs stable and unlabored breathing. Pt updated about pain of care and treatment. Call light within reach.

## 2024-02-17 NOTE — DISCHARGE INSTRUCTIONS
Cami was seen today for unwitnessed fall from her wheelchair.  Workup shows no injury, lab work showed an elevated troponin however repeat it was stable so this is likely her baseline level.    Follow-up with primary care physician.    If symptoms are worse or other new concerns please come back to the Emergency Department for re-evaluation.

## 2024-02-17 NOTE — ED NOTES
Pt brought in by EMS due to a unwitnessed fall at the nursing home. Pt had an unwitnessed fall but staff at the nursing home states that they heard it but they did not see it. Pt has history of dementia and is not on blood thinners. Pt bumped their head and had gotten a skin tear. Pt has stable vital signs and unlabored breathing. Pt is able to answer questions and follow commands. Call light within reach.

## 2024-02-18 LAB
BACTERIA UR CULT: ABNORMAL
ORGANISM: ABNORMAL

## 2024-02-22 LAB
EKG ATRIAL RATE: 55 BPM
EKG P AXIS: 89 DEGREES
EKG P-R INTERVAL: 228 MS
EKG Q-T INTERVAL: 452 MS
EKG QRS DURATION: 78 MS
EKG QTC CALCULATION (BAZETT): 432 MS
EKG R AXIS: 28 DEGREES
EKG T AXIS: 87 DEGREES
EKG VENTRICULAR RATE: 55 BPM

## 2024-03-13 ENCOUNTER — TELEPHONE (OUTPATIENT)
Dept: WOUND CARE | Age: 89
End: 2024-03-13

## 2024-03-13 NOTE — TELEPHONE ENCOUNTER
----- Message from Rossana Hansen sent at 3/13/2024  1:16 PM EDT -----   [phone] TEO(SON) LEFT A VM THAT THE CELLULITIS IS CLEARED UP BUT STILL HAS A WOUND, AND NEEDS ANTIBIOTIC. SHE IS LIVING AT [facility]

## 2024-03-14 ENCOUNTER — TELEPHONE (OUTPATIENT)
Dept: WOUND CARE | Age: 89
End: 2024-03-14

## 2024-03-14 ENCOUNTER — TELEPHONE (OUTPATIENT)
Dept: FAMILY MEDICINE CLINIC | Age: 89
End: 2024-03-14

## 2024-03-14 DIAGNOSIS — L03.119 CELLULITIS OF LOWER EXTREMITY, UNSPECIFIED LATERALITY: Primary | ICD-10-CM

## 2024-03-14 NOTE — TELEPHONE ENCOUNTER
[facility] called son, Ritesh (pt's son), stating Cami has a cellulitis flare up with an open wound in which they are treating. Pt was last seen at the wound clinic on  23, Ritesh was told the referral has  and a new referral is needed in order for Cami to be seen at their office.    Please send a referral to [facility]

## 2024-03-14 NOTE — TELEPHONE ENCOUNTER
Spoke with son that patient's referral has  and once we receive a new referral we will make an appointment. Voiced understanding

## 2024-03-14 NOTE — TELEPHONE ENCOUNTER
----- Message from Rossana Hansen sent at 3/13/2024  1:16 PM EDT -----   [phone] TEO(SON) LEFT A VM THAT THE CELLULITIS IS CLEARED UP BUT STILL HAS A WOUND, AND NEEDS ANTIBIOTIC. SHE IS LIVING AT [facility]

## 2024-03-15 ENCOUNTER — HOSPITAL ENCOUNTER (OUTPATIENT)
Dept: WOUND CARE | Age: 89
Discharge: HOME | End: 2024-03-15
Attending: NURSE PRACTITIONER
Payer: MEDICARE

## 2024-03-15 VITALS
RESPIRATION RATE: 16 BRPM | HEART RATE: 93 BPM | TEMPERATURE: 96.5 F | SYSTOLIC BLOOD PRESSURE: 147 MMHG | WEIGHT: 170 LBS | BODY MASS INDEX: 29.02 KG/M2 | DIASTOLIC BLOOD PRESSURE: 64 MMHG | HEIGHT: 64 IN | OXYGEN SATURATION: 95 %

## 2024-03-15 DIAGNOSIS — S81.801A TRAUMATIC OPEN WOUND OF RIGHT LOWER LEG, INITIAL ENCOUNTER: Primary | ICD-10-CM

## 2024-03-15 DIAGNOSIS — R29.6 FREQUENT FALLS: ICD-10-CM

## 2024-03-15 DIAGNOSIS — R60.0 BILATERAL LOWER EXTREMITY EDEMA: ICD-10-CM

## 2024-03-15 PROCEDURE — 99214 OFFICE O/P EST MOD 30 MIN: CPT

## 2024-03-15 PROCEDURE — 99214 OFFICE O/P EST MOD 30 MIN: CPT | Performed by: NURSE PRACTITIONER

## 2024-03-15 RX ORDER — LIDOCAINE 50 MG/G
OINTMENT TOPICAL ONCE
Status: CANCELLED | OUTPATIENT
Start: 2024-03-15 | End: 2024-03-15

## 2024-03-15 RX ORDER — LIDOCAINE HYDROCHLORIDE 20 MG/ML
JELLY TOPICAL ONCE
Status: CANCELLED | OUTPATIENT
Start: 2024-03-15 | End: 2024-03-15

## 2024-03-15 RX ORDER — LIDOCAINE HYDROCHLORIDE 20 MG/ML
JELLY TOPICAL ONCE
OUTPATIENT
Start: 2024-03-15 | End: 2024-03-15

## 2024-03-15 RX ORDER — GINSENG 100 MG
CAPSULE ORAL ONCE
Status: CANCELLED | OUTPATIENT
Start: 2024-03-15 | End: 2024-03-15

## 2024-03-15 RX ORDER — CLOBETASOL PROPIONATE 0.5 MG/G
OINTMENT TOPICAL ONCE
Status: CANCELLED | OUTPATIENT
Start: 2024-03-15 | End: 2024-03-15

## 2024-03-15 RX ORDER — TRAMADOL HYDROCHLORIDE 50 MG/1
50 TABLET ORAL 2 TIMES DAILY PRN
COMMUNITY

## 2024-03-15 RX ORDER — LIDOCAINE 40 MG/G
CREAM TOPICAL ONCE
Status: CANCELLED | OUTPATIENT
Start: 2024-03-15 | End: 2024-03-15

## 2024-03-15 RX ORDER — BACITRACIN ZINC AND POLYMYXIN B SULFATE 500; 1000 [USP'U]/G; [USP'U]/G
OINTMENT TOPICAL ONCE
Status: CANCELLED | OUTPATIENT
Start: 2024-03-15 | End: 2024-03-15

## 2024-03-15 RX ORDER — GENTAMICIN SULFATE 1 MG/G
OINTMENT TOPICAL ONCE
Status: CANCELLED | OUTPATIENT
Start: 2024-03-15 | End: 2024-03-15

## 2024-03-15 RX ORDER — TRIAMCINOLONE ACETONIDE 1 MG/G
OINTMENT TOPICAL ONCE
OUTPATIENT
Start: 2024-03-15 | End: 2024-03-15

## 2024-03-15 RX ORDER — TRIAMCINOLONE ACETONIDE 1 MG/G
OINTMENT TOPICAL ONCE
Status: CANCELLED | OUTPATIENT
Start: 2024-03-15 | End: 2024-03-15

## 2024-03-15 RX ORDER — BETAMETHASONE DIPROPIONATE 0.5 MG/G
CREAM TOPICAL ONCE
Status: CANCELLED | OUTPATIENT
Start: 2024-03-15 | End: 2024-03-15

## 2024-03-15 RX ORDER — BACITRACIN ZINC AND POLYMYXIN B SULFATE 500; 1000 [USP'U]/G; [USP'U]/G
OINTMENT TOPICAL ONCE
OUTPATIENT
Start: 2024-03-15 | End: 2024-03-15

## 2024-03-15 RX ORDER — CLOBETASOL PROPIONATE 0.5 MG/G
OINTMENT TOPICAL ONCE
OUTPATIENT
Start: 2024-03-15 | End: 2024-03-15

## 2024-03-15 RX ORDER — DOXYCYCLINE HYCLATE 100 MG/1
100 CAPSULE ORAL 2 TIMES DAILY
COMMUNITY

## 2024-03-15 RX ORDER — SODIUM CHLOR/HYPOCHLOROUS ACID 0.033 %
SOLUTION, IRRIGATION IRRIGATION ONCE
Status: CANCELLED | OUTPATIENT
Start: 2024-03-15 | End: 2024-03-15

## 2024-03-15 RX ORDER — GINSENG 100 MG
CAPSULE ORAL ONCE
OUTPATIENT
Start: 2024-03-15 | End: 2024-03-15

## 2024-03-15 RX ORDER — LIDOCAINE HYDROCHLORIDE 40 MG/ML
SOLUTION TOPICAL ONCE
OUTPATIENT
Start: 2024-03-15 | End: 2024-03-15

## 2024-03-15 RX ORDER — LIDOCAINE 40 MG/G
CREAM TOPICAL ONCE
OUTPATIENT
Start: 2024-03-15 | End: 2024-03-15

## 2024-03-15 RX ORDER — SODIUM CHLOR/HYPOCHLOROUS ACID 0.033 %
SOLUTION, IRRIGATION IRRIGATION ONCE
OUTPATIENT
Start: 2024-03-15 | End: 2024-03-15

## 2024-03-15 RX ORDER — LIDOCAINE HYDROCHLORIDE 40 MG/ML
SOLUTION TOPICAL ONCE
Status: CANCELLED | OUTPATIENT
Start: 2024-03-15 | End: 2024-03-15

## 2024-03-15 RX ORDER — IBUPROFEN 200 MG
TABLET ORAL ONCE
OUTPATIENT
Start: 2024-03-15 | End: 2024-03-15

## 2024-03-15 RX ORDER — IBUPROFEN 200 MG
TABLET ORAL ONCE
Status: CANCELLED | OUTPATIENT
Start: 2024-03-15 | End: 2024-03-15

## 2024-03-15 RX ORDER — LIDOCAINE 50 MG/G
OINTMENT TOPICAL ONCE
OUTPATIENT
Start: 2024-03-15 | End: 2024-03-15

## 2024-03-15 RX ORDER — GENTAMICIN SULFATE 1 MG/G
OINTMENT TOPICAL ONCE
OUTPATIENT
Start: 2024-03-15 | End: 2024-03-15

## 2024-03-15 RX ORDER — BETAMETHASONE DIPROPIONATE 0.5 MG/G
CREAM TOPICAL ONCE
OUTPATIENT
Start: 2024-03-15 | End: 2024-03-15

## 2024-03-15 NOTE — PROGRESS NOTES
seconds (sing \"Happy Birthday\" twice).    3) Cleanse wounds with normal saline or wound cleanser and gauze. Pat dry with clean gauze.    4) Right leg wounds- Apply triad paste to wounds. Apply skin prep to the skin around the wound to protect it. Cover with silicone bordered foam dressing. Change 3 times weekly    - Apply tubigrip compression stocking to right leg (make sure it goes from base of toes to bend of knee) daily in the morning and remove at bedtime.    Keep all dressings clean & dry.    Follow up visit:  3 Weeks -  Tuesday April 2nd at 1:30 pm    Supplies: Per [facility]    Keep next scheduled appointment. Please give 24 hour notice if unable to keep appointment. [phone]    If you experience any of the following, please call the Wound Care Service during business hours: Monday through Friday 8:00 am - 4:30 pm  ([phone]).   *Increase in pain   *Temperature over 101   *Increase in drainage from your wound or a foul odor   *Uncontrolled swelling   *Need for compression bandage changes due to slippage, breakthrough drainage    If you need medical attention outside of business hours, please contact your Primary Care Doctor or go to the nearest emergency room.                Electronically signed by DHEERAJ Guzman CNP on 3/15/2024 at 4:17 PM

## 2024-03-15 NOTE — PATIENT INSTRUCTIONS
Visit Discharge/Physician Orders:  - Elevate lower legs periodically throughout the day to decrease swelling.   - Make sure PCP is aware of frequent falls.  - Have PCP evaluate if frequent falls have correlated with taking Tramadol.   - Take doxycycline antibiotic as prescribed.    Home Care: [facility]    Wound Location: Right leg x 2    Dressing orders:     1) Gather wound care supplies and arrange on clean table.     2) Wash your hands with soap and water or use alcohol based hand  for 20 seconds (sing \"Happy Birthday\" twice).    3) Cleanse wounds with normal saline or wound cleanser and gauze. Pat dry with clean gauze.    4) Right leg wounds- Apply triad paste to wounds. Apply skin prep to the skin around the wound to protect it. Cover with silicone bordered foam dressing. Change 3 times weekly    - Apply tubigrip compression stocking to right leg (make sure it goes from base of toes to bend of knee) daily in the morning and remove at bedtime.    Keep all dressings clean & dry.    Follow up visit:  3 Weeks -  Tuesday April 2nd at 1:30 pm    Supplies: Per [facility]    Keep next scheduled appointment. Please give 24 hour notice if unable to keep appointment. [phone]    If you experience any of the following, please call the Wound Care Service during business hours: Monday through Friday 8:00 am - 4:30 pm  ([phone]).   *Increase in pain   *Temperature over 101   *Increase in drainage from your wound or a foul odor   *Uncontrolled swelling   *Need for compression bandage changes due to slippage, breakthrough drainage    If you need medical attention outside of business hours, please contact your Primary Care Doctor or go to the nearest emergency room.

## 2024-04-02 ENCOUNTER — HOSPITAL ENCOUNTER (OUTPATIENT)
Dept: WOUND CARE | Age: 89
Discharge: HOME | End: 2024-04-02
Attending: NURSE PRACTITIONER
Payer: MEDICARE

## 2024-04-02 VITALS
DIASTOLIC BLOOD PRESSURE: 61 MMHG | OXYGEN SATURATION: 93 % | RESPIRATION RATE: 16 BRPM | SYSTOLIC BLOOD PRESSURE: 124 MMHG | TEMPERATURE: 97.3 F | HEART RATE: 81 BPM

## 2024-04-02 DIAGNOSIS — S81.801D TRAUMATIC OPEN WOUND OF RIGHT LOWER LEG, SUBSEQUENT ENCOUNTER: Primary | ICD-10-CM

## 2024-04-02 DIAGNOSIS — S81.801A TRAUMATIC OPEN WOUND OF RIGHT LOWER LEG, INITIAL ENCOUNTER: ICD-10-CM

## 2024-04-02 DIAGNOSIS — R60.0 BILATERAL LOWER EXTREMITY EDEMA: ICD-10-CM

## 2024-04-02 PROCEDURE — 99213 OFFICE O/P EST LOW 20 MIN: CPT

## 2024-04-02 PROCEDURE — 99213 OFFICE O/P EST LOW 20 MIN: CPT | Performed by: NURSE PRACTITIONER

## 2024-04-02 RX ORDER — CLOBETASOL PROPIONATE 0.5 MG/G
OINTMENT TOPICAL ONCE
OUTPATIENT
Start: 2024-04-02 | End: 2024-04-02

## 2024-04-02 RX ORDER — LIDOCAINE HYDROCHLORIDE 20 MG/ML
JELLY TOPICAL ONCE
OUTPATIENT
Start: 2024-04-02 | End: 2024-04-02

## 2024-04-02 RX ORDER — TRIAMCINOLONE ACETONIDE 1 MG/G
OINTMENT TOPICAL ONCE
OUTPATIENT
Start: 2024-04-02 | End: 2024-04-02

## 2024-04-02 RX ORDER — LIDOCAINE HYDROCHLORIDE 40 MG/ML
SOLUTION TOPICAL ONCE
OUTPATIENT
Start: 2024-04-02 | End: 2024-04-02

## 2024-04-02 RX ORDER — LIDOCAINE 40 MG/G
CREAM TOPICAL ONCE
OUTPATIENT
Start: 2024-04-02 | End: 2024-04-02

## 2024-04-02 RX ORDER — SODIUM CHLOR/HYPOCHLOROUS ACID 0.033 %
SOLUTION, IRRIGATION IRRIGATION ONCE
OUTPATIENT
Start: 2024-04-02 | End: 2024-04-02

## 2024-04-02 RX ORDER — BETAMETHASONE DIPROPIONATE 0.5 MG/G
CREAM TOPICAL ONCE
OUTPATIENT
Start: 2024-04-02 | End: 2024-04-02

## 2024-04-02 RX ORDER — BACITRACIN ZINC AND POLYMYXIN B SULFATE 500; 1000 [USP'U]/G; [USP'U]/G
OINTMENT TOPICAL ONCE
OUTPATIENT
Start: 2024-04-02 | End: 2024-04-02

## 2024-04-02 RX ORDER — IBUPROFEN 200 MG
TABLET ORAL ONCE
OUTPATIENT
Start: 2024-04-02 | End: 2024-04-02

## 2024-04-02 RX ORDER — GINSENG 100 MG
CAPSULE ORAL ONCE
OUTPATIENT
Start: 2024-04-02 | End: 2024-04-02

## 2024-04-02 RX ORDER — LIDOCAINE 50 MG/G
OINTMENT TOPICAL ONCE
OUTPATIENT
Start: 2024-04-02 | End: 2024-04-02

## 2024-04-02 RX ORDER — GENTAMICIN SULFATE 1 MG/G
OINTMENT TOPICAL ONCE
OUTPATIENT
Start: 2024-04-02 | End: 2024-04-02

## 2024-04-02 NOTE — PATIENT INSTRUCTIONS
Visit Discharge/Physician Orders:  - Elevate lower legs periodically throughout the day to decrease swelling.     Home Care: [facility]    Wound Location: Right leg x 2    Dressing orders:     1) Gather wound care supplies and arrange on clean table.     2) Wash your hands with soap and water or use alcohol based hand  for 20 seconds (sing \"Happy Birthday\" twice).    3) Cleanse wounds with normal saline or wound cleanser and gauze. Pat dry with clean gauze.    4) Right leg wounds- Apply triad paste to wounds. Apply skin prep to the skin around the wound to protect it. Cover with silicone bordered foam dressing. Change 3 times weekly    - Apply tubigrip compression stocking to right leg (make sure it goes from base of toes to bend of knee) daily in the morning and remove at bedtime.    Keep all dressings clean & dry.    Follow up visit:  3 Weeks -  Wednesday April 24th at 1:00 pm     Supplies: Per [facility]    Keep next scheduled appointment. Please give 24 hour notice if unable to keep appointment. [phone]    If you experience any of the following, please call the Wound Care Service during business hours: Monday through Friday 8:00 am - 4:30 pm  ([phone]).   *Increase in pain   *Temperature over 101   *Increase in drainage from your wound or a foul odor   *Uncontrolled swelling   *Need for compression bandage changes due to slippage, breakthrough drainage    If you need medical attention outside of business hours, please contact your Primary Care Doctor or go to the nearest emergency room.

## 2024-04-02 NOTE — PROGRESS NOTES
1 each 0    acetaminophen (TYLENOL) 500 MG tablet Take 2 tablets by mouth 3 times daily as needed for Pain 180 tablet 1    METAMUCIL FIBER PO Take 2 capsules by mouth daily      Misc. Devices (COMMODE BEDSIDE) MISC Use as directed 1 each 0    Misc. Devices (WHEELCHAIR) MISC Use as directed 1 each 0    Handicap Placard MISC by Does not apply route Expires 4/28/27 1 each 0    Elastic Bandages & Supports (T.E.D. ANTI-EMBOLISM STOCKINGS) MISC Knee high SAMMY hose.  Please measure pt for appropriate size.  Wear during daytime, take off at night. 1 each 1    Handicap Placard MISC by Does not apply route Expires 10/30/25 1 each 0    Misc. Devices (WALKER) MISC Wheeled walker with seat, use as directed 1 each 0    Multiple Vitamins-Minerals (THERAPEUTIC MULTIVITAMIN-MINERALS) tablet Take 1 tablet by mouth daily 30 tablet 11    mirabegron (MYRBETRIQ) 50 MG TB24 Take 50 mg by mouth daily 30 tablet 3    donepezil (ARICEPT) 5 MG tablet take 1 tablet by mouth ONCE NIGHTLY. 90 tablet 3     No current facility-administered medications on file prior to encounter.       REVIEW OF SYSTEMS:     A comprehensive review of systems was negative except for: Pertinent items are noted in HPI.    PHYSICAL EXAM:     /61   Pulse 81   Temp 97.3 °F (36.3 °C) (Infrared)   Resp 16   SpO2 93%   Wt Readings from Last 3 Encounters:   03/15/24 77.1 kg (170 lb)   02/17/24 77.1 kg (170 lb)   11/17/23 77.2 kg (170 lb 3.1 oz)       General:  Awake, alert, no apparent distress.  Appears stated age  HEENT: conjuctivae are clear without exudate or hemorrhage, anicteric sclera, moist oral mucosa.  Chest:  Respirations regular, non-labored.  Chest rise and fall equal bilaterally.  Neurological: Awake, alert, oriented x4  Psychiatric:  Appropriate mood and affect  Extremities: non-traumatic in appearance. 2+ edema pitting edema to right lower leg.  Pedal and posterior pulses +1.  Hemosiderin staining and hyperkeratosis noted.   Skin:  Warm and

## 2024-04-02 NOTE — PLAN OF CARE
Problem: Wound:  Goal: Will show signs of wound healing; wound closure and no evidence of infection  Description: Will show signs of wound healing; wound closure and no evidence of infection  Outcome: Progressing     Patient presents to wound clinic for leg wounds. No s/s of infection noted. See AVS for discharge instructions. Follow up visit:  3 Weeks -  Wednesday April 24th at 1:00 pm     Care plan reviewed with patient and son.  Patient and son verbalize understanding of the plan of care and contribute to goal setting.

## 2024-04-22 ENCOUNTER — OFFICE VISIT (OUTPATIENT)
Dept: FAMILY MEDICINE CLINIC | Age: 89
End: 2024-04-22

## 2024-04-22 VITALS
OXYGEN SATURATION: 94 % | HEIGHT: 64 IN | TEMPERATURE: 97.7 F | SYSTOLIC BLOOD PRESSURE: 118 MMHG | BODY MASS INDEX: 29.18 KG/M2 | RESPIRATION RATE: 14 BRPM | HEART RATE: 73 BPM | DIASTOLIC BLOOD PRESSURE: 58 MMHG

## 2024-04-22 DIAGNOSIS — Z00.00 MEDICARE ANNUAL WELLNESS VISIT, SUBSEQUENT: Primary | ICD-10-CM

## 2024-04-22 DIAGNOSIS — F03.90 DEMENTIA WITHOUT BEHAVIORAL DISTURBANCE (HCC): ICD-10-CM

## 2024-04-22 DIAGNOSIS — R29.6 FALLS FREQUENTLY: ICD-10-CM

## 2024-04-22 ASSESSMENT — PATIENT HEALTH QUESTIONNAIRE - PHQ9
SUM OF ALL RESPONSES TO PHQ QUESTIONS 1-9: 0
1. LITTLE INTEREST OR PLEASURE IN DOING THINGS: NOT AT ALL
SUM OF ALL RESPONSES TO PHQ QUESTIONS 1-9: 0
SUM OF ALL RESPONSES TO PHQ QUESTIONS 1-9: 0
SUM OF ALL RESPONSES TO PHQ9 QUESTIONS 1 & 2: 0
SUM OF ALL RESPONSES TO PHQ QUESTIONS 1-9: 0
2. FEELING DOWN, DEPRESSED OR HOPELESS: NOT AT ALL

## 2024-04-22 ASSESSMENT — LIFESTYLE VARIABLES
HOW MANY STANDARD DRINKS CONTAINING ALCOHOL DO YOU HAVE ON A TYPICAL DAY: PATIENT DOES NOT DRINK
HOW OFTEN DO YOU HAVE A DRINK CONTAINING ALCOHOL: NEVER

## 2024-04-22 NOTE — PROGRESS NOTES
Medicare Annual Wellness Visit    Cami Bai is here for Medicare AWV    Assessment & Plan   Medicare annual wellness visit, subsequent  Falls frequently  -     Urinalysis with Reflex to Culture; Standing  Dementia without behavioral disturbance (HCC)  Recommendations for Preventive Services Due: see orders and patient instructions/AVS.  Recommended screening schedule for the next 5-10 years is provided to the patient in written form: see Patient Instructions/AVS.     Return in about 2 months (around 6/22/2024) for video visit.     Subjective   The following acute and/or chronic problems were also addressed today:  Here today with daughter    Recent falls at assisted living  Was seen in ER in February with no acute findings, urine with no bacteria    Right leg ulcerations so facility NP saw patient via video and started on Doxycycline. Seeing wound clinic now    No further falls that daughter or I are aware of since February  May need higher level of care??? SNF? Memory unit?  Daughter will discuss with staff and family        Patient's complete Health Risk Assessment and screening values have been reviewed and are found in Flowsheets. The following problems were reviewed today and where indicated follow up appointments were made and/or referrals ordered.    Positive Risk Factor Screenings with Interventions:    Fall Risk:  Do you feel unsteady or are you worried about falling? : (!) yes  2 or more falls in past year?: (!) yes  Fall with injury in past year?: (!) yes     Interventions:    Reviewed medications, home hazards, visual acuity, and co-morbidities that can increase risk for falls  Recommended Assisted Device         Controlled Medication Review:      Today's Pain Level: No data recorded   Opioid Risk: (Low risk score <55) Opioid risk score: 9    Patient is low risk for opioid use disorder or overdose.    Last PDMP [place] as Reviewed:  Review User Review Instant Review Result   MAISHA DANIELS 1/5/2024

## 2024-04-24 ENCOUNTER — HOSPITAL ENCOUNTER (OUTPATIENT)
Dept: WOUND CARE | Age: 89
Discharge: HOME | End: 2024-04-24
Attending: NURSE PRACTITIONER
Payer: MEDICARE

## 2024-04-24 VITALS
RESPIRATION RATE: 16 BRPM | HEART RATE: 71 BPM | SYSTOLIC BLOOD PRESSURE: 138 MMHG | TEMPERATURE: 96.6 F | OXYGEN SATURATION: 93 % | DIASTOLIC BLOOD PRESSURE: 63 MMHG

## 2024-04-24 DIAGNOSIS — S81.801D TRAUMATIC OPEN WOUND OF RIGHT LOWER LEG, SUBSEQUENT ENCOUNTER: Primary | ICD-10-CM

## 2024-04-24 DIAGNOSIS — R60.0 BILATERAL LOWER EXTREMITY EDEMA: ICD-10-CM

## 2024-04-24 PROCEDURE — 99213 OFFICE O/P EST LOW 20 MIN: CPT

## 2024-04-24 PROCEDURE — 99212 OFFICE O/P EST SF 10 MIN: CPT | Performed by: NURSE PRACTITIONER

## 2024-04-24 NOTE — PATIENT INSTRUCTIONS
Visit Discharge/Physician Orders:  - Elevate lower legs periodically throughout the day to decrease swelling.     Home Care: [facility]    Wound Location: Right leg - scabbed over    Dressing orders:      Right leg- Apply tubigrip compression stocking to right leg (make sure it goes from base of toes to bend of knee) daily in the morning and remove at bedtime.    Follow up visit: As needed    Supplies: Per [facility]    Keep next scheduled appointment. Please give 24 hour notice if unable to keep appointment. [phone]    If you experience any of the following, please call the Wound Care Service during business hours: Monday through Friday 8:00 am - 4:30 pm  ([phone]).   *Increase in pain   *Temperature over 101   *Increase in drainage from your wound or a foul odor   *Uncontrolled swelling   *Need for compression bandage changes due to slippage, breakthrough drainage    If you need medical attention outside of business hours, please contact your Primary Care Doctor or go to the nearest emergency room.

## 2024-04-24 NOTE — PROGRESS NOTES
[facility]  Consult and Procedure Note      Cami Bai  MEDICAL RECORD NUMBER:  [id number]  AGE: 89 y.o.   GENDER: female  : 1930  EPISODE DATE:  2024  Referring Provider: SILVIANO Tejada CNP  Reason for Referral: right lower leg wound    SUBJECTIVE:     Chief Complaint   Patient presents with    Wound Check     Right leg         HISTORY OF PRESENT ILLNESS      Cami Bai is a 89 y.o. female who presents today for wound/ulcer evaluation. Patient is established. Wound duration: unknown.    Patient presents today for evaluation of right lower extremity wounds.  Patient has history of similar wounds, was last evaluated approximately 1 year ago at which time they were healed. Patient oriented to self only, baseline per family report.  Tribe very limited as son reports he does not know how long wound has been present or any specifics about care.  Patients' son previously reported that patient has been falling every 2-3 days over the last few weeks, stating that staff has been finding her laying on the floor at various times throughout the day.  She is a current resident of [facility].  Patient daughter, present today, states that they are working on creating safer environment for her at home.  Current ordered wound care includes Triad and silicone bordered foams changed three times weekly.  Compression via tubigrip. Patient presents today with wound open to air, unclear when dressings were stopped.  No further needs or concerns reported.      PAST MEDICAL HISTORY             Diagnosis Date    Arthritis     Cancer (HCC)     skin    Cellulitis     Colitis     Colon polyps     Fatty liver     resolved per patient    Hearing loss of both ears     Hyperlipidemia     Varicose vein of leg     Varicosity        PAST SURGICAL HISTORY     Past Surgical History:   Procedure Laterality Date    BLADDER SUSPENSION      FOOT SURGERY Right 1970s    HYSTERECTOMY (CERVIX STATUS UNKNOWN)

## 2024-04-24 NOTE — PLAN OF CARE
Problem: Wound:  Goal: Will show signs of wound healing; wound closure and no evidence of infection  Description: Will show signs of wound healing; wound closure and no evidence of infection  Outcome: Adequate for Discharge   Patient presents to wound clinic for follow up of right leg wounds. Discharge instructions/orders per AVS. Discharge from Wound Clinic, follow up as needed.     Care plan reviewed with patient and daughter.  Patient and daughter verbalize understanding of the plan of care and contribute to goal setting.

## 2024-06-12 ENCOUNTER — TELEPHONE (OUTPATIENT)
Dept: WOUND CARE | Age: 89
End: 2024-06-12

## 2024-06-14 DIAGNOSIS — M25.561 ANTERIOR KNEE PAIN, RIGHT: Primary | ICD-10-CM

## 2024-06-14 DIAGNOSIS — M16.0 OSTEOARTHRITIS OF BOTH HIPS, UNSPECIFIED OSTEOARTHRITIS TYPE: ICD-10-CM

## 2024-06-14 RX ORDER — TRAMADOL HYDROCHLORIDE 50 MG/1
25 TABLET ORAL 2 TIMES DAILY PRN
Qty: 30 TABLET | Refills: 0 | Status: SHIPPED | OUTPATIENT
Start: 2024-07-10 | End: 2024-08-09

## 2024-06-14 NOTE — TELEPHONE ENCOUNTER
Recent Visits  Date Type Provider Dept   04/22/24 Office Visit Carmen Tejada, APRN - CNP [facility]   11/28/23 Office Visit Carmen Tejada, APRN - CNP [facility]   08/10/23 Office Visit Carmen Tejada, APRN - CNP [facility]   01/23/23 Office Visit Carmen Tejada, APRN - CNP [facility]   01/17/23 Office Visit Carmen Tejada, APRN - CNP [facility]   01/12/23 Office Visit Carmen Tejada, APRN - CNP [facility]   01/05/23 Office Visit Jaleesa Anna, APRN - CNP [facility]   01/03/23 Office Visit [place] Holder, DO [facility]   Showing recent visits within past 540 days with a meds authorizing provider and meeting all other requirements  Future Appointments  Date Type Provider Dept   06/24/24 Appointment Carmen Tejada, APRN - CNP [facility]   Showing future appointments within next 150 days with a meds authorizing provider and meeting all other requirements

## 2024-06-14 NOTE — TELEPHONE ENCOUNTER
OAARS reviewed and appropriate.     Controlled Substances Monitoring:     Periodic Controlled Substance Monitoring: No signs of potential drug abuse or diversion identified. (Carmen Tejada, APRN - CNP)

## 2024-06-20 ENCOUNTER — TELEPHONE (OUTPATIENT)
Dept: FAMILY MEDICINE CLINIC | Age: 89
End: 2024-06-20

## 2024-06-20 NOTE — TELEPHONE ENCOUNTER
Looks like Cami had another fall, please call daughter and see if she is aware and if they need anything further from me. This seems to be a reoccurring issue with her.   I know at one point they were considering possible different facility for her or more care.   Electronically signed by DHEERAJ Greer CNP on 6/20/2024 at 9:48 AM

## 2024-08-10 DIAGNOSIS — M25.561 ANTERIOR KNEE PAIN, RIGHT: ICD-10-CM

## 2024-08-12 NOTE — TELEPHONE ENCOUNTER
Recent Visits  Date Type Provider Dept   04/22/24 Office Visit Carmen Tejada APRN - CNP [facility]   11/28/23 Office Visit Carmen Tejada APRN - CNP [facility]   08/10/23 Office Visit Carmen Tejada APRN - CNP [facility]   Showing recent visits within past 540 days with a meds authorizing provider and meeting all other requirements  Future Appointments  No visits were found meeting these conditions.  Showing future appointments within next 150 days with a meds authorizing provider and meeting all other requirements

## 2024-08-13 RX ORDER — TRAMADOL HYDROCHLORIDE 50 MG/1
25 TABLET ORAL 2 TIMES DAILY PRN
Qty: 30 TABLET | Refills: 0 | Status: SHIPPED | OUTPATIENT
Start: 2024-08-13 | End: 2024-09-10

## 2024-08-13 NOTE — TELEPHONE ENCOUNTER
OAARS reviewed and appropriate.     Controlled Substances Monitoring:   Controlled Substance Monitoring:    Acute and Chronic Pain Monitoring:   RX Monitoring Periodic Controlled Substance Monitoring   8/13/2024   3:54 PM No signs of potential drug abuse or diversion identified.

## 2024-11-18 DIAGNOSIS — M25.561 ANTERIOR KNEE PAIN, RIGHT: ICD-10-CM

## 2024-11-18 RX ORDER — TRAMADOL HYDROCHLORIDE 50 MG/1
25 TABLET ORAL 2 TIMES DAILY PRN
Qty: 30 TABLET | Refills: 0 | Status: SHIPPED | OUTPATIENT
Start: 2024-11-18 | End: 2024-12-16

## 2025-01-17 ENCOUNTER — TELEPHONE (OUTPATIENT)
Dept: FAMILY MEDICINE CLINIC | Age: 89
End: 2025-01-17

## 2025-01-17 NOTE — TELEPHONE ENCOUNTER
Pt son called and stated that pt is currently on Mirabegron 50 MG tablets a day. This was ordered by Dr Mcgowan but she no longer see him. He stated that through [facility] it was costing over $400. They recommended switching to Oxybutynin Chloride. It would be much cheaper. He is asking if you are able to prescribe this medication now?

## 2025-01-17 NOTE — TELEPHONE ENCOUNTER
Son is not sure if she is incontinent or not. He will call [facility] and find out. He stated she knows when she has to have a bowel movement but not sure about urinary.

## 2025-01-17 NOTE — TELEPHONE ENCOUNTER
I dont recommend oxybutynin due to her age and hx of falls.  Is she aware of when she has to go the bathroom or incontinent? We use the Myrbetriq for urinary urgency, and I'm just curious if its something that she even needs to take.

## 2025-01-30 DIAGNOSIS — M25.561 ANTERIOR KNEE PAIN, RIGHT: ICD-10-CM

## 2025-01-31 RX ORDER — TRAMADOL HYDROCHLORIDE 50 MG/1
25 TABLET ORAL 2 TIMES DAILY PRN
Qty: 30 TABLET | Refills: 0 | Status: SHIPPED | OUTPATIENT
Start: 2025-01-31 | End: 2025-02-28

## 2025-04-01 ENCOUNTER — TELEPHONE (OUTPATIENT)
Dept: FAMILY MEDICINE CLINIC | Age: 89
End: 2025-04-01

## 2025-04-01 RX ORDER — SOLIFENACIN SUCCINATE 5 MG/1
5 TABLET, FILM COATED ORAL DAILY
Qty: 90 TABLET | Refills: 3 | Status: SHIPPED | OUTPATIENT
Start: 2025-04-01 | End: 2026-04-01

## 2025-04-01 NOTE — TELEPHONE ENCOUNTER
Recent Visits  Date Type Provider Dept   04/22/24 Office Visit Carmen Tejada, APRN - CNP [facility]   11/28/23 Office Visit Carmen Tejada APRN - CNP [facility]   Showing recent visits within past 540 days with a meds authorizing provider and meeting all other requirements  Future Appointments  No visits were found meeting these conditions.  Showing future appointments within next 150 days with a meds authorizing provider and meeting all other requirements     Was sent to the wrong pharmacy in January.

## 2025-04-18 DIAGNOSIS — M25.561 ANTERIOR KNEE PAIN, RIGHT: ICD-10-CM

## 2025-04-18 RX ORDER — TRAMADOL HYDROCHLORIDE 50 MG/1
25 TABLET ORAL 2 TIMES DAILY PRN
Qty: 28 TABLET | Refills: 0 | Status: SHIPPED | OUTPATIENT
Start: 2025-04-18 | End: 2025-05-16

## 2025-04-18 NOTE — TELEPHONE ENCOUNTER
Recent Visits  Date Type Provider Dept   04/22/24 Office Visit Carmen Tejada APRN - CNP [facility]   11/28/23 Office Visit Carmen Tejada APRN - CNP [facility]   Showing recent visits within past 540 days with a meds authorizing provider and meeting all other requirements  Future Appointments  Date Type Provider Dept   04/29/25 Appointment Carmen Tejada APRN - CNP [facility]   Showing future appointments within next 150 days with a meds authorizing provider and meeting all other requirements

## 2025-04-29 ENCOUNTER — OFFICE VISIT (OUTPATIENT)
Dept: FAMILY MEDICINE CLINIC | Age: 89
End: 2025-04-29
Payer: MEDICARE

## 2025-04-29 VITALS
OXYGEN SATURATION: 97 % | SYSTOLIC BLOOD PRESSURE: 118 MMHG | BODY MASS INDEX: 29.18 KG/M2 | TEMPERATURE: 97 F | HEIGHT: 64 IN | RESPIRATION RATE: 14 BRPM | DIASTOLIC BLOOD PRESSURE: 52 MMHG | HEART RATE: 68 BPM

## 2025-04-29 DIAGNOSIS — R41.3 MEMORY LOSS: ICD-10-CM

## 2025-04-29 DIAGNOSIS — F03.90 DEMENTIA WITHOUT BEHAVIORAL DISTURBANCE (HCC): ICD-10-CM

## 2025-04-29 DIAGNOSIS — R26.89 IMBALANCE: ICD-10-CM

## 2025-04-29 DIAGNOSIS — Z00.00 MEDICARE ANNUAL WELLNESS VISIT, SUBSEQUENT: Primary | ICD-10-CM

## 2025-04-29 DIAGNOSIS — R29.6 FALLS FREQUENTLY: ICD-10-CM

## 2025-04-29 DIAGNOSIS — R53.81 PHYSICAL DECONDITIONING: ICD-10-CM

## 2025-04-29 PROCEDURE — G0439 PPPS, SUBSEQ VISIT: HCPCS | Performed by: NURSE PRACTITIONER

## 2025-04-29 PROCEDURE — 1159F MED LIST DOCD IN RCRD: CPT | Performed by: NURSE PRACTITIONER

## 2025-04-29 PROCEDURE — 1123F ACP DISCUSS/DSCN MKR DOCD: CPT | Performed by: NURSE PRACTITIONER

## 2025-04-29 RX ORDER — DONEPEZIL HYDROCHLORIDE 10 MG/1
TABLET, FILM COATED ORAL
Qty: 90 TABLET | Refills: 3
Start: 2025-04-29

## 2025-04-29 ASSESSMENT — PATIENT HEALTH QUESTIONNAIRE - PHQ9
SUM OF ALL RESPONSES TO PHQ QUESTIONS 1-9: 0
SUM OF ALL RESPONSES TO PHQ QUESTIONS 1-9: 0
1. LITTLE INTEREST OR PLEASURE IN DOING THINGS: NOT AT ALL
2. FEELING DOWN, DEPRESSED OR HOPELESS: NOT AT ALL

## 2025-04-29 NOTE — PROGRESS NOTES
Medicare Annual Wellness Visit    Cami Bai is here for Medicare AWV and Other (Pts daughter would like to discuss swelling in her legs. )    Assessment & Plan   Medicare annual wellness visit, subsequent  Dementia without behavioral disturbance (HCC)  -     donepezil (ARICEPT) 10 MG tablet; take 1 tablet by mouth ONCE NIGHTLY., Disp-90 tablet, R-3Adjust Sig  -     CBC with Auto Differential; Future  -     Comprehensive Metabolic Panel; Future  -     External Referral To Physical Therapy  Memory loss  -     donepezil (ARICEPT) 10 MG tablet; take 1 tablet by mouth ONCE NIGHTLY., Disp-90 tablet, R-3Adjust Sig  Falls frequently  -     External Referral To Physical Therapy  Physical deconditioning  -     External Referral To Physical Therapy  Imbalance  -     External Referral To Physical Therapy       No follow-ups on file.     Subjective     Here today with daughter from assisted living    Dementia  Worsening  Last 4 months not sure if she knows who daughter is per daughter  On Aricept  No behavioral problems but gets upset when family leaving and she can't go with them     Falls  Was using walker, now just using wheelchair  Multiple falls or being found by chair  No injury  Pt denies pain    Leg swelling  Likely from venous insufficiency  Wearing adri hose but daughter not sure how often she wears them or that pt removes them    Leg pain  Uses tramadol prn for pain    Patient's complete Health Risk Assessment and screening values have been reviewed and are found in Flowsheets. The following problems were reviewed today and where indicated follow up appointments were made and/or referrals ordered.    Positive Risk Factor Screenings with Interventions:    Fall Risk:  Do you feel unsteady or are you worried about falling? : no  2 or more falls in past year?: (!) yes  Fall with injury in past year?: no     Interventions:    Reviewed medications, home hazards, visual acuity, and co-morbidities that can increase risk

## 2025-05-06 LAB
BASOPHILS ABSOLUTE: 0.1 /ΜL
BASOPHILS RELATIVE PERCENT: 1 %
EOSINOPHILS ABSOLUTE: 0.2 /ΜL
EOSINOPHILS RELATIVE PERCENT: 2.2 %
HCT VFR BLD CALC: 39.6 % (ref 36–46)
HEMOGLOBIN: 13.1 G/DL (ref 12–16)
LYMPHOCYTES ABSOLUTE: 2.2 /ΜL
LYMPHOCYTES RELATIVE PERCENT: 25.6 %
MCH RBC QN AUTO: 30.5 PG
MCHC RBC AUTO-ENTMCNC: 32.9 G/DL
MCV RBC AUTO: 92.6 FL
MONOCYTES ABSOLUTE: 1.1 /ΜL
MONOCYTES RELATIVE PERCENT: 13 %
NEUTROPHILS ABSOLUTE: 5 /ΜL
NEUTROPHILS RELATIVE PERCENT: 58.2 %
PDW BLD-RTO: 16 %
PLATELET # BLD: 259 K/ΜL
PMV BLD AUTO: 9 FL
RBC # BLD: 4.28 10^6/ΜL
WBC # BLD: 6.6 10^3/ML

## 2025-05-07 ENCOUNTER — RESULTS FOLLOW-UP (OUTPATIENT)
Dept: FAMILY MEDICINE CLINIC | Age: 89
End: 2025-05-07

## 2025-05-27 ENCOUNTER — RESULTS FOLLOW-UP (OUTPATIENT)
Dept: FAMILY MEDICINE CLINIC | Age: 89
End: 2025-05-27

## 2025-05-27 RX ORDER — CEFDINIR 300 MG/1
300 CAPSULE ORAL 2 TIMES DAILY
Qty: 14 CAPSULE | Refills: 0 | Status: CANCELLED | OUTPATIENT
Start: 2025-05-27 | End: 2025-06-03

## 2025-05-28 ENCOUNTER — TELEPHONE (OUTPATIENT)
Dept: FAMILY MEDICINE CLINIC | Age: 89
End: 2025-05-28

## 2025-05-28 RX ORDER — CEFDINIR 300 MG/1
300 CAPSULE ORAL 2 TIMES DAILY
Qty: 14 CAPSULE | Refills: 0 | Status: SHIPPED | OUTPATIENT
Start: 2025-05-28 | End: 2025-06-04

## 2025-05-28 NOTE — TELEPHONE ENCOUNTER
Carmen Tejada, APRN - CNP  P [facility] Clinical Staff  Urine looks infected  Await culture  Will start cefdinir while we wait for culture  Confirm what pharmacy they want it sent to  thanks

## 2025-05-28 NOTE — TELEPHONE ENCOUNTER
Pt daughter informed of urine results. Pt daughter voiced understanding with no further questions at this time.       She wants us to call [facility] and ask first.     Spoke with the nurse Vicky at [facility] and she stated it needs to do to [facility] please.

## 2025-06-10 LAB
ALBUMIN: 3 G/DL
ALP BLD-CCNC: 103 U/L
ALT SERPL-CCNC: 16 U/L
ANION GAP SERPL CALCULATED.3IONS-SCNC: ABNORMAL MMOL/L
AST SERPL-CCNC: 19 U/L
BILIRUB SERPL-MCNC: 0.3 MG/DL (ref 0.1–1.4)
BUN BLDV-MCNC: 15 MG/DL
CALCIUM SERPL-MCNC: 8.6 MG/DL
CHLORIDE BLD-SCNC: 105 MMOL/L
CO2: 28 MMOL/L
CREAT SERPL-MCNC: 0.8 MG/DL
GFR, ESTIMATED: 67
GLUCOSE BLD-MCNC: 77 MG/DL
POTASSIUM SERPL-SCNC: 4.4 MMOL/L
SODIUM BLD-SCNC: 138 MMOL/L
TOTAL PROTEIN: 5.5 G/DL (ref 6.4–8.2)

## 2025-06-25 DIAGNOSIS — M25.561 ANTERIOR KNEE PAIN, RIGHT: Primary | ICD-10-CM

## 2025-06-25 RX ORDER — TRAMADOL HYDROCHLORIDE 50 MG/1
25 TABLET ORAL EVERY 12 HOURS PRN
Qty: 28 TABLET | Refills: 0 | Status: SHIPPED | OUTPATIENT
Start: 2025-06-25 | End: 2025-07-23

## 2025-06-25 NOTE — TELEPHONE ENCOUNTER
I reviewed an OARRS report on patient today.  There were no abnormal findings on the report.  Rx sent  Electronically signed by DHEERAJ Staples CNP on 6/25/25 at 12:38 PM EDT

## 2025-06-25 NOTE — TELEPHONE ENCOUNTER
Recent Visits  Date Type Provider Dept   04/29/25 Office Visit Carmen Tejada APRN - CNP [facility]   04/22/24 Office Visit Carmen Tejada APRN - CNP [facility]   Showing recent visits within past 540 days with a meds authorizing provider and meeting all other requirements  Future Appointments  No visits were found meeting these conditions.  Showing future appointments within next 150 days with a meds authorizing provider and meeting all other requirements

## 2025-06-26 ENCOUNTER — APPOINTMENT (OUTPATIENT)
Dept: GENERAL RADIOLOGY | Age: 89
End: 2025-06-26
Payer: MEDICARE

## 2025-06-26 ENCOUNTER — HOSPITAL ENCOUNTER (EMERGENCY)
Age: 89
Discharge: HOME | End: 2025-06-26
Attending: EMERGENCY MEDICINE
Payer: MEDICARE

## 2025-06-26 ENCOUNTER — APPOINTMENT (OUTPATIENT)
Dept: CT IMAGING | Age: 89
End: 2025-06-26
Payer: MEDICARE

## 2025-06-26 VITALS
RESPIRATION RATE: 18 BRPM | OXYGEN SATURATION: 97 % | TEMPERATURE: 97.8 F | HEART RATE: 71 BPM | DIASTOLIC BLOOD PRESSURE: 69 MMHG | SYSTOLIC BLOOD PRESSURE: 185 MMHG
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
## 2025-06-26 LAB
ALBUMIN SERPL BCG-MCNC: 3.6 G/DL (ref 3.4–4.9)
ALP SERPL-CCNC: 185 U/L (ref 38–126)
ALT SERPL W/O P-5'-P-CCNC: 34 U/L (ref 10–35)
ANION GAP SERPL CALC-SCNC: 12 MEQ/L (ref 8–16)
AST SERPL-CCNC: 43 U/L (ref 10–35)
BASOPHILS ABSOLUTE: 0.1 THOU/MM3 (ref 0–0.1)
BASOPHILS NFR BLD AUTO: 1.2 %
BILIRUB CONJ SERPL-MCNC: 0.2 MG/DL (ref 0–0.2)
BILIRUB SERPL-MCNC: 0.2 MG/DL (ref 0.3–1.2)
BUN SERPL-MCNC: 18 MG/DL (ref 8–23)
CALCIUM SERPL-MCNC: 9.1 MG/DL (ref 8.2–9.6)
CHLORIDE SERPL-SCNC: 102 MEQ/L (ref 98–111)
CK SERPL-CCNC: 105 U/L (ref 26–192)
CO2 SERPL-SCNC: 23 MEQ/L (ref 22–29)
CREAT SERPL-MCNC: 0.7 MG/DL (ref 0.5–0.9)
DEPRECATED RDW RBC AUTO: 53.1 FL (ref 35–45)
EKG ATRIAL RATE: 69 BPM
EKG P AXIS: 74 DEGREES
EKG P-R INTERVAL: 200 MS
EKG Q-T INTERVAL: 444 MS
EKG QRS DURATION: 66 MS
EKG QTC CALCULATION (BAZETT): 475 MS
EKG R AXIS: 41 DEGREES
EKG T AXIS: 72 DEGREES
EKG VENTRICULAR RATE: 69 BPM
EOSINOPHIL NFR BLD AUTO: 2.8 %
EOSINOPHILS ABSOLUTE: 0.2 THOU/MM3 (ref 0–0.4)
ERYTHROCYTE [DISTWIDTH] IN BLOOD BY AUTOMATED COUNT: 15.3 % (ref 11.5–14.5)
GFR SERPL CREATININE-BSD FRML MDRD: 80 ML/MIN/1.73M2
GLUCOSE SERPL-MCNC: 89 MG/DL (ref 74–109)
HCT VFR BLD AUTO: 37.5 % (ref 37–47)
HGB BLD-MCNC: 12.1 GM/DL (ref 12–16)
IMM GRANULOCYTES # BLD AUTO: 0.01 THOU/MM3 (ref 0–0.07)
IMM GRANULOCYTES NFR BLD AUTO: 0.2 %
LACTIC ACID, SEPSIS: 2.1 MMOL/L (ref 0.5–1.9)
LIPASE SERPL-CCNC: 26 U/L (ref 13–60)
LYMPHOCYTES ABSOLUTE: 1.3 THOU/MM3 (ref 1–4.8)
LYMPHOCYTES NFR BLD AUTO: 20.3 %
MAGNESIUM SERPL-MCNC: 2.2 MG/DL (ref 1.6–2.6)
MCH RBC QN AUTO: 30.5 PG (ref 26–33)
MCHC RBC AUTO-ENTMCNC: 32.3 GM/DL (ref 32.2–35.5)
MCV RBC AUTO: 94.5 FL (ref 81–99)
MONOCYTES ABSOLUTE: 1.4 THOU/MM3 (ref 0.4–1.3)
MONOCYTES NFR BLD AUTO: 21.1 %
NEUTROPHILS ABSOLUTE: 3.5 THOU/MM3 (ref 1.8–7.7)
NEUTROPHILS NFR BLD AUTO: 54.4 %
NRBC BLD AUTO-RTO: 0 /100 WBC
NT-PROBNP SERPL IA-MCNC: 189 PG/ML (ref 0–449)
OSMOLALITY SERPL CALC.SUM OF ELEC: 275.2 MOSMOL/KG (ref 275–300)
PLATELET # BLD AUTO: 200 THOU/MM3 (ref 130–400)
PMV BLD AUTO: 10.6 FL (ref 9.4–12.4)
POTASSIUM SERPL-SCNC: 4.2 MEQ/L (ref 3.5–5.2)
PROT SERPL-MCNC: 6.8 G/DL (ref 6.4–8.3)
RBC # BLD AUTO: 3.97 MILL/MM3 (ref 4.2–5.4)
SODIUM SERPL-SCNC: 137 MEQ/L (ref 135–145)
TROPONIN, HIGH SENSITIVITY: 14 NG/L (ref 0–12)
URATE SERPL-MCNC: 5.2 MG/DL (ref 2.4–5.7)
WBC # BLD AUTO: 6.5 THOU/MM3 (ref 4.8–10.8)

## 2025-06-26 PROCEDURE — 83735 ASSAY OF MAGNESIUM: CPT

## 2025-06-26 PROCEDURE — 70450 CT HEAD/BRAIN W/O DYE: CPT

## 2025-06-26 PROCEDURE — 73521 X-RAY EXAM HIPS BI 2 VIEWS: CPT

## 2025-06-26 PROCEDURE — 99285 EMERGENCY DEPT VISIT HI MDM: CPT

## 2025-06-26 PROCEDURE — 36415 COLL VENOUS BLD VENIPUNCTURE: CPT

## 2025-06-26 PROCEDURE — 83690 ASSAY OF LIPASE: CPT

## 2025-06-26 PROCEDURE — 80053 COMPREHEN METABOLIC PANEL: CPT

## 2025-06-26 PROCEDURE — 83880 ASSAY OF NATRIURETIC PEPTIDE: CPT

## 2025-06-26 PROCEDURE — 83605 ASSAY OF LACTIC ACID: CPT

## 2025-06-26 PROCEDURE — 72125 CT NECK SPINE W/O DYE: CPT

## 2025-06-26 PROCEDURE — 85025 COMPLETE CBC W/AUTO DIFF WBC: CPT

## 2025-06-26 PROCEDURE — 73564 X-RAY EXAM KNEE 4 OR MORE: CPT

## 2025-06-26 PROCEDURE — 84550 ASSAY OF BLOOD/URIC ACID: CPT

## 2025-06-26 PROCEDURE — 84484 ASSAY OF TROPONIN QUANT: CPT

## 2025-06-26 PROCEDURE — 82248 BILIRUBIN DIRECT: CPT

## 2025-06-26 PROCEDURE — 93010 ELECTROCARDIOGRAM REPORT: CPT | Performed by: INTERNAL MEDICINE

## 2025-06-26 PROCEDURE — 93005 ELECTROCARDIOGRAM TRACING: CPT

## 2025-06-26 PROCEDURE — 82550 ASSAY OF CK (CPK): CPT

## 2025-06-26 RX ORDER — 0.9 % SODIUM CHLORIDE 0.9 %
500 INTRAVENOUS SOLUTION INTRAVENOUS ONCE
Status: DISCONTINUED | OUTPATIENT
Start: 2025-06-26 | End: 2025-06-26

## 2025-06-26 ASSESSMENT — PAIN - FUNCTIONAL ASSESSMENT: PAIN_FUNCTIONAL_ASSESSMENT: NONE - DENIES PAIN

## 2025-06-26 NOTE — DISCHARGE INSTRUCTIONS
You were seen in the ED after a fall.  You are discharged with instructions to follow-up outpatient with your primary care provider.  Take maximum precautions to prevent falls.  Using a wheelchair, walker, and/of physical therapy will improve strength and prevent falls.  Return to the ED for worsening falls, severe headaches, slurred speech, weakness on one side more than the other.

## 2025-06-26 NOTE — ED PROVIDER NOTES
[facility]  EMERGENCY DEPARTMENT ENCOUNTER          Pt Name: Cami Bai  MRN: [id number]  Birthdate 5/25/1930  Date of evaluation: 6/26/2025  Treating Resident Physician: Delta Cuellar MD  Supervising Physician: Black Medina DO    History obtained from the patient and chart.     CHIEF COMPLAINT       Chief Complaint   Patient presents with    Fall       HISTORY OF PRESENT ILLNESS    Cami Bai is a 89 y.o. female with a PMH of dementia, arthritis who presents to the emergency department by squad from [facility] after a reported fall. Mechanism unknown. However, squad reports she was found seated on the bathroom floor. No obvious bleeding on scene. Attempts to reach to child unsuccessful. Patient laying in bed and denies being in pain. She is hard of hearing and there is some confusion noted in her responses. Patient moving all limbs and no obvious bleeding or deformity noted.      Triage notes and Nursing notes were reviewed by myself.  Any discrepancies are addressed above.    PAST MEDICAL HISTORY     Past Medical History:   Diagnosis Date    Arthritis     Cancer (HCC)     skin    Cellulitis     Colitis     Colon polyps     Fatty liver     resolved per patient    Hearing loss of both ears     Hyperlipidemia     Varicose vein of leg     Varicosity        SURGICAL HISTORY       Past Surgical History:   Procedure Laterality Date    BLADDER SUSPENSION  1968    FOOT SURGERY Right 1970s    HYSTERECTOMY (CERVIX STATUS UNKNOWN)  1968    JOINT REPLACEMENT Left     left knee    JOINT REPLACEMENT Right     hip    MOHS SURGERY  1/22/14    NOSE    DE OFFICE/OUTPT VISIT,PROCEDURE ONLY N/A 9/19/2018    MOHS DEFECT REPAIR BCC DORSUM OF NOSE performed by Moses Pitts MD at [facility] [place] [facility]    SMALL INTESTINE SURGERY  2008    small blockage-adhesion       CURRENT MEDICATIONS       Discharge Medication List as of 6/26/2025  5:59 PM        CONTINUE these medications

## 2025-06-26 NOTE — ED TRIAGE NOTES
Pt to ER via EMS from [facility] for concerns of fall. Per EMS pt was found seated on the floor in the bathroom. NKI. Pt denies pain. No known use of blood thinners.

## 2025-08-26 DIAGNOSIS — M25.561 ANTERIOR KNEE PAIN, RIGHT: Primary | ICD-10-CM

## 2025-08-26 RX ORDER — TRAMADOL HYDROCHLORIDE 50 MG/1
25 TABLET ORAL 2 TIMES DAILY PRN
Qty: 28 TABLET | Refills: 3 | Status: SHIPPED | OUTPATIENT
Start: 2025-08-26 | End: 2025-09-23

## (undated) DEVICE — SOLUTION IV 1000ML 0.9% SOD CHL PH 5 INJ USP VIAFLX PLAS

## (undated) DEVICE — GLOVE SURG SZ 8 L11.77IN FNGR THK9.8MIL STRW LTX POLYMER

## (undated) DEVICE — PACK PROCEDURE SURG PLAS SC MIN SRHP LF

## (undated) DEVICE — SPONGE GZ W4XL4IN COT 12 PLY TYP VII WVN C FLD DSGN

## (undated) DEVICE — STERILE COTTON BALLS LARGE 5/P: Brand: MEDLINE

## (undated) DEVICE — GLOVE ORANGE PI 7   MSG9070